# Patient Record
Sex: FEMALE | Race: WHITE | NOT HISPANIC OR LATINO | Employment: OTHER | URBAN - METROPOLITAN AREA
[De-identification: names, ages, dates, MRNs, and addresses within clinical notes are randomized per-mention and may not be internally consistent; named-entity substitution may affect disease eponyms.]

---

## 2017-11-22 ENCOUNTER — OFFICE VISIT (OUTPATIENT)
Dept: INTERNAL MEDICINE | Facility: CLINIC | Age: 72
End: 2017-11-22

## 2017-11-22 VITALS
WEIGHT: 156 LBS | HEART RATE: 79 BPM | HEIGHT: 67 IN | SYSTOLIC BLOOD PRESSURE: 124 MMHG | BODY MASS INDEX: 24.48 KG/M2 | OXYGEN SATURATION: 98 % | DIASTOLIC BLOOD PRESSURE: 82 MMHG | TEMPERATURE: 98.1 F

## 2017-11-22 DIAGNOSIS — M17.0 PRIMARY OSTEOARTHRITIS OF BOTH KNEES: ICD-10-CM

## 2017-11-22 DIAGNOSIS — I10 ESSENTIAL HYPERTENSION: Primary | ICD-10-CM

## 2017-11-22 DIAGNOSIS — E78.49 OTHER HYPERLIPIDEMIA: ICD-10-CM

## 2017-11-22 DIAGNOSIS — I10 ESSENTIAL HYPERTENSION: ICD-10-CM

## 2017-11-22 PROBLEM — M17.10 PRIMARY OSTEOARTHRITIS OF KNEE: Status: ACTIVE | Noted: 2017-11-22

## 2017-11-22 PROCEDURE — 99204 OFFICE O/P NEW MOD 45 MIN: CPT | Performed by: FAMILY MEDICINE

## 2017-11-22 RX ORDER — BENAZEPRIL HYDROCHLORIDE 40 MG/1
40 TABLET, FILM COATED ORAL DAILY
Qty: 30 TABLET | Refills: 3 | Status: SHIPPED | OUTPATIENT
Start: 2017-11-22 | End: 2017-11-29 | Stop reason: ALTCHOICE

## 2017-11-22 RX ORDER — CHLORAL HYDRATE 500 MG
1000 CAPSULE ORAL
COMMUNITY
End: 2019-06-10

## 2017-11-22 RX ORDER — ASPIRIN 81 MG/1
81 TABLET ORAL DAILY
COMMUNITY

## 2017-11-22 RX ORDER — ROSUVASTATIN CALCIUM 5 MG/1
5 TABLET, COATED ORAL DAILY
Qty: 30 TABLET | Refills: 6 | Status: SHIPPED | OUTPATIENT
Start: 2017-11-22 | End: 2018-06-09 | Stop reason: SDUPTHER

## 2017-11-22 RX ORDER — SODIUM PHOSPHATE,MONO-DIBASIC 19G-7G/118
1 ENEMA (ML) RECTAL DAILY
COMMUNITY
End: 2020-01-03

## 2017-11-22 NOTE — PROGRESS NOTES
Subjective   Marcia Ulloa is a 72 y.o. female.     Chief Complaint   Patient presents with   • New Patient Visit     previous PCP: LUIS DANIEL Jefferson   • Hypertension   • Hyperlipidemia         History of Present Illness     Patient resents today for new patient.  Patient has a past medical history of hypertension.  She notes that hypertension is stable.  Her blood pressure today's office visit 124/82.  She currently takes benazepril 1 mg tablets daily.  She notes this helps keeps her blood pressure while balance.  She denies any side effects of headaches or blurry vision.    Has a history of osteoarthritis.  She notes her knees causing her significant pain.  She notes that she recently saw orthopedics, and was given a cortisone shot as well as a gel injection to help her with the pain.  She is also taking glucosamine daily for this arthritis.  She notes that she has noticed symptoms are still present, however it has really improved since having the injections in the knee.    Patient also has a past medical history of hyperlipidemia.  She notes she has been stable on Crestor 5 mg daily.  She has not had a recent cholesterol check.        The following portions of the patient's history were reviewed and updated as appropriate: allergies, current medications, past family history, past medical history, past social history, past surgical history and problem list.    Review of Systems   Constitutional: Negative for chills and fever.   HENT: Negative for congestion, rhinorrhea, sinus pain and sore throat.    Eyes: Negative for photophobia and visual disturbance.   Respiratory: Negative for cough, chest tightness and shortness of breath.    Cardiovascular: Negative for chest pain and palpitations.   Gastrointestinal: Negative for diarrhea, nausea and vomiting.   Genitourinary: Negative for dysuria, frequency and urgency.   Skin: Negative for rash and wound.   Neurological: Negative for dizziness and syncope.    Psychiatric/Behavioral: Negative for behavioral problems and confusion.       Objective   Physical Exam   Constitutional: She is oriented to person, place, and time. She appears well-developed and well-nourished.   HENT:   Head: Normocephalic and atraumatic.   Right Ear: External ear normal.   Left Ear: External ear normal.   Mouth/Throat: Oropharynx is clear and moist.   Eyes: EOM are normal.   Neck: Normal range of motion. Neck supple.   Cardiovascular: Normal rate, regular rhythm and normal heart sounds.    Pulmonary/Chest: Effort normal and breath sounds normal. No respiratory distress.   Musculoskeletal: Normal range of motion.   Lymphadenopathy:     She has no cervical adenopathy.   Neurological: She is alert and oriented to person, place, and time.   Skin: Skin is warm.   Psychiatric: She has a normal mood and affect. Her behavior is normal.   Nursing note and vitals reviewed.      Assessment/Plan   Marcia was seen today for new patient visit, hypertension and hyperlipidemia.    Diagnoses and all orders for this visit:    Essential hypertension  -     benazepril (LOTENSIN) 40 MG tablet; Take 1 tablet by mouth Daily.  -     Comprehensive Metabolic Panel; Future    Other hyperlipidemia  -     rosuvastatin (CRESTOR) 5 MG tablet; Take 1 tablet by mouth Daily.  -     Lipid Panel With LDL / HDL Ratio; Future    Primary osteoarthritis of both knees        -    Patient's pain is under control at this time.  Patient may take Tylenol if pain gets aggravated.  In the meantime patient should follow-up with orthopedics in 6 months.          Return in about 1 year (around 11/22/2018) for Medicare Wellness.    Much of this encounter note is an electronic transcription/translation of spoken language to printed text. The electronic translation of spoken language may permit erroneous, or at times, nonsensical words or phrases to be inadvertently transcribed; although I have reviewed the note for such errors, some may still  exist.

## 2017-11-28 LAB
ALBUMIN SERPL-MCNC: 4.4 G/DL (ref 3.5–5.2)
ALBUMIN/GLOB SERPL: 1.5 G/DL
ALP SERPL-CCNC: 85 U/L (ref 39–117)
ALT SERPL-CCNC: 15 U/L (ref 1–33)
AST SERPL-CCNC: 17 U/L (ref 1–32)
BILIRUB SERPL-MCNC: 0.6 MG/DL (ref 0.1–1.2)
BUN SERPL-MCNC: 21 MG/DL (ref 8–23)
BUN/CREAT SERPL: 16.4 (ref 7–25)
CALCIUM SERPL-MCNC: 9.9 MG/DL (ref 8.6–10.5)
CHLORIDE SERPL-SCNC: 101 MMOL/L (ref 98–107)
CHOLEST SERPL-MCNC: 146 MG/DL (ref 0–200)
CO2 SERPL-SCNC: 25.4 MMOL/L (ref 22–29)
CREAT SERPL-MCNC: 1.28 MG/DL (ref 0.57–1)
GFR SERPLBLD CREATININE-BSD FMLA CKD-EPI: 41 ML/MIN/1.73
GFR SERPLBLD CREATININE-BSD FMLA CKD-EPI: 50 ML/MIN/1.73
GLOBULIN SER CALC-MCNC: 2.9 GM/DL
GLUCOSE SERPL-MCNC: 105 MG/DL (ref 65–99)
HDLC SERPL-MCNC: 54 MG/DL (ref 40–60)
LDLC SERPL CALC-MCNC: 66 MG/DL (ref 0–100)
LDLC/HDLC SERPL: 1.23 {RATIO}
POTASSIUM SERPL-SCNC: 4.7 MMOL/L (ref 3.5–5.2)
PROT SERPL-MCNC: 7.3 G/DL (ref 6–8.5)
SODIUM SERPL-SCNC: 141 MMOL/L (ref 136–145)
TRIGL SERPL-MCNC: 129 MG/DL (ref 0–150)
VLDLC SERPL CALC-MCNC: 25.8 MG/DL (ref 5–40)

## 2017-11-28 NOTE — PROGRESS NOTES
Please inform the patient of the following abnormal results.  Cholesterol is normal.  Stop Benzapril.  Start HCTZ 12.5mg.

## 2017-11-29 ENCOUNTER — TELEPHONE (OUTPATIENT)
Dept: INTERNAL MEDICINE | Facility: CLINIC | Age: 72
End: 2017-11-29

## 2017-11-29 RX ORDER — HYDROCHLOROTHIAZIDE 12.5 MG/1
12.5 CAPSULE, GELATIN COATED ORAL DAILY
Qty: 90 CAPSULE | Refills: 1 | Status: SHIPPED | OUTPATIENT
Start: 2017-11-29 | End: 2018-01-02 | Stop reason: SDUPTHER

## 2017-11-29 RX ORDER — HYDROCHLOROTHIAZIDE 12.5 MG/1
12.5 CAPSULE, GELATIN COATED ORAL DAILY
Qty: 90 CAPSULE | Refills: 1 | Status: SHIPPED | OUTPATIENT
Start: 2017-11-29 | End: 2017-11-29 | Stop reason: SDUPTHER

## 2017-11-29 NOTE — TELEPHONE ENCOUNTER
LVM- patient notified. Patient advised to contact office if they have any questions. Prescription sent to pharmacy per Dr. Mendez.

## 2017-11-29 NOTE — TELEPHONE ENCOUNTER
----- Message from Bk Mendez MD sent at 11/28/2017  9:19 AM EST -----  Please inform the patient of the following abnormal results.  Cholesterol is normal.  Stop Benzapril.  Start HCTZ 12.5mg.

## 2017-11-30 ENCOUNTER — OFFICE VISIT (OUTPATIENT)
Dept: INTERNAL MEDICINE | Facility: CLINIC | Age: 72
End: 2017-11-30

## 2017-11-30 ENCOUNTER — APPOINTMENT (OUTPATIENT)
Dept: LAB | Facility: HOSPITAL | Age: 72
End: 2017-11-30

## 2017-11-30 VITALS
BODY MASS INDEX: 23.98 KG/M2 | OXYGEN SATURATION: 97 % | WEIGHT: 152.8 LBS | DIASTOLIC BLOOD PRESSURE: 78 MMHG | HEIGHT: 67 IN | SYSTOLIC BLOOD PRESSURE: 122 MMHG | HEART RATE: 67 BPM | TEMPERATURE: 98.3 F

## 2017-11-30 DIAGNOSIS — R79.89 ELEVATED SERUM CREATININE: ICD-10-CM

## 2017-11-30 DIAGNOSIS — I10 ESSENTIAL HYPERTENSION: ICD-10-CM

## 2017-11-30 DIAGNOSIS — Z00.00 MEDICARE ANNUAL WELLNESS VISIT, INITIAL: Primary | ICD-10-CM

## 2017-11-30 DIAGNOSIS — M17.0 PRIMARY OSTEOARTHRITIS OF BOTH KNEES: ICD-10-CM

## 2017-11-30 DIAGNOSIS — Z11.59 ENCOUNTER FOR HEPATITIS C SCREENING TEST FOR LOW RISK PATIENT: ICD-10-CM

## 2017-11-30 LAB — HCV AB SER DONR QL: NORMAL

## 2017-11-30 PROCEDURE — G0438 PPPS, INITIAL VISIT: HCPCS | Performed by: FAMILY MEDICINE

## 2017-11-30 PROCEDURE — 86803 HEPATITIS C AB TEST: CPT | Performed by: FAMILY MEDICINE

## 2017-11-30 PROCEDURE — 36415 COLL VENOUS BLD VENIPUNCTURE: CPT | Performed by: FAMILY MEDICINE

## 2017-11-30 PROCEDURE — 99214 OFFICE O/P EST MOD 30 MIN: CPT | Performed by: FAMILY MEDICINE

## 2017-11-30 NOTE — PROGRESS NOTES
QUICK REFERENCE INFORMATION:  The ABCs of the Annual Wellness Visit    Initial Medicare Wellness Visit    HEALTH RISK ASSESSMENT    1945    Recent Hospitalizations:  No hospitalization(s) within the last year..        Current Medical Providers:  Patient Care Team:  Bk Mendez MD as PCP - General (Emergency Medicine)  Senia Frederick MD as Consulting Physician (Obstetrics and Gynecology)  Thaddeus Stafford MD as Consulting Physician (Orthopedic Surgery)  Elayne Paulino MD as Consulting Physician (Gynecology)  Hunter Benito DC (Chiropractic Medicine)  Shahrzad Maza MD as Consulting Physician (Ophthalmology)  Kostas Loomis MD (Dermatology)        Smoking Status:  History   Smoking Status   • Never Smoker   Smokeless Tobacco   • Never Used       Alcohol Consumption:  History   Alcohol Use   • Yes     Comment: 1/day       Depression Screen:   PHQ-2/PHQ-9 Depression Screening 11/30/2017   Little interest or pleasure in doing things 0   Feeling down, depressed, or hopeless 0   Trouble falling or staying asleep, or sleeping too much 3   Feeling tired or having little energy 2   Poor appetite or overeating 0   Feeling bad about yourself - or that you are a failure or have let yourself or your family down 1   Trouble concentrating on things, such as reading the newspaper or watching television 0   Moving or speaking so slowly that other people could have noticed. Or the opposite - being so fidgety or restless that you have been moving around a lot more than usual 0   Thoughts that you would be better off dead, or of hurting yourself in some way 0   Total Score 6   If you checked off any problems, how difficult have these problems made it for you to do your work, take care of things at home, or get along with other people? Not difficult at all       Health Habits and Functional and Cognitive Screening:  Functional & Cognitive Status 11/30/2017   Do you have difficulty preparing food and eating? No   Do  you have difficulty bathing yourself, getting dressed or grooming yourself? No   Do you have difficulty using the toilet? No   Do you have difficulty moving around from place to place? Yes   Do you have trouble with steps or getting out of a bed or a chair? Yes   In the past year have you fallen or experienced a near fall? No   Current Diet Well Balanced Diet   Dental Exam Up to date   Eye Exam Up to date   Exercise (times per week) 7 times per week   Current Exercise Activities Include Housecleaning   Do you need help using the phone?  No   Are you deaf or do you have serious difficulty hearing?  Yes   Do you need help with transportation? No   Do you need help shopping? No   Do you need help preparing meals?  No   Do you need help with housework?  No   Do you need help with laundry? No   Do you need help taking your medications? No   Do you need help managing money? No   Have you felt unusual stress, anger or loneliness in the last month? No   Who do you live with? Spouse   If you need help, do you have trouble finding someone available to you? No   Have you been bothered in the last four weeks by sexual problems? No   Do you have difficulty concentrating, remembering or making decisions? No           Does the patient have evidence of cognitive impairment? No    Asiprin use counseling: Taking ASA appropriately as indicated      Recent Lab Results:    Visual Acuity:  No exam data present    Age-appropriate Screening Schedule:  Refer to the list below for future screening recommendations based on patient's age, sex and/or medical conditions. Orders for these recommended tests are listed in the plan section. The patient has been provided with a written plan.    Health Maintenance   Topic Date Due   • TDAP/TD VACCINES (1 - Tdap) [birth year]/09/1965   • ZOSTER VACCINE  09/04/2017   • PNEUMOCOCCAL VACCINES (65+ LOW/MEDIUM RISK) (2 of 2 - PPSV23) 05/01/2018   • LIPID PANEL  11/27/2018   • MAMMOGRAM  12/01/2018   • COLONOSCOPY   "01/01/2024   • INFLUENZA VACCINE  Completed        Subjective   History of Present Illness    Marcia Ulloa is a 72 y.o. female who presents for an Annual Wellness Visit.    The following portions of the patient's history were reviewed and updated as appropriate: allergies, current medications, past family history, past medical history, past social history, past surgical history and problem list.    Outpatient Medications Prior to Visit   Medication Sig Dispense Refill   • aspirin 81 MG EC tablet Take 81 mg by mouth Daily.     • Calcium Carbonate-Vitamin D (CALCIUM-CARB 600 + D PO) Take 1 tablet by mouth Daily.     • CRANBERRY PO Take 1 tablet by mouth Daily.     • glucosamine-chondroitin 500-400 MG capsule capsule Take 1 capsule by mouth Daily.     • hydrochlorothiazide (MICROZIDE) 12.5 MG capsule Take 1 capsule by mouth Daily. 90 capsule 1   • Multiple Vitamin (MULTI VITAMIN DAILY PO) Take 1 tablet by mouth Daily.     • Omega-3 Fatty Acids (FISH OIL) 1000 MG capsule capsule Take 1,000 mg by mouth Daily With Breakfast.     • rosuvastatin (CRESTOR) 5 MG tablet Take 1 tablet by mouth Daily. 30 tablet 6     No facility-administered medications prior to visit.        Patient Active Problem List   Diagnosis   • Essential hypertension   • Other hyperlipidemia   • Primary osteoarthritis of knee   • Elevated serum creatinine       Advance Care Planning:  has an advance directive - a copy has been provided and is in file    Identification of Risk Factors:  Risk factors include: none.    Review of Systems    Compared to one year ago, the patient feels her physical health is better.  Compared to one year ago, the patient feels her mental health is the same.    Objective     Physical Exam    Vitals:    11/30/17 1142   BP: 122/78   BP Location: Left arm   Patient Position: Sitting   Cuff Size: Adult   Pulse: 67   Temp: 98.3 °F (36.8 °C)   TempSrc: Oral   SpO2: 97%   Weight: 152 lb 12.8 oz (69.3 kg)   Height: 67\" (170.2 cm) "   PainSc: 0-No pain       Body mass index is 23.93 kg/(m^2).  Discussed the patient's BMI with her. The BMI is in the acceptable range.    Assessment/Plan   Patient Self-Management and Personalized Health Advice  The patient has been provided with information about: exercise and preventive services including:   · TdaP vaccine.    Visit Diagnoses:    ICD-10-CM ICD-9-CM   1. Medicare annual wellness visit, initial Z00.00 V70.0   2. Encounter for hepatitis C screening test for low risk patient Z11.59 V73.89   3. Essential hypertension I10 401.9   4. Elevated serum creatinine R79.89 790.99       Orders Placed This Encounter   Procedures   • Hepatitis C Antibody   • Comprehensive Metabolic Panel     Standing Status:   Future       Outpatient Encounter Prescriptions as of 11/30/2017   Medication Sig Dispense Refill   • aspirin 81 MG EC tablet Take 81 mg by mouth Daily.     • Calcium Carbonate-Vitamin D (CALCIUM-CARB 600 + D PO) Take 1 tablet by mouth Daily.     • CRANBERRY PO Take 1 tablet by mouth Daily.     • glucosamine-chondroitin 500-400 MG capsule capsule Take 1 capsule by mouth Daily.     • hydrochlorothiazide (MICROZIDE) 12.5 MG capsule Take 1 capsule by mouth Daily. 90 capsule 1   • Multiple Vitamin (MULTI VITAMIN DAILY PO) Take 1 tablet by mouth Daily.     • Omega-3 Fatty Acids (FISH OIL) 1000 MG capsule capsule Take 1,000 mg by mouth Daily With Breakfast.     • rosuvastatin (CRESTOR) 5 MG tablet Take 1 tablet by mouth Daily. 30 tablet 6     No facility-administered encounter medications on file as of 11/30/2017.        Reviewed use of high risk medication in the elderly: yes  Reviewed for potential of harmful drug interactions in the elderly: yes    Follow Up:  No Follow-up on file.     An After Visit Summary and PPPS with all of these plans were given to the patient.

## 2017-11-30 NOTE — PROGRESS NOTES
Subjective   Marcia Ulloa is a 72 y.o. female.     Chief Complaint   Patient presents with   • Inital Medicare Wellness Visit         History of Present Illness     Patient has a past medical history of hypertension.  On last office visit a CMP was conducted, and serum creatinine was elevated.  I discussed the patient about the findings.  Patient stated that in the past she's also had an elevated serum creatinine and nothing was mentioned to her.  She is currently taken an ACE inhibitor for her blood pressure.  I discussed switching his ACE inhibitor to a mild diuretic such as hydrochlorothiazide.  I discussed that we will need to monitor patient's serum creatinine as well.  Patient also states that she takes nightly ibuprofen to help her with her knee pain.  I discussed patient to take Tylenol instead of ibuprofen for her osteoarthritis of her knees.  Patient notes that she has not tried Tylenol.  She recently got a urine injection and a Synvisc gel ejection into the knees.  She notes that this is helped significantly.    Patient's blood pressure has been stable.  Today's visit her blood pressures 122/78.  She denies any side effects of the medication.  She denies any headaches or blurry vision.    The following portions of the patient's history were reviewed and updated as appropriate: allergies, current medications, past family history, past medical history, past social history, past surgical history and problem list.    Review of Systems   Constitutional: Negative for chills and fever.   HENT: Negative for congestion, rhinorrhea, sinus pain and sore throat.    Eyes: Negative for photophobia and visual disturbance.   Respiratory: Negative for cough, chest tightness and shortness of breath.    Cardiovascular: Negative for chest pain and palpitations.   Gastrointestinal: Negative for diarrhea, nausea and vomiting.   Genitourinary: Negative for dysuria, frequency and urgency.   Skin: Negative for rash and wound.    Neurological: Negative for dizziness and syncope.   Psychiatric/Behavioral: Negative for behavioral problems and confusion.       Objective   Physical Exam   Constitutional: She is oriented to person, place, and time. She appears well-developed and well-nourished.   HENT:   Head: Normocephalic and atraumatic.   Right Ear: External ear normal.   Left Ear: External ear normal.   Mouth/Throat: Oropharynx is clear and moist.   Eyes: EOM are normal.   Neck: Normal range of motion. Neck supple.   Cardiovascular: Normal rate, regular rhythm and normal heart sounds.    Pulmonary/Chest: Effort normal and breath sounds normal. No respiratory distress.   Abdominal: Soft. There is no tenderness. There is no guarding.   Musculoskeletal: Normal range of motion.   Lymphadenopathy:     She has no cervical adenopathy.   Neurological: She is alert and oriented to person, place, and time.   Skin: Skin is warm.   Psychiatric: She has a normal mood and affect. Her behavior is normal.   Nursing note and vitals reviewed.      Assessment/Plan   Marcia was seen today for inital medicare wellness visit.    Diagnoses and all orders for this visit:    Medicare annual wellness visit, initial    Osteoarthritis        -     I discussed with patient that she should stop taking ibuprofen.  She should use Tylenol for pain.  We'll reevaluate in couple weeks.    Essential hypertension  -     Comprehensive Metabolic Panel; Future  -     Discussed with patient to discontinue the ACE inhibitor.  We'll start patient on hydrochlorothiazide 12.5 mg.  Discussed with patient that we will recheck in 3 weeks on how she is doing with the new medication.    Elevated serum creatinine  -     Comprehensive Metabolic Panel; Future  -     Serum creatinine was elevated on the last CMP done.  I discussed the patient that we will check this in couple weeks and reevaluate.  I discussed the patient with stopping the ACE inhibitor as well as NSAIDs with patient as taken  at nighttime, her creatinine should come back down to normal.  If it continues to stay abnormal, patient will will need to see nephrology.          No Follow-up on file.    Much of this encounter note is an electronic transcription/translation of spoken language to printed text. The electronic translation of spoken language may permit erroneous, or at times, nonsensical words or phrases to be inadvertently transcribed; although I have reviewed the note for such errors, some may still exist.

## 2017-12-04 ENCOUNTER — TELEPHONE (OUTPATIENT)
Dept: INTERNAL MEDICINE | Facility: CLINIC | Age: 72
End: 2017-12-04

## 2017-12-04 NOTE — TELEPHONE ENCOUNTER
----- Message from Bk Mendez MD sent at 12/1/2017  3:44 PM EST -----  The labs were reviewed. Please inform patient that labs were normal.

## 2017-12-19 DIAGNOSIS — R79.89 ELEVATED SERUM CREATININE: Primary | ICD-10-CM

## 2017-12-20 LAB
ALBUMIN SERPL-MCNC: 4.2 G/DL (ref 3.5–5.2)
ALBUMIN/GLOB SERPL: 1.4 G/DL
ALP SERPL-CCNC: 89 U/L (ref 39–117)
ALT SERPL-CCNC: 15 U/L (ref 1–33)
AST SERPL-CCNC: 19 U/L (ref 1–32)
BILIRUB SERPL-MCNC: 0.3 MG/DL (ref 0.1–1.2)
BUN SERPL-MCNC: 23 MG/DL (ref 8–23)
BUN/CREAT SERPL: 17.6 (ref 7–25)
CALCIUM SERPL-MCNC: 9.5 MG/DL (ref 8.6–10.5)
CHLORIDE SERPL-SCNC: 101 MMOL/L (ref 98–107)
CO2 SERPL-SCNC: 28.7 MMOL/L (ref 22–29)
CREAT SERPL-MCNC: 1.31 MG/DL (ref 0.57–1)
GLOBULIN SER CALC-MCNC: 3 GM/DL
GLUCOSE SERPL-MCNC: 106 MG/DL (ref 65–99)
POTASSIUM SERPL-SCNC: 3.8 MMOL/L (ref 3.5–5.2)
PROT SERPL-MCNC: 7.2 G/DL (ref 6–8.5)
SODIUM SERPL-SCNC: 143 MMOL/L (ref 136–145)

## 2017-12-21 ENCOUNTER — TELEPHONE (OUTPATIENT)
Dept: INTERNAL MEDICINE | Facility: CLINIC | Age: 72
End: 2017-12-21

## 2017-12-21 DIAGNOSIS — R74.8 ELEVATED CREATINE KINASE: Primary | ICD-10-CM

## 2017-12-21 NOTE — TELEPHONE ENCOUNTER
----- Message from Bk Mendez MD sent at 12/21/2017  9:16 AM EST -----  Please inform the patient of the following abnormal results.  Serum creatinine continues to stay elevated.  Patient will need to see nephrology for further evaluation.

## 2017-12-21 NOTE — PROGRESS NOTES
Please inform the patient of the following abnormal results.  Serum creatinine continues to stay elevated.  Patient will need to see nephrology for further evaluation.

## 2017-12-21 NOTE — TELEPHONE ENCOUNTER
Patient's  notified (okay per HIPAA) and voiced understanding. Patient's  advised to have patient contact office if they have any questions. Referral put into [facility] per Dr. Mendez.

## 2017-12-22 ENCOUNTER — OFFICE VISIT (OUTPATIENT)
Dept: INTERNAL MEDICINE | Facility: CLINIC | Age: 72
End: 2017-12-22

## 2017-12-22 VITALS
OXYGEN SATURATION: 100 % | RESPIRATION RATE: 16 BRPM | HEIGHT: 67 IN | TEMPERATURE: 98.1 F | WEIGHT: 152.7 LBS | SYSTOLIC BLOOD PRESSURE: 134 MMHG | HEART RATE: 76 BPM | DIASTOLIC BLOOD PRESSURE: 76 MMHG | BODY MASS INDEX: 23.97 KG/M2

## 2017-12-22 DIAGNOSIS — I10 ESSENTIAL HYPERTENSION: ICD-10-CM

## 2017-12-22 DIAGNOSIS — R79.89 ELEVATED SERUM CREATININE: Primary | ICD-10-CM

## 2017-12-22 PROCEDURE — 99214 OFFICE O/P EST MOD 30 MIN: CPT | Performed by: FAMILY MEDICINE

## 2017-12-22 NOTE — PROGRESS NOTES
Subjective   Marcia Ulloa is a 72 y.o. female.     Chief Complaint   Patient presents with   • Labs Only     recheck kidney fx         History of Present Illness     It was noted that patient's renal function had declined the last office visit.  Today's office visit patient's most recent serum creatinine done couple days ago shows is 1.31.  Patient states that in the past she's had episodes of elevated serum creatinine.  She is only monitoring and did not see any specialist regarding this.    Last office visit she was told to stop lisinopril started on hydrochlorothiazide.  Perhaps Ace inhibitors could be contributing to her elevated serum creatinine.  Her blood pressure today's office visit has been normal at 134/76.  She is currently taken hydrochlorothiazide 12.5 mg daily.  She currently denies any side effects of the hydrochlorothiazide.    The following portions of the patient's history were reviewed and updated as appropriate: allergies, current medications, past family history, past medical history, past social history, past surgical history and problem list.    Review of Systems   Constitutional: Negative for chills and fever.   HENT: Negative for congestion, rhinorrhea, sinus pain and sore throat.    Eyes: Negative for photophobia and visual disturbance.   Respiratory: Negative for cough, chest tightness and shortness of breath.    Cardiovascular: Negative for chest pain and palpitations.   Gastrointestinal: Negative for diarrhea, nausea and vomiting.   Genitourinary: Negative for dysuria, frequency and urgency.   Skin: Negative for rash and wound.   Neurological: Negative for dizziness and syncope.   Psychiatric/Behavioral: Negative for behavioral problems and confusion.       Objective   Physical Exam   Constitutional: She is oriented to person, place, and time. She appears well-developed and well-nourished.   HENT:   Head: Normocephalic and atraumatic.   Right Ear: External ear normal.   Left Ear:  External ear normal.   Mouth/Throat: Oropharynx is clear and moist.   Eyes: EOM are normal.   Neck: Normal range of motion. Neck supple.   Cardiovascular: Normal rate, regular rhythm and normal heart sounds.    Pulmonary/Chest: Effort normal and breath sounds normal. No respiratory distress.   Musculoskeletal: Normal range of motion.   Lymphadenopathy:     She has no cervical adenopathy.   Neurological: She is alert and oriented to person, place, and time.   Skin: Skin is warm.   Psychiatric: She has a normal mood and affect. Her behavior is normal.   Nursing note and vitals reviewed.      Assessment/Plan   Marcia was seen today for labs only.    Diagnoses and all orders for this visit:    Elevated serum creatinine  - Labs were reviewed at today's office visit.  We'll continue to monitor.  Patient perhaps we'll need to see nephrology.  Patient may need to see nephrology down in [place], and she believes they after Christmas and does not come back to end of April.    Essential hypertension  - Continue hydrochlorothiazide 12.5 mg.  Patient seems to be doing well on this dose.          No Follow-up on file.    Much of this encounter note is an electronic transcription/translation of spoken language to printed text. The electronic translation of spoken language may permit erroneous, or at times, nonsensical words or phrases to be inadvertently transcribed; although I have reviewed the note for such errors, some may still exist.

## 2018-01-02 ENCOUNTER — TELEPHONE (OUTPATIENT)
Dept: INTERNAL MEDICINE | Facility: CLINIC | Age: 73
End: 2018-01-02

## 2018-01-02 RX ORDER — HYDROCHLOROTHIAZIDE 12.5 MG/1
25 CAPSULE, GELATIN COATED ORAL DAILY
Qty: 90 CAPSULE | Refills: 1
Start: 2018-01-02 | End: 2018-01-30 | Stop reason: DRUGHIGH

## 2018-01-02 NOTE — TELEPHONE ENCOUNTER
Patient called stating that she doesn't think that her new blood pressure medication is working. She stated she was changed from benazepril 40 mg once daily to HCTZ 12.5 mg once daily. Patient stated that her BP has been running from 153-148/84-80. Per Dr. Mendez, increase HCTZ from 12.5 mg to 25 mg daily. Patient should monitor BP twice daily and call back at the end of the week with results. Patient notified. Patient stated she had enough HCTZ 12.5 mg tablets to take 2 daily to equal 25 mg daily.

## 2018-01-30 ENCOUNTER — TELEPHONE (OUTPATIENT)
Dept: INTERNAL MEDICINE | Facility: CLINIC | Age: 73
End: 2018-01-30

## 2018-01-30 RX ORDER — HYDROCHLOROTHIAZIDE 25 MG/1
25 TABLET ORAL DAILY
Qty: 90 TABLET | Refills: 1 | Status: SHIPPED | OUTPATIENT
Start: 2018-01-30 | End: 2018-07-23 | Stop reason: SDUPTHER

## 2018-01-30 NOTE — TELEPHONE ENCOUNTER
Patient called to report her blood pressure readings on her new dose of HCTZ 25 mg daily. They are as follows:    129/82  137/84  136/79  132/78  126/77    Per Dr. Mendez send in prescription for HCTZ 25 mg daily.

## 2018-05-07 ENCOUNTER — OFFICE VISIT (OUTPATIENT)
Dept: INTERNAL MEDICINE | Facility: CLINIC | Age: 73
End: 2018-05-07

## 2018-05-07 VITALS
OXYGEN SATURATION: 96 % | HEART RATE: 84 BPM | WEIGHT: 151.7 LBS | SYSTOLIC BLOOD PRESSURE: 118 MMHG | DIASTOLIC BLOOD PRESSURE: 84 MMHG | HEIGHT: 67 IN | BODY MASS INDEX: 23.81 KG/M2

## 2018-05-07 DIAGNOSIS — I10 ESSENTIAL HYPERTENSION: Primary | ICD-10-CM

## 2018-05-07 DIAGNOSIS — E78.49 OTHER HYPERLIPIDEMIA: ICD-10-CM

## 2018-05-07 DIAGNOSIS — R79.89 ELEVATED SERUM CREATININE: ICD-10-CM

## 2018-05-07 PROCEDURE — 99214 OFFICE O/P EST MOD 30 MIN: CPT | Performed by: FAMILY MEDICINE

## 2018-05-07 NOTE — PROGRESS NOTES
Subjective   Marcia Ulloa is a 73 y.o. female.     Chief Complaint   Patient presents with   • Hypertension   • Elevated Creatinine   • Hyperlipidemia         History of Present Illness     Patient presents today with a past medical history of essential hypertension.  Patient's currently taken hydrochlorothiazide 25 mg daily for her blood pressure.  Her blood pressure today's office visit is 118/84.  She denies any headaches or blurry vision.  Patient states that she is tolerating the medication well.    Past medical history of hyperlipidemia.  Patient's currently taken Crestor 5 mg daily.  Denies any side effects of medication.    Patient has a history last and most recent CMP is to have an elevated serum creatinine.  Patient states that she plans on seeing a nephrologist.  Patient had been out of town for last 4-1/2 months as she goes to [place] during the winter months.    The following portions of the patient's history were reviewed and updated as appropriate: allergies, current medications, past family history, past medical history, past social history, past surgical history and problem list.    Review of Systems   Constitutional: Negative for chills and fever.   HENT: Negative for congestion, rhinorrhea, sinus pain and sore throat.    Eyes: Negative for photophobia and visual disturbance.   Respiratory: Negative for cough, chest tightness and shortness of breath.    Cardiovascular: Negative for chest pain and palpitations.   Gastrointestinal: Negative for diarrhea, nausea and vomiting.   Genitourinary: Negative for dysuria, frequency and urgency.   Skin: Negative for rash and wound.   Neurological: Negative for dizziness and syncope.   Psychiatric/Behavioral: Negative for behavioral problems and confusion.       Objective   Physical Exam   Constitutional: She is oriented to person, place, and time. She appears well-developed and well-nourished.   HENT:   Head: Normocephalic and atraumatic.   Right Ear:  External ear normal.   Left Ear: External ear normal.   Mouth/Throat: Oropharynx is clear and moist.   Eyes: EOM are normal.   Neck: Normal range of motion. Neck supple.   Cardiovascular: Normal rate, regular rhythm and normal heart sounds.    Pulmonary/Chest: Effort normal and breath sounds normal. No respiratory distress.   Musculoskeletal: Normal range of motion.   Lymphadenopathy:     She has no cervical adenopathy.   Neurological: She is alert and oriented to person, place, and time.   Skin: Skin is warm.   Psychiatric: She has a normal mood and affect. Her behavior is normal.   Nursing note and vitals reviewed.      Assessment/Plan   Marcia was seen today for hypertension, elevated creatinine and hyperlipidemia.    Diagnoses and all orders for this visit:    Essential hypertension  -     Comprehensive Metabolic Panel  -     She should continue the use of the chlorothiazide 25 mg daily.    Elevated serum creatinine  -     Comprehensive Metabolic Panel will be rechecked at today's office visit.    Other hyperlipidemia  -     Comprehensive Metabolic Panel will be checked to rule out any elevation of liver enzymes.  -     The patient should continue the use of Crestor 5 mg daily.          No Follow-up on file.    Dictated utilizing Dragon Voice Recognition Software

## 2018-05-08 ENCOUNTER — TELEPHONE (OUTPATIENT)
Dept: INTERNAL MEDICINE | Facility: CLINIC | Age: 73
End: 2018-05-08

## 2018-05-08 LAB
ALBUMIN SERPL-MCNC: 4.7 G/DL (ref 3.5–5.2)
ALBUMIN/GLOB SERPL: 1.8 G/DL
ALP SERPL-CCNC: 80 U/L (ref 39–117)
ALT SERPL-CCNC: 18 U/L (ref 1–33)
AST SERPL-CCNC: 21 U/L (ref 1–32)
BILIRUB SERPL-MCNC: 0.6 MG/DL (ref 0.1–1.2)
BUN SERPL-MCNC: 25 MG/DL (ref 8–23)
BUN/CREAT SERPL: 22.1 (ref 7–25)
CALCIUM SERPL-MCNC: 10.3 MG/DL (ref 8.6–10.5)
CHLORIDE SERPL-SCNC: 98 MMOL/L (ref 98–107)
CO2 SERPL-SCNC: 27.4 MMOL/L (ref 22–29)
CREAT SERPL-MCNC: 1.13 MG/DL (ref 0.57–1)
GFR SERPLBLD CREATININE-BSD FMLA CKD-EPI: 47 ML/MIN/1.73
GFR SERPLBLD CREATININE-BSD FMLA CKD-EPI: 57 ML/MIN/1.73
GLOBULIN SER CALC-MCNC: 2.6 GM/DL
GLUCOSE SERPL-MCNC: 128 MG/DL (ref 65–99)
POTASSIUM SERPL-SCNC: 3.6 MMOL/L (ref 3.5–5.2)
PROT SERPL-MCNC: 7.3 G/DL (ref 6–8.5)
SODIUM SERPL-SCNC: 141 MMOL/L (ref 136–145)

## 2018-05-08 NOTE — TELEPHONE ENCOUNTER
----- Message from Bk Mendez MD sent at 5/8/2018  9:50 AM EDT -----  Please inform the patient of the following abnormal results.  Serum creatnine still elevated but improved from four months ago. Keep nephology appointment.

## 2018-05-08 NOTE — PROGRESS NOTES
Please inform the patient of the following abnormal results.  Serum creatnine still elevated but improved from four months ago. Keep nephology appointment.

## 2018-05-24 ENCOUNTER — TELEPHONE (OUTPATIENT)
Dept: INTERNAL MEDICINE | Facility: CLINIC | Age: 73
End: 2018-05-24

## 2018-05-24 DIAGNOSIS — Z12.39 SCREENING FOR BREAST CANCER: Primary | ICD-10-CM

## 2018-05-24 NOTE — TELEPHONE ENCOUNTER
Patient called requesting an order for a mammogram. Per Dr. Andrea hi to order screening mammogram Dx: screening for breast cancer. Order put into EPIC. Patient notified.

## 2018-05-25 ENCOUNTER — TRANSCRIBE ORDERS (OUTPATIENT)
Dept: ADMINISTRATIVE | Facility: HOSPITAL | Age: 73
End: 2018-05-25

## 2018-05-25 DIAGNOSIS — Z12.31 VISIT FOR SCREENING MAMMOGRAM: Primary | ICD-10-CM

## 2018-05-31 ENCOUNTER — HOSPITAL ENCOUNTER (OUTPATIENT)
Dept: MAMMOGRAPHY | Facility: HOSPITAL | Age: 73
Discharge: HOME | End: 2018-05-31
Admitting: FAMILY MEDICINE

## 2018-05-31 DIAGNOSIS — Z12.31 VISIT FOR SCREENING MAMMOGRAM: ICD-10-CM

## 2018-05-31 PROCEDURE — 77063 BREAST TOMOSYNTHESIS BI: CPT

## 2018-05-31 PROCEDURE — 77067 SCR MAMMO BI INCL CAD: CPT

## 2018-06-01 ENCOUNTER — TRANSCRIBE ORDERS (OUTPATIENT)
Dept: ADMINISTRATIVE | Facility: HOSPITAL | Age: 73
End: 2018-06-01

## 2018-06-01 DIAGNOSIS — N18.30 CKD (CHRONIC KIDNEY DISEASE), STAGE III (HCC): Primary | ICD-10-CM

## 2018-06-05 ENCOUNTER — APPOINTMENT (OUTPATIENT)
Dept: ULTRASOUND IMAGING | Facility: HOSPITAL | Age: 73
End: 2018-06-05
Attending: INTERNAL MEDICINE

## 2018-06-08 ENCOUNTER — HOSPITAL ENCOUNTER (OUTPATIENT)
Dept: ULTRASOUND IMAGING | Facility: HOSPITAL | Age: 73
Discharge: HOME | End: 2018-06-08
Attending: INTERNAL MEDICINE | Admitting: INTERNAL MEDICINE

## 2018-06-08 ENCOUNTER — TELEPHONE (OUTPATIENT)
Dept: INTERNAL MEDICINE | Facility: CLINIC | Age: 73
End: 2018-06-08

## 2018-06-08 DIAGNOSIS — N18.30 CKD (CHRONIC KIDNEY DISEASE), STAGE III (HCC): ICD-10-CM

## 2018-06-08 PROCEDURE — 76775 US EXAM ABDO BACK WALL LIM: CPT

## 2018-06-08 NOTE — TELEPHONE ENCOUNTER
----- Message from Bk Mendez MD sent at 6/7/2018 10:17 AM EDT -----  Normal mammogram.  Repeat as indicated

## 2018-06-09 DIAGNOSIS — E78.49 OTHER HYPERLIPIDEMIA: ICD-10-CM

## 2018-06-11 RX ORDER — ROSUVASTATIN CALCIUM 5 MG/1
5 TABLET, COATED ORAL DAILY
Qty: 90 TABLET | Refills: 1 | Status: SHIPPED | OUTPATIENT
Start: 2018-06-11 | End: 2018-12-02 | Stop reason: SDUPTHER

## 2018-07-23 RX ORDER — HYDROCHLOROTHIAZIDE 25 MG/1
25 TABLET ORAL DAILY
Qty: 90 TABLET | Refills: 1 | Status: SHIPPED | OUTPATIENT
Start: 2018-07-23 | End: 2018-12-10 | Stop reason: SDUPTHER

## 2018-12-02 DIAGNOSIS — E78.49 OTHER HYPERLIPIDEMIA: ICD-10-CM

## 2018-12-03 RX ORDER — ROSUVASTATIN CALCIUM 5 MG/1
5 TABLET, COATED ORAL DAILY
Qty: 90 TABLET | Refills: 0 | Status: SHIPPED | OUTPATIENT
Start: 2018-12-03 | End: 2018-12-10 | Stop reason: SDUPTHER

## 2018-12-10 ENCOUNTER — OFFICE VISIT (OUTPATIENT)
Dept: INTERNAL MEDICINE | Facility: CLINIC | Age: 73
End: 2018-12-10

## 2018-12-10 VITALS
SYSTOLIC BLOOD PRESSURE: 138 MMHG | HEART RATE: 68 BPM | HEIGHT: 67 IN | BODY MASS INDEX: 23.54 KG/M2 | DIASTOLIC BLOOD PRESSURE: 82 MMHG | WEIGHT: 150 LBS | OXYGEN SATURATION: 95 % | RESPIRATION RATE: 16 BRPM

## 2018-12-10 DIAGNOSIS — Z13.1 SCREENING FOR DIABETES MELLITUS: ICD-10-CM

## 2018-12-10 DIAGNOSIS — Z13.29 SCREENING FOR THYROID DISORDER: ICD-10-CM

## 2018-12-10 DIAGNOSIS — R73.9 ELEVATED SERUM GLUCOSE: ICD-10-CM

## 2018-12-10 DIAGNOSIS — Z13.220 SCREENING FOR LIPID DISORDERS: ICD-10-CM

## 2018-12-10 DIAGNOSIS — Z00.00 MEDICARE ANNUAL WELLNESS VISIT, SUBSEQUENT: Primary | ICD-10-CM

## 2018-12-10 DIAGNOSIS — I10 ESSENTIAL HYPERTENSION: ICD-10-CM

## 2018-12-10 DIAGNOSIS — E78.49 OTHER HYPERLIPIDEMIA: ICD-10-CM

## 2018-12-10 DIAGNOSIS — Z00.00 WELL WOMAN EXAM (NO GYNECOLOGICAL EXAM): ICD-10-CM

## 2018-12-10 PROCEDURE — 99213 OFFICE O/P EST LOW 20 MIN: CPT | Performed by: FAMILY MEDICINE

## 2018-12-10 PROCEDURE — G0439 PPPS, SUBSEQ VISIT: HCPCS | Performed by: FAMILY MEDICINE

## 2018-12-10 PROCEDURE — 96160 PT-FOCUSED HLTH RISK ASSMT: CPT | Performed by: FAMILY MEDICINE

## 2018-12-10 PROCEDURE — 99397 PER PM REEVAL EST PAT 65+ YR: CPT | Performed by: FAMILY MEDICINE

## 2018-12-10 RX ORDER — CELECOXIB 200 MG/1
CAPSULE ORAL EVERY 12 HOURS SCHEDULED
COMMUNITY
End: 2018-12-10

## 2018-12-10 RX ORDER — HYDROCHLOROTHIAZIDE 25 MG/1
25 TABLET ORAL DAILY
Qty: 90 TABLET | Refills: 1 | Status: SHIPPED | OUTPATIENT
Start: 2018-12-10 | End: 2019-07-25 | Stop reason: SDUPTHER

## 2018-12-10 RX ORDER — INFLUENZA A VIRUS A/MICHIGAN/45/2015 X-275 (H1N1) ANTIGEN (FORMALDEHYDE INACTIVATED), INFLUENZA A VIRUS A/SINGAPORE/INFIMH-16-0019/2016 IVR-186 (H3N2) ANTIGEN (FORMALDEHYDE INACTIVATED), AND INFLUENZA B VIRUS B/MARYLAND/15/2016 BX-69A (A B/COLORADO/6/2017-LIKE VIRUS) ANTIGEN (FORMALDEHYDE INACTIVATED) 60; 60; 60 UG/.5ML; UG/.5ML; UG/.5ML
INJECTION, SUSPENSION INTRAMUSCULAR
Refills: 0 | COMMUNITY
Start: 2018-10-05 | End: 2019-06-10

## 2018-12-10 RX ORDER — LETROZOLE 2.5 MG/1
2.5 TABLET, FILM COATED ORAL DAILY
COMMUNITY
Start: 2015-06-18 | End: 2018-12-10

## 2018-12-10 RX ORDER — ROSUVASTATIN CALCIUM 5 MG/1
5 TABLET, COATED ORAL DAILY
Qty: 90 TABLET | Refills: 1 | Status: SHIPPED | OUTPATIENT
Start: 2018-12-10 | End: 2019-08-17 | Stop reason: SDUPTHER

## 2018-12-10 NOTE — PROGRESS NOTES
QUICK REFERENCE INFORMATION:  The ABCs of the Annual Wellness Visit    Subsequent Medicare Wellness Visit    HEALTH RISK ASSESSMENT    1945    Recent Hospitalizations:  No hospitalization(s) within the last year..        Current Medical Providers:  Patient Care Team:  Bk Mendez MD as PCP - General (Family Medicine)  Senia Frederick MD as Consulting Physician (Obstetrics and Gynecology)  Thaddeus Stafford MD as Consulting Physician (Orthopedic Surgery)  Elayne Paulino MD as Consulting Physician (Gynecology)  Hunter Benito DC (Chiropractic Medicine)  Shahrzad Maza MD as Consulting Physician (Ophthalmology)  Kostas Loomis MD (Dermatology)        Smoking Status:  Social History     Tobacco Use   Smoking Status Never Smoker   Smokeless Tobacco Never Used       Alcohol Consumption:  Social History     Substance and Sexual Activity   Alcohol Use Yes    Comment: 1/day       Depression Screen:   PHQ-2/PHQ-9 Depression Screening 12/10/2018   Little interest or pleasure in doing things 0   Feeling down, depressed, or hopeless 0   Trouble falling or staying asleep, or sleeping too much -   Feeling tired or having little energy -   Poor appetite or overeating -   Feeling bad about yourself - or that you are a failure or have let yourself or your family down -   Trouble concentrating on things, such as reading the newspaper or watching television -   Moving or speaking so slowly that other people could have noticed. Or the opposite - being so fidgety or restless that you have been moving around a lot more than usual -   Thoughts that you would be better off dead, or of hurting yourself in some way -   Total Score 0   If you checked off any problems, how difficult have these problems made it for you to do your work, take care of things at home, or get along with other people? -       Health Habits and Functional and Cognitive Screening:  Functional & Cognitive Status 12/10/2018   Do you have  difficulty preparing food and eating? No   Do you have difficulty bathing yourself, getting dressed or grooming yourself? No   Do you have difficulty using the toilet? No   Do you have difficulty moving around from place to place? No   Do you have trouble with steps or getting out of a bed or a chair? Yes   In the past year have you fallen or experienced a near fall? No   Current Diet Well Balanced Diet   Dental Exam Up to date   Eye Exam Not up to date   Exercise (times per week) 5 times per week   Current Exercise Activities Include Swimming   Do you need help using the phone?  No   Are you deaf or do you have serious difficulty hearing?  No   Do you need help with transportation? No   Do you need help shopping? No   Do you need help preparing meals?  No   Do you need help with housework?  No   Do you need help with laundry? No   Do you need help taking your medications? No   Do you need help managing money? No   Do you ever drive or ride in a car without wearing a seat belt? No   Have you felt unusual stress, anger or loneliness in the last month? No   Who do you live with? Spouse   If you need help, do you have trouble finding someone available to you? No   Have you been bothered in the last four weeks by sexual problems? No   Do you have difficulty concentrating, remembering or making decisions? -           Does the patient have evidence of cognitive impairment? No    Aspirin use counseling: Taking ASA appropriately as indicated      Recent Lab Results:  CMP:  Lab Results   Component Value Date     (H) 05/07/2018    BUN 25 (H) 05/07/2018    CREATININE 1.13 (H) 05/07/2018    EGFRIFNONA 47 (L) 05/07/2018    EGFRIFAFRI 57 (L) 05/07/2018    BCR 22.1 05/07/2018     05/07/2018    K 3.6 05/07/2018    CO2 27.4 05/07/2018    CALCIUM 10.3 05/07/2018    PROTENTOTREF 7.3 05/07/2018    ALBUMIN 4.70 05/07/2018    LABGLOBREF 2.6 05/07/2018    LABIL2 1.8 05/07/2018    BILITOT 0.6 05/07/2018    ALKPHOS 80  05/07/2018    AST 21 05/07/2018    ALT 18 05/07/2018     Lipid Panel:  Lab Results   Component Value Date    TRIG 129 11/27/2017    HDL 54 11/27/2017    VLDL 25.8 11/27/2017    LDLHDL 1.23 11/27/2017     HbA1c:       Visual Acuity:  No exam data present    Age-appropriate Screening Schedule:  Refer to the list below for future screening recommendations based on patient's age, sex and/or medical conditions. Orders for these recommended tests are listed in the plan section. The patient has been provided with a written plan.    Health Maintenance   Topic Date Due   • TDAP/TD VACCINES (1 - Tdap) 04/09/1965   • ZOSTER VACCINE (1 of 2) 04/09/1996   • PNEUMOCOCCAL VACCINES (65+ LOW/MEDIUM RISK) (2 of 2 - PPSV23) 05/01/2018   • INFLUENZA VACCINE  08/01/2018   • LIPID PANEL  11/27/2018   • MAMMOGRAM  05/31/2020   • COLONOSCOPY  08/12/2024        Subjective   History of Present Illness    Marcia Ulloa is a 73 y.o. female who presents for an Subsequent Wellness Visit.    The following portions of the patient's history were reviewed and updated as appropriate: allergies, current medications, past family history, past medical history, past social history, past surgical history and problem list.    Patient notes have essential hypertension.  Patient's currently Hctz 25 mg daily.  Patient blood pressures 138/82.  Patient denies any side effects of medication.    Patient notes have hyperlipidemia.  Patient's currently taking Crestor 5 mg daily.  Patient denies any side effects of medication.  Patient states the medication seems to work well for her.    Outpatient Medications Prior to Visit   Medication Sig Dispense Refill   • aspirin 81 MG EC tablet Take 81 mg by mouth Daily.     • Calcium Carbonate-Vitamin D (CALCIUM-CARB 600 + D PO) Take 1 tablet by mouth Daily.     • CRANBERRY PO Take 1 tablet by mouth Daily.     • glucosamine-chondroitin 500-400 MG capsule capsule Take 1 capsule by mouth Daily.     • Multiple Vitamin (MULTI  VITAMIN DAILY PO) Take 1 tablet by mouth Daily.     • hydrochlorothiazide (HYDRODIURIL) 25 MG tablet TAKE 1 TABLET BY MOUTH DAILY. 90 tablet 1   • letrozole (FEMARA) 2.5 MG tablet Take 2.5 mg by mouth Daily.     • rosuvastatin (CRESTOR) 5 MG tablet TAKE 1 TABLET BY MOUTH DAILY. 90 tablet 0   • FLUZONE HIGH-DOSE 0.5 ML suspension prefilled syringe injection TO BE ADMINISTERED BY PHARMACIST FOR IMMUNIZATION  0   • Omega-3 Fatty Acids (FISH OIL) 1000 MG capsule capsule Take 1,000 mg by mouth Daily With Breakfast.     • celecoxib (CELEBREX) 200 MG capsule Every 12 (Twelve) Hours.       No facility-administered medications prior to visit.        Patient Active Problem List   Diagnosis   • Essential hypertension   • Other hyperlipidemia   • Primary osteoarthritis of knee   • Elevated serum creatinine       Advance Care Planning:  has an advance directive - a copy HAS NOT been provided. Have asked the patient to send this to us to add to record.    Identification of Risk Factors:  Risk factors include: cardiovascular risk.    Review of Systems   Constitutional: Negative for chills and fever.   HENT: Negative for congestion, rhinorrhea, sinus pain and sore throat.    Eyes: Negative for photophobia and visual disturbance.   Respiratory: Negative for cough, chest tightness and shortness of breath.    Cardiovascular: Negative for chest pain and palpitations.   Gastrointestinal: Negative for diarrhea, nausea and vomiting.   Genitourinary: Negative for dysuria, frequency and urgency.   Skin: Negative for rash and wound.   Neurological: Negative for dizziness and syncope.   Psychiatric/Behavioral: Negative for behavioral problems and confusion.       Compared to one year ago, the patient feels her physical health is the same.  Compared to one year ago, the patient feels her mental health is the same.    Objective     Physical Exam   Constitutional: She is oriented to person, place, and time. She appears well-developed and  "well-nourished.   HENT:   Head: Normocephalic and atraumatic.   Right Ear: External ear normal.   Left Ear: External ear normal.   Mouth/Throat: Oropharynx is clear and moist.   Eyes: EOM are normal.   Neck: Normal range of motion. Neck supple.   Cardiovascular: Normal rate, regular rhythm and normal heart sounds.   Pulmonary/Chest: Effort normal and breath sounds normal. No respiratory distress.   Abdominal: Soft. There is no tenderness. There is no guarding.   Musculoskeletal: Normal range of motion.   Lymphadenopathy:     She has no cervical adenopathy.   Neurological: She is alert and oriented to person, place, and time.   Skin: Skin is warm.   Psychiatric: She has a normal mood and affect. Her behavior is normal.   Nursing note and vitals reviewed.      Vitals:    12/10/18 0952   BP: 138/82   BP Location: Left arm   Patient Position: Sitting   Cuff Size: Adult   Pulse: 68   Resp: 16   SpO2: 95%   Weight: 68 kg (150 lb)   Height: 170.2 cm (67\")   PainSc:   3       Patient's Body mass index is 23.49 kg/m². BMI is within normal parameters. No follow-up required.      Assessment/Plan   Patient Self-Management and Personalized Health Advice  The patient has been provided with information about: diet, exercise, weight management and prevention of cardiac or vascular disease and preventive services including:   · Diabetes screening, see lab orders, Exercise counseling provided, Nutrition counseling provided, TdaP vaccine.  · Essential hypertension-patient's currently stable we'll continue patient on hydrocodone thiazide 25 mg daily.  · Hyperlipidemia-patient's currently stable, will continue Crestor 5 mg daily.  We'll check lipid panel today's visit.  · Immunizations-patient is up-to-date with immunizations including Pneumovax 23 as well as flu vaccine.  Patient is unsure about taking a TdaP at this time, as she believes she has taken when she was going from international travel.  Patient also believes a hepatitis A " vaccine also given during this time as well.  · Patient is up-to-date on her mammograms and colonoscopy.    Visit Diagnoses:    ICD-10-CM ICD-9-CM   1. Medicare annual wellness visit, subsequent Z00.00 V70.0   2. Well woman exam (no gynecological exam) Z00.00 V70.0   3. Other hyperlipidemia E78.49 272.4   4. Essential hypertension I10 401.9   5. Screening for diabetes mellitus Z13.1 V77.1   6. Screening for thyroid disorder Z13.29 V77.0   7. Elevated serum glucose R73.9 790.29   8. Screening for lipid disorders Z13.220 V77.91       Orders Placed This Encounter   Procedures   • Lipid Panel With LDL / HDL Ratio   • Comprehensive Metabolic Panel   • Thyroid Panel With TSH   • Hemoglobin A1c   • CBC & Differential     Order Specific Question:   Manual Differential     Answer:   No       Outpatient Encounter Medications as of 12/10/2018   Medication Sig Dispense Refill   • aspirin 81 MG EC tablet Take 81 mg by mouth Daily.     • Calcium Carbonate-Vitamin D (CALCIUM-CARB 600 + D PO) Take 1 tablet by mouth Daily.     • CRANBERRY PO Take 1 tablet by mouth Daily.     • glucosamine-chondroitin 500-400 MG capsule capsule Take 1 capsule by mouth Daily.     • hydrochlorothiazide (HYDRODIURIL) 25 MG tablet Take 1 tablet by mouth Daily. 90 tablet 1   • Multiple Vitamin (MULTI VITAMIN DAILY PO) Take 1 tablet by mouth Daily.     • rosuvastatin (CRESTOR) 5 MG tablet Take 1 tablet by mouth Daily. 90 tablet 1   • [DISCONTINUED] hydrochlorothiazide (HYDRODIURIL) 25 MG tablet TAKE 1 TABLET BY MOUTH DAILY. 90 tablet 1   • [DISCONTINUED] letrozole (FEMARA) 2.5 MG tablet Take 2.5 mg by mouth Daily.     • [DISCONTINUED] rosuvastatin (CRESTOR) 5 MG tablet TAKE 1 TABLET BY MOUTH DAILY. 90 tablet 0   • FLUZONE HIGH-DOSE 0.5 ML suspension prefilled syringe injection TO BE ADMINISTERED BY PHARMACIST FOR IMMUNIZATION  0   • Omega-3 Fatty Acids (FISH OIL) 1000 MG capsule capsule Take 1,000 mg by mouth Daily With Breakfast.     • [DISCONTINUED]  celecoxib (CELEBREX) 200 MG capsule Every 12 (Twelve) Hours.       No facility-administered encounter medications on file as of 12/10/2018.        Reviewed use of high risk medication in the elderly: yes  Reviewed for potential of harmful drug interactions in the elderly: yes    Follow Up:  No Follow-up on file.     An After Visit Summary and PPPS with all of these plans were given to the patient.

## 2018-12-11 LAB
ALBUMIN SERPL-MCNC: 4.9 G/DL (ref 3.5–5.2)
ALBUMIN/GLOB SERPL: 1.6 G/DL
ALP SERPL-CCNC: 87 U/L (ref 39–117)
ALT SERPL-CCNC: 21 U/L (ref 1–33)
AST SERPL-CCNC: 21 U/L (ref 1–32)
BASOPHILS # BLD AUTO: 0.07 10*3/MM3 (ref 0–0.2)
BASOPHILS NFR BLD AUTO: 0.7 % (ref 0–1.5)
BILIRUB SERPL-MCNC: 0.7 MG/DL (ref 0.1–1.2)
BUN SERPL-MCNC: 20 MG/DL (ref 8–23)
BUN/CREAT SERPL: 15.7 (ref 7–25)
CALCIUM SERPL-MCNC: 10.5 MG/DL (ref 8.6–10.5)
CHLORIDE SERPL-SCNC: 98 MMOL/L (ref 98–107)
CHOLEST SERPL-MCNC: 173 MG/DL (ref 0–200)
CO2 SERPL-SCNC: 29.3 MMOL/L (ref 22–29)
CREAT SERPL-MCNC: 1.27 MG/DL (ref 0.57–1)
EOSINOPHIL # BLD AUTO: 0.3 10*3/MM3 (ref 0–0.7)
EOSINOPHIL NFR BLD AUTO: 3.2 % (ref 0.3–6.2)
ERYTHROCYTE [DISTWIDTH] IN BLOOD BY AUTOMATED COUNT: 14.7 % (ref 11.7–13)
FT4I SERPL CALC-MCNC: 1.7 (ref 1.2–4.9)
GLOBULIN SER CALC-MCNC: 3 GM/DL
GLUCOSE SERPL-MCNC: 100 MG/DL (ref 65–99)
HBA1C MFR BLD: 5.6 % (ref 4.8–5.6)
HCT VFR BLD AUTO: 46.6 % (ref 35.6–45.5)
HDLC SERPL-MCNC: 63 MG/DL (ref 40–60)
HGB BLD-MCNC: 14.6 G/DL (ref 11.9–15.5)
IMM GRANULOCYTES # BLD: 0.02 10*3/MM3 (ref 0–0.03)
IMM GRANULOCYTES NFR BLD: 0.2 % (ref 0–0.5)
LDLC SERPL CALC-MCNC: 82 MG/DL (ref 0–100)
LDLC/HDLC SERPL: 1.3 {RATIO}
LYMPHOCYTES # BLD AUTO: 1.56 10*3/MM3 (ref 0.9–4.8)
LYMPHOCYTES NFR BLD AUTO: 16.6 % (ref 19.6–45.3)
MCH RBC QN AUTO: 31.8 PG (ref 26.9–32)
MCHC RBC AUTO-ENTMCNC: 31.3 G/DL (ref 32.4–36.3)
MCV RBC AUTO: 101.5 FL (ref 80.5–98.2)
MONOCYTES # BLD AUTO: 1.04 10*3/MM3 (ref 0.2–1.2)
MONOCYTES NFR BLD AUTO: 11.1 % (ref 5–12)
NEUTROPHILS # BLD AUTO: 6.4 10*3/MM3 (ref 1.9–8.1)
NEUTROPHILS NFR BLD AUTO: 68.2 % (ref 42.7–76)
PLATELET # BLD AUTO: 295 10*3/MM3 (ref 140–500)
POTASSIUM SERPL-SCNC: 3.9 MMOL/L (ref 3.5–5.2)
PROT SERPL-MCNC: 7.9 G/DL (ref 6–8.5)
RBC # BLD AUTO: 4.59 10*6/MM3 (ref 3.9–5.2)
SODIUM SERPL-SCNC: 144 MMOL/L (ref 136–145)
T3RU NFR SERPL: 26 % (ref 24–39)
T4 SERPL-MCNC: 6.5 UG/DL (ref 4.5–12)
TRIGL SERPL-MCNC: 141 MG/DL (ref 0–150)
TSH SERPL DL<=0.005 MIU/L-ACNC: 2.91 UIU/ML (ref 0.45–4.5)
VLDLC SERPL CALC-MCNC: 28.2 MG/DL (ref 5–40)
WBC # BLD AUTO: 9.39 10*3/MM3 (ref 4.5–10.7)

## 2018-12-12 NOTE — PROGRESS NOTES
Please inform the patient of the following abnormal results.  Labs are stable. Will continue to monitor.   Does have macrocytosis without anemia, which we will monitor.

## 2018-12-15 ENCOUNTER — TELEPHONE (OUTPATIENT)
Dept: INTERNAL MEDICINE | Facility: CLINIC | Age: 73
End: 2018-12-15

## 2018-12-15 NOTE — TELEPHONE ENCOUNTER
----- Message from Bk Mendez MD sent at 12/12/2018 11:55 AM EST -----  Please inform the patient of the following abnormal results.  Labs are stable. Will continue to monitor.   Does have macrocytosis without anemia, which we will monitor.

## 2019-01-02 ENCOUNTER — CLINICAL SUPPORT (OUTPATIENT)
Dept: INTERNAL MEDICINE | Facility: CLINIC | Age: 74
End: 2019-01-02

## 2019-01-02 DIAGNOSIS — Z23 NEED FOR TDAP VACCINATION: Primary | ICD-10-CM

## 2019-01-02 PROCEDURE — 90471 IMMUNIZATION ADMIN: CPT | Performed by: FAMILY MEDICINE

## 2019-01-02 PROCEDURE — 90715 TDAP VACCINE 7 YRS/> IM: CPT | Performed by: FAMILY MEDICINE

## 2019-06-10 ENCOUNTER — OFFICE VISIT (OUTPATIENT)
Dept: INTERNAL MEDICINE | Facility: CLINIC | Age: 74
End: 2019-06-10

## 2019-06-10 VITALS
HEIGHT: 67 IN | OXYGEN SATURATION: 99 % | SYSTOLIC BLOOD PRESSURE: 122 MMHG | HEART RATE: 73 BPM | WEIGHT: 152 LBS | BODY MASS INDEX: 23.86 KG/M2 | DIASTOLIC BLOOD PRESSURE: 82 MMHG

## 2019-06-10 DIAGNOSIS — E78.49 OTHER HYPERLIPIDEMIA: ICD-10-CM

## 2019-06-10 DIAGNOSIS — Z12.39 SCREENING FOR BREAST CANCER: ICD-10-CM

## 2019-06-10 DIAGNOSIS — D75.89 MACROCYTOSIS: ICD-10-CM

## 2019-06-10 DIAGNOSIS — R79.89 ELEVATED SERUM CREATININE: ICD-10-CM

## 2019-06-10 DIAGNOSIS — I10 ESSENTIAL HYPERTENSION: Primary | ICD-10-CM

## 2019-06-10 PROBLEM — M16.9 OSTEOARTHRITIS OF HIP: Status: ACTIVE | Noted: 2019-06-10

## 2019-06-10 LAB
BASOPHILS # BLD AUTO: 0.1 10*3/MM3 (ref 0–0.2)
BASOPHILS NFR BLD AUTO: 1.3 % (ref 0–1.5)
EOSINOPHIL # BLD AUTO: 0.28 10*3/MM3 (ref 0–0.4)
EOSINOPHIL NFR BLD AUTO: 3.6 % (ref 0.3–6.2)
ERYTHROCYTE [DISTWIDTH] IN BLOOD BY AUTOMATED COUNT: 14.3 % (ref 12.3–15.4)
HCT VFR BLD AUTO: 42.8 % (ref 34–46.6)
HGB BLD-MCNC: 13.4 G/DL (ref 12–15.9)
IMM GRANULOCYTES # BLD AUTO: 0.03 10*3/MM3 (ref 0–0.05)
IMM GRANULOCYTES NFR BLD AUTO: 0.4 % (ref 0–0.5)
LYMPHOCYTES # BLD AUTO: 1.14 10*3/MM3 (ref 0.7–3.1)
LYMPHOCYTES NFR BLD AUTO: 14.5 % (ref 19.6–45.3)
MCH RBC QN AUTO: 31.7 PG (ref 26.6–33)
MCHC RBC AUTO-ENTMCNC: 31.3 G/DL (ref 31.5–35.7)
MCV RBC AUTO: 101.2 FL (ref 79–97)
MONOCYTES # BLD AUTO: 0.78 10*3/MM3 (ref 0.1–0.9)
MONOCYTES NFR BLD AUTO: 9.9 % (ref 5–12)
NEUTROPHILS # BLD AUTO: 5.51 10*3/MM3 (ref 1.7–7)
NEUTROPHILS NFR BLD AUTO: 70.3 % (ref 42.7–76)
NRBC BLD AUTO-RTO: 0 /100 WBC (ref 0–0.2)
PLATELET # BLD AUTO: 259 10*3/MM3 (ref 140–450)
RBC # BLD AUTO: 4.23 10*6/MM3 (ref 3.77–5.28)
WBC # BLD AUTO: 7.84 10*3/MM3 (ref 3.4–10.8)

## 2019-06-10 PROCEDURE — 99214 OFFICE O/P EST MOD 30 MIN: CPT | Performed by: FAMILY MEDICINE

## 2019-06-10 RX ORDER — LANOLIN ALCOHOL/MO/W.PET/CERES
3 CREAM (GRAM) TOPICAL NIGHTLY PRN
COMMUNITY

## 2019-06-10 NOTE — PROGRESS NOTES
Subjective   Marcia Ulloa is a 74 y.o. female.     Chief Complaint   Patient presents with   • Hyperlipidemia   • Hypertension     patient stated that her blood pressure has been running in the 140's/90's at home         History of Present Illness     Patient has a past medical history for essential hypertension.  Patient blood pressure 122/82.  Patient is currently taking hydrochlorothiazide 25 mg daily.  Patient denies any side effects of the medication.    Patient has a history of elevated serum creatinine levels.  Patient will go see nephrology regarding this.  She states that she is avoiding NSAIDs.    Patient also has hyperlipidemia.  Patient is currently on Crestor 5 mg daily.  Patient denies any side effects of the medication.    The following portions of the patient's history were reviewed and updated as appropriate: allergies, current medications, past family history, past medical history, past social history, past surgical history and problem list.    Review of Systems   Constitutional: Negative for chills and fever.   HENT: Negative for congestion, rhinorrhea, sinus pain and sore throat.    Eyes: Negative for photophobia and visual disturbance.   Respiratory: Negative for cough, chest tightness and shortness of breath.    Cardiovascular: Negative for chest pain and palpitations.   Gastrointestinal: Negative for diarrhea, nausea and vomiting.   Genitourinary: Negative for dysuria, frequency and urgency.   Skin: Negative for rash and wound.   Neurological: Negative for dizziness and syncope.   Psychiatric/Behavioral: Negative for behavioral problems and confusion.       Objective   Physical Exam   Constitutional: She is oriented to person, place, and time. She appears well-developed and well-nourished.   HENT:   Head: Normocephalic and atraumatic.   Right Ear: External ear normal.   Left Ear: External ear normal.   Eyes: EOM are normal.   Neck: Normal range of motion. Neck supple.   Cardiovascular:  Normal rate, regular rhythm and normal heart sounds.   Pulmonary/Chest: Effort normal and breath sounds normal. No respiratory distress.   Musculoskeletal: Normal range of motion.   Lymphadenopathy:     She has no cervical adenopathy.   Neurological: She is alert and oriented to person, place, and time.   Skin: Skin is warm.   Psychiatric: She has a normal mood and affect. Her behavior is normal.   Nursing note and vitals reviewed.      Assessment/Plan   Marcia was seen today for hyperlipidemia and hypertension.    Diagnoses and all orders for this visit:    Essential hypertension  -     Comprehensive Metabolic Panel  -     Continue hctz 25mg daily.     Other hyperlipidemia  -     Comprehensive Metabolic Panel  -     Lipid Panel With LDL / HDL Ratio  -     Continue crestor 5mg daily.     Elevated serum creatinine        -    Continue to follow up with nephrology.     Screening for breast cancer  -     Mammo Screening Bilateral With CAD    Macrocytosis  -     CBC & Differential  -     If continues will need to check folate and b12 levels.           No Follow-up on file.    Dictated utilizing Dragon Voice Recognition Software

## 2019-06-11 ENCOUNTER — TRANSCRIBE ORDERS (OUTPATIENT)
Dept: ADMINISTRATIVE | Facility: HOSPITAL | Age: 74
End: 2019-06-11

## 2019-06-11 DIAGNOSIS — Z12.31 VISIT FOR SCREENING MAMMOGRAM: Primary | ICD-10-CM

## 2019-06-11 LAB
ALBUMIN SERPL-MCNC: 4.3 G/DL (ref 3.5–5.2)
ALBUMIN/GLOB SERPL: 1.4 G/DL
ALP SERPL-CCNC: 86 U/L (ref 39–117)
ALT SERPL-CCNC: 15 U/L (ref 1–33)
AST SERPL-CCNC: 17 U/L (ref 1–32)
BILIRUB SERPL-MCNC: 0.6 MG/DL (ref 0.2–1.2)
BUN SERPL-MCNC: 20 MG/DL (ref 8–23)
BUN/CREAT SERPL: 16.5 (ref 7–25)
CALCIUM SERPL-MCNC: 10.2 MG/DL (ref 8.6–10.5)
CHLORIDE SERPL-SCNC: 99 MMOL/L (ref 98–107)
CHOLEST SERPL-MCNC: 146 MG/DL (ref 0–200)
CO2 SERPL-SCNC: 30.1 MMOL/L (ref 22–29)
CREAT SERPL-MCNC: 1.21 MG/DL (ref 0.57–1)
GLOBULIN SER CALC-MCNC: 3 GM/DL
GLUCOSE SERPL-MCNC: 108 MG/DL (ref 65–99)
HDLC SERPL-MCNC: 54 MG/DL (ref 40–60)
LDLC SERPL CALC-MCNC: 60 MG/DL (ref 0–100)
LDLC/HDLC SERPL: 1.12 {RATIO}
POTASSIUM SERPL-SCNC: 3.5 MMOL/L (ref 3.5–5.2)
PROT SERPL-MCNC: 7.3 G/DL (ref 6–8.5)
SODIUM SERPL-SCNC: 142 MMOL/L (ref 136–145)
TRIGL SERPL-MCNC: 158 MG/DL (ref 0–150)
VLDLC SERPL CALC-MCNC: 31.6 MG/DL

## 2019-06-20 ENCOUNTER — TELEPHONE (OUTPATIENT)
Dept: INTERNAL MEDICINE | Facility: CLINIC | Age: 74
End: 2019-06-20

## 2019-06-20 NOTE — TELEPHONE ENCOUNTER
----- Message from Bk Mendez MD sent at 6/12/2019  5:42 PM EDT -----  The labs were reviewed. Please inform patient that labs were normal.

## 2019-06-25 ENCOUNTER — HOSPITAL ENCOUNTER (OUTPATIENT)
Dept: MAMMOGRAPHY | Facility: HOSPITAL | Age: 74
Discharge: HOME | End: 2019-06-25
Admitting: FAMILY MEDICINE

## 2019-06-25 DIAGNOSIS — Z12.31 VISIT FOR SCREENING MAMMOGRAM: ICD-10-CM

## 2019-06-25 PROCEDURE — 77067 SCR MAMMO BI INCL CAD: CPT

## 2019-06-25 PROCEDURE — 77063 BREAST TOMOSYNTHESIS BI: CPT

## 2019-07-15 ENCOUNTER — TELEPHONE (OUTPATIENT)
Dept: INTERNAL MEDICINE | Facility: CLINIC | Age: 74
End: 2019-07-15

## 2019-07-15 NOTE — TELEPHONE ENCOUNTER
----- Message from Bk Mendez MD sent at 6/27/2019  6:20 PM EDT -----  No radiographic evidence of malignancy.      BI-RADS Category 2:  Benign.

## 2019-07-15 NOTE — TELEPHONE ENCOUNTER
Patient's  notified (okay per HIPAA) and voiced understanding. Patient's  advised to have patient contact office if they have any questions. Patient's  stated that patient received a letter in the mail notifying her of this.

## 2019-07-25 RX ORDER — HYDROCHLOROTHIAZIDE 25 MG/1
25 TABLET ORAL DAILY
Qty: 90 TABLET | Refills: 1 | Status: SHIPPED | OUTPATIENT
Start: 2019-07-25 | End: 2020-01-24

## 2019-08-17 DIAGNOSIS — E78.49 OTHER HYPERLIPIDEMIA: ICD-10-CM

## 2019-08-19 RX ORDER — ROSUVASTATIN CALCIUM 5 MG/1
TABLET, COATED ORAL
Qty: 90 TABLET | Refills: 0 | Status: SHIPPED | OUTPATIENT
Start: 2019-08-19 | End: 2019-12-08 | Stop reason: SDUPTHER

## 2019-10-24 ENCOUNTER — TRANSCRIBE ORDERS (OUTPATIENT)
Dept: ADMINISTRATIVE | Facility: HOSPITAL | Age: 74
End: 2019-10-24

## 2019-10-24 DIAGNOSIS — N95.0 POSTMENOPAUSAL BLEEDING: Primary | ICD-10-CM

## 2019-10-28 ENCOUNTER — HOSPITAL ENCOUNTER (OUTPATIENT)
Dept: ULTRASOUND IMAGING | Facility: HOSPITAL | Age: 74
Discharge: HOME | End: 2019-10-28
Admitting: OBSTETRICS & GYNECOLOGY

## 2019-10-28 DIAGNOSIS — N95.0 POSTMENOPAUSAL BLEEDING: ICD-10-CM

## 2019-10-28 PROCEDURE — 76830 TRANSVAGINAL US NON-OB: CPT

## 2019-10-28 PROCEDURE — 76856 US EXAM PELVIC COMPLETE: CPT

## 2019-12-08 DIAGNOSIS — E78.49 OTHER HYPERLIPIDEMIA: ICD-10-CM

## 2019-12-09 RX ORDER — ROSUVASTATIN CALCIUM 5 MG/1
TABLET, COATED ORAL
Qty: 90 TABLET | Refills: 0 | Status: SHIPPED | OUTPATIENT
Start: 2019-12-09 | End: 2020-03-11

## 2019-12-11 ENCOUNTER — OFFICE VISIT (OUTPATIENT)
Dept: INTERNAL MEDICINE | Facility: CLINIC | Age: 74
End: 2019-12-11

## 2019-12-11 VITALS
SYSTOLIC BLOOD PRESSURE: 122 MMHG | RESPIRATION RATE: 16 BRPM | HEART RATE: 76 BPM | WEIGHT: 171 LBS | DIASTOLIC BLOOD PRESSURE: 84 MMHG | BODY MASS INDEX: 26.84 KG/M2 | TEMPERATURE: 98.1 F | OXYGEN SATURATION: 98 % | HEIGHT: 67 IN

## 2019-12-11 DIAGNOSIS — Z13.220 SCREENING FOR LIPID DISORDERS: ICD-10-CM

## 2019-12-11 DIAGNOSIS — Z13.29 SCREENING FOR THYROID DISORDER: ICD-10-CM

## 2019-12-11 DIAGNOSIS — Z13.1 SCREENING FOR DIABETES MELLITUS: ICD-10-CM

## 2019-12-11 DIAGNOSIS — Z00.00 MEDICARE ANNUAL WELLNESS VISIT, SUBSEQUENT: ICD-10-CM

## 2019-12-11 DIAGNOSIS — R79.9 ABNORMAL FINDING OF BLOOD CHEMISTRY, UNSPECIFIED: ICD-10-CM

## 2019-12-11 DIAGNOSIS — Z00.00 WELL WOMAN EXAM (NO GYNECOLOGICAL EXAM): Primary | ICD-10-CM

## 2019-12-11 PROCEDURE — 99397 PER PM REEVAL EST PAT 65+ YR: CPT | Performed by: FAMILY MEDICINE

## 2019-12-11 PROCEDURE — G0439 PPPS, SUBSEQ VISIT: HCPCS | Performed by: FAMILY MEDICINE

## 2019-12-11 PROCEDURE — 96160 PT-FOCUSED HLTH RISK ASSMT: CPT | Performed by: FAMILY MEDICINE

## 2019-12-11 NOTE — PROGRESS NOTES
The ABCs of the Annual Wellness Visit  Subsequent Medicare Wellness Visit    Chief Complaint   Patient presents with   • Medicare Wellness-subsequent       Subjective   History of Present Illness:  Marcia Ulloa is a 74 y.o. female who presents for a Subsequent Medicare Wellness Visit.    HEALTH RISK ASSESSMENT    Recent Hospitalizations:  No hospitalization(s) within the last year.    Current Medical Providers:  Patient Care Team:  Bk Mendez MD as PCP - General (Family Medicine)  Senia Frederick MD as Consulting Physician (Obstetrics and Gynecology)  Thaddeus Stafford MD as Consulting Physician (Orthopedic Surgery)  Elayne Paulino MD as Consulting Physician (Gynecology)  Hunter Benito DC (Chiropractic Medicine)  Shahrzad Maza MD as Consulting Physician (Ophthalmology)  Kostas Loomis MD (Dermatology)    Smoking Status:  Social History     Tobacco Use   Smoking Status Never Smoker   Smokeless Tobacco Never Used       Alcohol Consumption:  Social History     Substance and Sexual Activity   Alcohol Use Yes    Comment: 1/day       Depression Screen:   PHQ-2/PHQ-9 Depression Screening 12/11/2019   Little interest or pleasure in doing things 0   Feeling down, depressed, or hopeless 0   Trouble falling or staying asleep, or sleeping too much 3   Feeling tired or having little energy 0   Poor appetite or overeating 0   Feeling bad about yourself - or that you are a failure or have let yourself or your family down 0   Trouble concentrating on things, such as reading the newspaper or watching television 0   Moving or speaking so slowly that other people could have noticed. Or the opposite - being so fidgety or restless that you have been moving around a lot more than usual 0   Thoughts that you would be better off dead, or of hurting yourself in some way 0   Total Score 3   If you checked off any problems, how difficult have these problems made it for you to do your work, take care of things at  home, or get along with other people? Not difficult at all       Fall Risk Screen:  LEONARDO Fall Risk Assessment has not been completed.    Health Habits and Functional and Cognitive Screening:  Functional & Cognitive Status 12/11/2019   Do you have difficulty preparing food and eating? No   Do you have difficulty bathing yourself, getting dressed or grooming yourself? No   Do you have difficulty using the toilet? Yes   Do you have difficulty moving around from place to place? No   Do you have trouble with steps or getting out of a bed or a chair? No   Current Diet Well Balanced Diet   Dental Exam Up to date   Eye Exam Up to date   Exercise (times per week) 7 times per week   Current Exercise Activities Include Walking   Do you need help using the phone?  No   Are you deaf or do you have serious difficulty hearing?  No   Do you need help with transportation? No   Do you need help shopping? No   Do you need help preparing meals?  No   Do you need help with housework?  No   Do you need help with laundry? No   Do you need help taking your medications? No   Do you need help managing money? No   Do you ever drive or ride in a car without wearing a seat belt? No   Have you felt unusual stress, anger or loneliness in the last month? No   Who do you live with? Spouse   If you need help, do you have trouble finding someone available to you? No   Have you been bothered in the last four weeks by sexual problems? No   Do you have difficulty concentrating, remembering or making decisions? No         Does the patient have evidence of cognitive impairment? No    Asprin use counseling:Taking ASA appropriately as indicated    Age-appropriate Screening Schedule:  Refer to the list below for future screening recommendations based on patient's age, sex and/or medical conditions. Orders for these recommended tests are listed in the plan section. The patient has been provided with a written plan.    Health Maintenance   Topic Date Due   •  ZOSTER VACCINE (1 of 2) 04/09/1996   • LIPID PANEL  06/10/2020   • MAMMOGRAM  06/25/2021   • COLONOSCOPY  08/12/2024   • TDAP/TD VACCINES (2 - Td) 01/02/2029   • INFLUENZA VACCINE  Completed   • PNEUMOCOCCAL VACCINES (65+ LOW/MEDIUM RISK)  Completed          The following portions of the patient's history were reviewed and updated as appropriate: allergies, current medications, past family history, past medical history, past social history, past surgical history and problem list.    Outpatient Medications Prior to Visit   Medication Sig Dispense Refill   • aspirin 81 MG EC tablet Take 81 mg by mouth Daily.     • Calcium Carbonate-Vitamin D (CALCIUM-CARB 600 + D PO) Take 1 tablet by mouth Daily.     • CRANBERRY PO Take 1 tablet by mouth Daily.     • glucosamine-chondroitin 500-400 MG capsule capsule Take 1 capsule by mouth Daily.     • hydrochlorothiazide (HYDRODIURIL) 25 MG tablet TAKE 1 TABLET BY MOUTH DAILY. 90 tablet 1   • melatonin 3 MG tablet Take 3 mg by mouth At Night As Needed for Sleep.     • Multiple Vitamin (MULTI VITAMIN DAILY PO) Take 1 tablet by mouth Daily.     • rosuvastatin (CRESTOR) 5 MG tablet TAKE 1 TABLET BY MOUTH EVERY DAY 90 tablet 0   • TURMERIC PO Take 1 tablet by mouth Daily.       No facility-administered medications prior to visit.        Patient Active Problem List   Diagnosis   • Essential hypertension   • Other hyperlipidemia   • Primary osteoarthritis of knee   • Elevated serum creatinine   • Acquired absence of breast and absent nipple   • Asymptomatic postmenopausal status   • Breast cancer, right breast (CMS/HCC)   • Osteoarthritis of hip   • Personal history of breast cancer       Advanced Care Planning:  Patient has an advance directive - a copy has been provided and is visible in patient header    Review of Systems    Compared to one year ago, the patient feels her physical health is worse.  Compared to one year ago, the patient feels her mental health is better.    Reviewed  "chart for potential of high risk medication in the elderly: yes  Reviewed chart for potential of harmful drug interactions in the elderly:yes    Objective         Vitals:    12/11/19 0917   BP: 122/84   BP Location: Left arm   Patient Position: Sitting   Pulse: 76   Resp: 16   Temp: 98.1 °F (36.7 °C)   TempSrc: Oral   SpO2: 98%   Weight: 77.6 kg (171 lb)   Height: 170.2 cm (67.01\")   PainSc:   4   PainLoc: Knee       Body mass index is 26.78 kg/m².  Discussed the patient's BMI with her. The BMI is in the acceptable range.    Physical Exam          Assessment/Plan   Medicare Risks and Personalized Health Plan  CMS Preventative Services Quick Reference  Cardiovascular risk    The above risks/problems have been discussed with the patient.  Pertinent information has been shared with the patient in the After Visit Summary.  Follow up plans and orders are seen below in the Assessment/Plan Section.    Diagnoses and all orders for this visit:    1. Well woman exam (no gynecological exam) (Primary)  -     CBC & Differential  -     Comprehensive Metabolic Panel    2. Medicare annual wellness visit, subsequent    3. Screening for thyroid disorder  -     Thyroid Panel With TSH    4. Screening for lipid disorders  -     Lipid Panel With LDL / HDL Ratio    5. Screening for diabetes mellitus  -     Hemoglobin A1c    6. Abnormal finding of blood chemistry, unspecified   -     Hemoglobin A1c      Follow Up:  No follow-ups on file.     An After Visit Summary and PPPS were given to the patient.             "

## 2019-12-11 NOTE — PROGRESS NOTES
Subjective   Marcia Ulloa is a 74 y.o. female and is here for a comprehensive physical exam. The patient reports no problems.    Pt is UTD with mammo.           Social History:   Social History     Socioeconomic History   • Marital status:      Spouse name: Not on file   • Number of children: Not on file   • Years of education: Not on file   • Highest education level: Not on file   Tobacco Use   • Smoking status: Never Smoker   • Smokeless tobacco: Never Used   Substance and Sexual Activity   • Alcohol use: Yes     Comment: 1/day   • Drug use: No   • Sexual activity: Never       Family History:   Family History   Problem Relation Age of Onset   • COPD Mother    • Heart failure Mother    • Hypertension Mother    • Heart disease Mother    • Depression Mother    • Vision loss Mother    • Heart failure Father    • Hypertension Father    • Heart disease Father    • Vision loss Maternal Grandfather        Past Medical History:   Past Medical History:   Diagnosis Date   • Breast cancer (CMS/[facility]) 2010   • Drug therapy     femara 5-7 years.   • Hyperlipidemia    • Hypertension    • Shingles        The following portions of the patient's history were reviewed and updated as appropriate: allergies, current medications, past family history, past medical history, past social history, past surgical history and problem list.    Review of Systems    Review of Systems   Constitutional: Negative for chills and fever.   HENT: Negative for congestion, rhinorrhea, sinus pain and sore throat.    Eyes: Negative for photophobia and visual disturbance.   Respiratory: Negative for cough, chest tightness and shortness of breath.    Cardiovascular: Negative for chest pain and palpitations.   Gastrointestinal: Negative for diarrhea, nausea and vomiting.   Genitourinary: Negative for dysuria, frequency and urgency.   Skin: Negative for rash and wound.   Neurological: Negative for dizziness and syncope.   Psychiatric/Behavioral: Negative  for behavioral problems and confusion.       Objective   Physical Exam   Constitutional: She is oriented to person, place, and time. She appears well-developed and well-nourished.   HENT:   Head: Normocephalic and atraumatic.   Right Ear: External ear normal.   Left Ear: External ear normal.   Mouth/Throat: Oropharynx is clear and moist.   Eyes: EOM are normal.   Neck: Normal range of motion. Neck supple.   Cardiovascular: Normal rate, regular rhythm and normal heart sounds.   Pulmonary/Chest: Effort normal and breath sounds normal. No respiratory distress.   Abdominal: Soft. There is no tenderness. There is no guarding.   Musculoskeletal: Normal range of motion.   Lymphadenopathy:     She has no cervical adenopathy.   Neurological: She is alert and oriented to person, place, and time.   Skin: Skin is warm.   Psychiatric: She has a normal mood and affect. Her behavior is normal.   Nursing note and vitals reviewed.      Vitals:    12/11/19 0917   BP: 122/84   Pulse: 76   Resp: 16   Temp: 98.1 °F (36.7 °C)   SpO2: 98%     Body mass index is 26.78 kg/m².      Medications:   Current Outpatient Medications:   •  aspirin 81 MG EC tablet, Take 81 mg by mouth Daily., Disp: , Rfl:   •  Calcium Carbonate-Vitamin D (CALCIUM-CARB 600 + D PO), Take 1 tablet by mouth Daily., Disp: , Rfl:   •  CRANBERRY PO, Take 1 tablet by mouth Daily., Disp: , Rfl:   •  glucosamine-chondroitin 500-400 MG capsule capsule, Take 1 capsule by mouth Daily., Disp: , Rfl:   •  hydrochlorothiazide (HYDRODIURIL) 25 MG tablet, TAKE 1 TABLET BY MOUTH DAILY., Disp: 90 tablet, Rfl: 1  •  melatonin 3 MG tablet, Take 3 mg by mouth At Night As Needed for Sleep., Disp: , Rfl:   •  Multiple Vitamin (MULTI VITAMIN DAILY PO), Take 1 tablet by mouth Daily., Disp: , Rfl:   •  rosuvastatin (CRESTOR) 5 MG tablet, TAKE 1 TABLET BY MOUTH EVERY DAY, Disp: 90 tablet, Rfl: 0  •  TURMERIC PO, Take 1 tablet by mouth Daily., Disp: , Rfl:        Assessment/Plan   Healthy female  exam.      1. Healthcare Maintenance:  2. Patient Counseling:  --Nutrition: Stressed importance of moderation in sodium/caffeine intake, saturated fat and cholesterol, caloric balance, sufficient intake of fresh fruits, vegetables, fiber, calcium and vit D  --Exercise: Recommended 30 minutes of exercise daily.  --Immunizations reviewed.      Diagnoses and all orders for this visit:    Well woman exam (no gynecological exam)  -     CBC & Differential  -     Comprehensive Metabolic Panel    Medicare annual wellness visit, subsequent    Screening for thyroid disorder  -     Thyroid Panel With TSH    Screening for lipid disorders  -     Lipid Panel With LDL / HDL Ratio    Screening for diabetes mellitus  -     Hemoglobin A1c    Abnormal finding of blood chemistry, unspecified   -     Hemoglobin A1c        No follow-ups on file.             Dictated utilizing Dragon Voice Recognition Software

## 2019-12-13 LAB
ALBUMIN SERPL-MCNC: 4.4 G/DL (ref 3.5–5.2)
ALBUMIN/GLOB SERPL: 1.7 G/DL
ALP SERPL-CCNC: 91 U/L (ref 39–117)
ALT SERPL-CCNC: 16 U/L (ref 1–33)
AST SERPL-CCNC: 17 U/L (ref 1–32)
BASOPHILS # BLD AUTO: 0.07 10*3/MM3 (ref 0–0.2)
BASOPHILS NFR BLD AUTO: 0.8 % (ref 0–1.5)
BILIRUB SERPL-MCNC: 0.4 MG/DL (ref 0.2–1.2)
BUN SERPL-MCNC: 19 MG/DL (ref 8–23)
BUN/CREAT SERPL: 16 (ref 7–25)
CALCIUM SERPL-MCNC: 10 MG/DL (ref 8.6–10.5)
CHLORIDE SERPL-SCNC: 100 MMOL/L (ref 98–107)
CHOLEST SERPL-MCNC: 154 MG/DL (ref 0–200)
CO2 SERPL-SCNC: 32.1 MMOL/L (ref 22–29)
CREAT SERPL-MCNC: 1.19 MG/DL (ref 0.57–1)
EOSINOPHIL # BLD AUTO: 0.42 10*3/MM3 (ref 0–0.4)
EOSINOPHIL NFR BLD AUTO: 4.6 % (ref 0.3–6.2)
ERYTHROCYTE [DISTWIDTH] IN BLOOD BY AUTOMATED COUNT: 13.2 % (ref 12.3–15.4)
FT4I SERPL CALC-MCNC: 1.8 (ref 1.2–4.9)
GLOBULIN SER CALC-MCNC: 2.6 GM/DL
GLUCOSE SERPL-MCNC: 98 MG/DL (ref 65–99)
HBA1C MFR BLD: 6.2 % (ref 4.8–5.6)
HCT VFR BLD AUTO: 40.4 % (ref 34–46.6)
HDLC SERPL-MCNC: 57 MG/DL (ref 40–60)
HGB BLD-MCNC: 13.6 G/DL (ref 12–15.9)
IMM GRANULOCYTES # BLD AUTO: 0.03 10*3/MM3 (ref 0–0.05)
IMM GRANULOCYTES NFR BLD AUTO: 0.3 % (ref 0–0.5)
LDLC SERPL CALC-MCNC: 68 MG/DL (ref 0–100)
LDLC/HDLC SERPL: 1.2 {RATIO}
LYMPHOCYTES # BLD AUTO: 1.8 10*3/MM3 (ref 0.7–3.1)
LYMPHOCYTES NFR BLD AUTO: 19.9 % (ref 19.6–45.3)
MCH RBC QN AUTO: 32.4 PG (ref 26.6–33)
MCHC RBC AUTO-ENTMCNC: 33.7 G/DL (ref 31.5–35.7)
MCV RBC AUTO: 96.2 FL (ref 79–97)
MONOCYTES # BLD AUTO: 0.93 10*3/MM3 (ref 0.1–0.9)
MONOCYTES NFR BLD AUTO: 10.3 % (ref 5–12)
NEUTROPHILS # BLD AUTO: 5.8 10*3/MM3 (ref 1.7–7)
NEUTROPHILS NFR BLD AUTO: 64.1 % (ref 42.7–76)
NRBC BLD AUTO-RTO: 0 /100 WBC (ref 0–0.2)
PLATELET # BLD AUTO: 278 10*3/MM3 (ref 140–450)
POTASSIUM SERPL-SCNC: 3.7 MMOL/L (ref 3.5–5.2)
PROT SERPL-MCNC: 7 G/DL (ref 6–8.5)
RBC # BLD AUTO: 4.2 10*6/MM3 (ref 3.77–5.28)
SODIUM SERPL-SCNC: 144 MMOL/L (ref 136–145)
T3RU NFR SERPL: 29 % (ref 24–39)
T4 SERPL-MCNC: 6.2 UG/DL (ref 4.5–12)
TRIGL SERPL-MCNC: 144 MG/DL (ref 0–150)
TSH SERPL DL<=0.005 MIU/L-ACNC: 3.88 UIU/ML (ref 0.45–4.5)
VLDLC SERPL CALC-MCNC: 28.8 MG/DL
WBC # BLD AUTO: 9.05 10*3/MM3 (ref 3.4–10.8)

## 2019-12-22 NOTE — PROGRESS NOTES
Please inform the patient of the following abnormal results.  Elevated serum creatnine levels, but appears to be stable.  Also prediabetic. Needs to monitor with diet and exercise.

## 2020-01-03 ENCOUNTER — HOSPITAL ENCOUNTER (OUTPATIENT)
Dept: GENERAL RADIOLOGY | Facility: HOSPITAL | Age: 75
Discharge: HOME | End: 2020-01-03
Admitting: OBSTETRICS & GYNECOLOGY

## 2020-01-03 ENCOUNTER — APPOINTMENT (OUTPATIENT)
Dept: PREADMISSION TESTING | Facility: HOSPITAL | Age: 75
End: 2020-01-03

## 2020-01-03 VITALS
RESPIRATION RATE: 18 BRPM | HEART RATE: 71 BPM | BODY MASS INDEX: 23.39 KG/M2 | WEIGHT: 149 LBS | TEMPERATURE: 98.1 F | DIASTOLIC BLOOD PRESSURE: 79 MMHG | HEIGHT: 67 IN | SYSTOLIC BLOOD PRESSURE: 122 MMHG | OXYGEN SATURATION: 95 %

## 2020-01-03 LAB
ABO GROUP BLD: NORMAL
ANION GAP SERPL CALCULATED.3IONS-SCNC: 12 MMOL/L (ref 5–15)
BACTERIA UR QL AUTO: ABNORMAL /HPF
BASOPHILS # BLD AUTO: 0.07 10*3/MM3 (ref 0–0.2)
BASOPHILS NFR BLD AUTO: 0.9 % (ref 0–1.5)
BILIRUB UR QL STRIP: NEGATIVE
BLD GP AB SCN SERPL QL: NEGATIVE
BUN BLD-MCNC: 17 MG/DL (ref 8–23)
BUN/CREAT SERPL: 14.2 (ref 7–25)
CALCIUM SPEC-SCNC: 9.3 MG/DL (ref 8.6–10.5)
CHLORIDE SERPL-SCNC: 94 MMOL/L (ref 98–107)
CLARITY UR: ABNORMAL
CO2 SERPL-SCNC: 29 MMOL/L (ref 22–29)
COLOR UR: YELLOW
CREAT BLD-MCNC: 1.2 MG/DL (ref 0.57–1)
DEPRECATED RDW RBC AUTO: 45.3 FL (ref 37–54)
EOSINOPHIL # BLD AUTO: 0.19 10*3/MM3 (ref 0–0.4)
EOSINOPHIL NFR BLD AUTO: 2.4 % (ref 0.3–6.2)
ERYTHROCYTE [DISTWIDTH] IN BLOOD BY AUTOMATED COUNT: 13.3 % (ref 12.3–15.4)
GFR SERPL CREATININE-BSD FRML MDRD: 44 ML/MIN/1.73
GLUCOSE BLD-MCNC: 101 MG/DL (ref 65–99)
GLUCOSE UR STRIP-MCNC: NEGATIVE MG/DL
HCT VFR BLD AUTO: 40.1 % (ref 34–46.6)
HGB BLD-MCNC: 13.7 G/DL (ref 12–15.9)
HGB UR QL STRIP.AUTO: NEGATIVE
HYALINE CASTS UR QL AUTO: ABNORMAL /LPF
IMM GRANULOCYTES # BLD AUTO: 0.03 10*3/MM3 (ref 0–0.05)
IMM GRANULOCYTES NFR BLD AUTO: 0.4 % (ref 0–0.5)
KETONES UR QL STRIP: NEGATIVE
LEUKOCYTE ESTERASE UR QL STRIP.AUTO: ABNORMAL
LYMPHOCYTES # BLD AUTO: 1.27 10*3/MM3 (ref 0.7–3.1)
LYMPHOCYTES NFR BLD AUTO: 15.9 % (ref 19.6–45.3)
MAGNESIUM SERPL-MCNC: 2.3 MG/DL (ref 1.6–2.4)
MCH RBC QN AUTO: 32.1 PG (ref 26.6–33)
MCHC RBC AUTO-ENTMCNC: 34.2 G/DL (ref 31.5–35.7)
MCV RBC AUTO: 93.9 FL (ref 79–97)
MONOCYTES # BLD AUTO: 0.77 10*3/MM3 (ref 0.1–0.9)
MONOCYTES NFR BLD AUTO: 9.6 % (ref 5–12)
NEUTROPHILS # BLD AUTO: 5.67 10*3/MM3 (ref 1.7–7)
NEUTROPHILS NFR BLD AUTO: 70.8 % (ref 42.7–76)
NITRITE UR QL STRIP: POSITIVE
NRBC BLD AUTO-RTO: 0 /100 WBC (ref 0–0.2)
PH UR STRIP.AUTO: 8 [PH] (ref 5–8)
PLATELET # BLD AUTO: 274 10*3/MM3 (ref 140–450)
PMV BLD AUTO: 10.5 FL (ref 6–12)
POTASSIUM BLD-SCNC: 3.1 MMOL/L (ref 3.5–5.2)
PROT UR QL STRIP: ABNORMAL
RBC # BLD AUTO: 4.27 10*6/MM3 (ref 3.77–5.28)
RBC # UR: ABNORMAL /HPF
REF LAB TEST METHOD: ABNORMAL
RH BLD: POSITIVE
SODIUM BLD-SCNC: 135 MMOL/L (ref 136–145)
SP GR UR STRIP: 1.01 (ref 1–1.03)
SQUAMOUS #/AREA URNS HPF: ABNORMAL /HPF
T&S EXPIRATION DATE: NORMAL
UROBILINOGEN UR QL STRIP: ABNORMAL
WBC NRBC COR # BLD: 8 10*3/MM3 (ref 3.4–10.8)
WBC UR QL AUTO: ABNORMAL /HPF

## 2020-01-03 PROCEDURE — 80048 BASIC METABOLIC PNL TOTAL CA: CPT | Performed by: OBSTETRICS & GYNECOLOGY

## 2020-01-03 PROCEDURE — 87086 URINE CULTURE/COLONY COUNT: CPT | Performed by: OBSTETRICS & GYNECOLOGY

## 2020-01-03 PROCEDURE — 86850 RBC ANTIBODY SCREEN: CPT | Performed by: OBSTETRICS & GYNECOLOGY

## 2020-01-03 PROCEDURE — 87186 SC STD MICRODIL/AGAR DIL: CPT | Performed by: OBSTETRICS & GYNECOLOGY

## 2020-01-03 PROCEDURE — 81001 URINALYSIS AUTO W/SCOPE: CPT | Performed by: OBSTETRICS & GYNECOLOGY

## 2020-01-03 PROCEDURE — 86900 BLOOD TYPING SEROLOGIC ABO: CPT | Performed by: OBSTETRICS & GYNECOLOGY

## 2020-01-03 PROCEDURE — 71046 X-RAY EXAM CHEST 2 VIEWS: CPT

## 2020-01-03 PROCEDURE — 83735 ASSAY OF MAGNESIUM: CPT | Performed by: OBSTETRICS & GYNECOLOGY

## 2020-01-03 PROCEDURE — 86901 BLOOD TYPING SEROLOGIC RH(D): CPT | Performed by: OBSTETRICS & GYNECOLOGY

## 2020-01-03 PROCEDURE — 93010 ELECTROCARDIOGRAM REPORT: CPT | Performed by: INTERNAL MEDICINE

## 2020-01-03 PROCEDURE — 93005 ELECTROCARDIOGRAM TRACING: CPT

## 2020-01-03 PROCEDURE — 85025 COMPLETE CBC W/AUTO DIFF WBC: CPT | Performed by: OBSTETRICS & GYNECOLOGY

## 2020-01-03 PROCEDURE — 36415 COLL VENOUS BLD VENIPUNCTURE: CPT

## 2020-01-03 PROCEDURE — 87077 CULTURE AEROBIC IDENTIFY: CPT | Performed by: OBSTETRICS & GYNECOLOGY

## 2020-01-03 NOTE — DISCHARGE INSTRUCTIONS
Take the following medications the morning of surgery: NONE    ARRIVE AT 1230    General Instructions:  • Do not eat solid food after midnight the night before surgery.  • You may drink clear liquids day of surgery but must stop at least one hour before your hospital arrival time.  • It is beneficial for you to have a clear drink that contains carbohydrates the day of surgery.  We suggest a 12 to 20 ounce bottle of Gatorade or Powerade for non-diabetic patients or a 12 to 20 ounce bottle of G2 or Powerade Zero for diabetic patients. (Pediatric patients, are not advised to drink a 12 to 20 ounce carbohydrate drink)    Clear liquids are liquids you can see through.  Nothing red in color.     Plain water                               Sports drinks  Sodas                                   Gelatin (Jell-O)  Fruit juices without pulp such as white grape juice and apple juice  Popsicles that contain no fruit or yogurt  Tea or coffee (no cream or milk added)  Gatorade / Powerade  G2 / Powerade Zero    • Infants may have breast milk up to four hours before surgery.  • Infants drinking formula may drink formula up to six hours before surgery.   • Patients who avoid smoking, chewing tobacco and alcohol for 4 weeks prior to surgery have a reduced risk of post-operative complications.  Quit smoking as many days before surgery as you can.  • Do not smoke, use chewing tobacco or drink alcohol the day of surgery.   • If applicable bring your C-PAP/ BI-PAP machine.  • Bring any papers given to you in the doctor’s office.  • Wear clean comfortable clothes.  • Do not wear contact lenses, false eyelashes or make-up.  Bring a case for your glasses.   • Bring crutches or walker if applicable.  • Remove all piercings.  Leave jewelry and any other valuables at home.  • Hair extensions with metal clips must be removed prior to surgery.  • The Pre-Admission Testing nurse will instruct you to bring medications if unable to obtain an accurate  list in Pre-Admission Testing.            Preventing a Surgical Site Infection:  • For 2 to 3 days before surgery, avoid shaving with a razor because the razor can irritate skin and make it easier to develop an infection.    • Any areas of open skin can increase the risk of a post-operative wound infection by allowing bacteria to enter and travel throughout the body.  Notify your surgeon if you have any skin wounds / rashes even if it is not near the expected surgical site.  The area will need assessed to determine if surgery should be delayed until it is healed.  • The night prior to surgery sleep in a clean bed with clean clothing.  Do not allow pets to sleep with you.  • Shower on the morning of surgery using a fresh bar of anti-bacterial soap (such as Dial) and clean washcloth.  Dry with a clean towel and dress in clean clothing.  • Ask your surgeon if you will be receiving antibiotics prior to surgery.  • Make sure you, your family, and all healthcare providers clean their hands with soap and water or an alcohol based hand  before caring for you or your wound.    Day of surgery:  Your arrival time is approximately two hours before your scheduled surgery time.  Upon arrival, a Pre-op nurse and Anesthesiologist will review your health history, obtain vital signs, and answer questions you may have.  The only belongings needed at this time will be a list of your home medications and if applicable your C-PAP/BI-PAP machine.  If you are staying overnight your family can leave the rest of your belongings in the car and bring them to your room later.  A Pre-op nurse will start an IV and you may receive medication in preparation for surgery, including something to help you relax.  Your family will be able to see you in the Pre-op area.  Two visitors at a time will be allowed in the Pre-op room.  While you are in surgery your family should notify the waiting room  if they leave the waiting room area  and provide a contact phone number.    Please be aware that surgery does come with discomfort.  We want to make every effort to control your discomfort so please discuss any uncontrolled symptoms with your nurse.   Your doctor will most likely have prescribed pain medications.      If you are going home after surgery you will receive individualized written care instructions before being discharged.  A responsible adult must drive you to and from the hospital on the day of your surgery and stay with you for 24 hours.    If you are staying overnight following surgery, you will be transported to your hospital room following the recovery period.  [facility] has all private rooms.    If you have any questions please call Pre-Admission Testing at [phone].  Deductibles and co-payments are collected on the day of service. Please be prepared to pay the required co-pay, deductible or deposit on the day of service as defined by your plan.

## 2020-01-05 LAB — BACTERIA SPEC AEROBE CULT: ABNORMAL

## 2020-01-07 ENCOUNTER — ANESTHESIA EVENT (OUTPATIENT)
Dept: PERIOP | Facility: HOSPITAL | Age: 75
End: 2020-01-07

## 2020-01-07 ENCOUNTER — ANESTHESIA (OUTPATIENT)
Dept: PERIOP | Facility: HOSPITAL | Age: 75
End: 2020-01-07

## 2020-01-07 ENCOUNTER — HOSPITAL ENCOUNTER (OUTPATIENT)
Facility: HOSPITAL | Age: 75
Discharge: HOME | End: 2020-01-08
Attending: OBSTETRICS & GYNECOLOGY | Admitting: OBSTETRICS & GYNECOLOGY

## 2020-01-07 DIAGNOSIS — N81.4 UTERINE PROLAPSE: ICD-10-CM

## 2020-01-07 PROBLEM — N81.2 UTEROVAGINAL PROLAPSE, INCOMPLETE: Status: ACTIVE | Noted: 2020-01-07

## 2020-01-07 PROBLEM — N81.12 CYSTOCELE, LATERAL: Status: ACTIVE | Noted: 2020-01-07

## 2020-01-07 PROBLEM — N81.89 LOSS OF PERINEAL BODY, FEMALE: Status: ACTIVE | Noted: 2020-01-07

## 2020-01-07 PROBLEM — N81.83 INCOMPETENCE OR WEAKENING OF RECTOVAGINAL TISSUE: Status: ACTIVE | Noted: 2020-01-07

## 2020-01-07 PROBLEM — Z87.42 PERSONAL HISTORY OF ENDOMETRIOSIS: Status: ACTIVE | Noted: 2020-01-07

## 2020-01-07 PROBLEM — Z84.2 FAMILY HISTORY OF ENDOMETRIOSIS IN FIRST DEGREE RELATIVE: Status: ACTIVE | Noted: 2020-01-07

## 2020-01-07 PROBLEM — N81.5 VAGINAL ENTEROCELE, CONGENITAL OR ACQUIRED: Status: ACTIVE | Noted: 2020-01-07

## 2020-01-07 PROBLEM — R15.0 INCOMPLETE DEFECATION: Status: ACTIVE | Noted: 2020-01-07

## 2020-01-07 PROBLEM — N81.82 INCOMPETENCE OR WEAKENING OF PUBOCERVICAL TISSUE: Status: ACTIVE | Noted: 2020-01-07

## 2020-01-07 PROBLEM — N81.11 CYSTOCELE, MIDLINE: Status: ACTIVE | Noted: 2020-01-07

## 2020-01-07 PROBLEM — N39.3 FEMALE STRESS INCONTINENCE: Status: ACTIVE | Noted: 2020-01-07

## 2020-01-07 LAB
ABO GROUP BLD: NORMAL
BLD GP AB SCN SERPL QL: NEGATIVE
RH BLD: POSITIVE
T&S EXPIRATION DATE: NORMAL

## 2020-01-07 PROCEDURE — A9270 NON-COVERED ITEM OR SERVICE: HCPCS | Performed by: OBSTETRICS & GYNECOLOGY

## 2020-01-07 PROCEDURE — 88305 TISSUE EXAM BY PATHOLOGIST: CPT | Performed by: OBSTETRICS & GYNECOLOGY

## 2020-01-07 PROCEDURE — G0378 HOSPITAL OBSERVATION PER HR: HCPCS

## 2020-01-07 PROCEDURE — 25010000002 DEXAMETHASONE PER 1 MG: Performed by: NURSE ANESTHETIST, CERTIFIED REGISTERED

## 2020-01-07 PROCEDURE — 63710000001 ESTRADIOL 0.1 MG/GM CREAM 42.5 G TUBE: Performed by: OBSTETRICS & GYNECOLOGY

## 2020-01-07 PROCEDURE — 25010000002 PHENYLEPHRINE PER 1 ML: Performed by: NURSE ANESTHETIST, CERTIFIED REGISTERED

## 2020-01-07 PROCEDURE — 25010000002 LEVOFLOXACIN PER 250 MG: Performed by: OBSTETRICS & GYNECOLOGY

## 2020-01-07 PROCEDURE — 86900 BLOOD TYPING SEROLOGIC ABO: CPT | Performed by: OBSTETRICS & GYNECOLOGY

## 2020-01-07 PROCEDURE — 86850 RBC ANTIBODY SCREEN: CPT | Performed by: OBSTETRICS & GYNECOLOGY

## 2020-01-07 PROCEDURE — 25010000002 NEOSTIGMINE PER 0.5 MG: Performed by: ANESTHESIOLOGY

## 2020-01-07 PROCEDURE — 86901 BLOOD TYPING SEROLOGIC RH(D): CPT | Performed by: OBSTETRICS & GYNECOLOGY

## 2020-01-07 PROCEDURE — 25010000002 ONDANSETRON PER 1 MG: Performed by: ANESTHESIOLOGY

## 2020-01-07 PROCEDURE — 63710000001 PHENAZOPYRIDINE 200 MG TABLET: Performed by: OBSTETRICS & GYNECOLOGY

## 2020-01-07 PROCEDURE — 25010000002 PROPOFOL 10 MG/ML EMULSION: Performed by: NURSE ANESTHETIST, CERTIFIED REGISTERED

## 2020-01-07 PROCEDURE — 25010000002 FENTANYL CITRATE (PF) 100 MCG/2ML SOLUTION: Performed by: NURSE ANESTHETIST, CERTIFIED REGISTERED

## 2020-01-07 PROCEDURE — 25010000002 MIDAZOLAM PER 1 MG: Performed by: ANESTHESIOLOGY

## 2020-01-07 DEVICE — GRFT TISS STRATTICE FIRM 6X10CM: Type: IMPLANTABLE DEVICE | Site: VAGINA | Status: FUNCTIONAL

## 2020-01-07 RX ORDER — EPHEDRINE SULFATE 50 MG/ML
5 INJECTION, SOLUTION INTRAVENOUS ONCE AS NEEDED
Status: DISCONTINUED | OUTPATIENT
Start: 2020-01-07 | End: 2020-01-07 | Stop reason: HOSPADM

## 2020-01-07 RX ORDER — LABETALOL HYDROCHLORIDE 5 MG/ML
5 INJECTION, SOLUTION INTRAVENOUS
Status: DISCONTINUED | OUTPATIENT
Start: 2020-01-07 | End: 2020-01-07 | Stop reason: HOSPADM

## 2020-01-07 RX ORDER — MIDAZOLAM HYDROCHLORIDE 1 MG/ML
0.5 INJECTION INTRAMUSCULAR; INTRAVENOUS
Status: COMPLETED | OUTPATIENT
Start: 2020-01-07 | End: 2020-01-07

## 2020-01-07 RX ORDER — ROSUVASTATIN CALCIUM 5 MG/1
5 TABLET, COATED ORAL DAILY
Status: DISCONTINUED | OUTPATIENT
Start: 2020-01-08 | End: 2020-01-08 | Stop reason: HOSPADM

## 2020-01-07 RX ORDER — PROMETHAZINE HYDROCHLORIDE 25 MG/1
25 TABLET ORAL ONCE AS NEEDED
Status: DISCONTINUED | OUTPATIENT
Start: 2020-01-07 | End: 2020-01-07 | Stop reason: HOSPADM

## 2020-01-07 RX ORDER — ASPIRIN 81 MG/1
81 TABLET ORAL DAILY
Status: DISCONTINUED | OUTPATIENT
Start: 2020-01-08 | End: 2020-01-08 | Stop reason: HOSPADM

## 2020-01-07 RX ORDER — PROPOFOL 10 MG/ML
VIAL (ML) INTRAVENOUS AS NEEDED
Status: DISCONTINUED | OUTPATIENT
Start: 2020-01-07 | End: 2020-01-07 | Stop reason: SURG

## 2020-01-07 RX ORDER — PROMETHAZINE HYDROCHLORIDE 25 MG/1
25 SUPPOSITORY RECTAL ONCE AS NEEDED
Status: DISCONTINUED | OUTPATIENT
Start: 2020-01-07 | End: 2020-01-07 | Stop reason: HOSPADM

## 2020-01-07 RX ORDER — FLUMAZENIL 0.1 MG/ML
0.2 INJECTION INTRAVENOUS AS NEEDED
Status: DISCONTINUED | OUTPATIENT
Start: 2020-01-07 | End: 2020-01-07 | Stop reason: HOSPADM

## 2020-01-07 RX ORDER — OXYCODONE AND ACETAMINOPHEN 10; 325 MG/1; MG/1
1 TABLET ORAL EVERY 4 HOURS PRN
Status: DISCONTINUED | OUTPATIENT
Start: 2020-01-07 | End: 2020-01-08

## 2020-01-07 RX ORDER — ESTRADIOL 0.1 MG/G
CREAM VAGINAL AS NEEDED
Status: DISCONTINUED | OUTPATIENT
Start: 2020-01-07 | End: 2020-01-07 | Stop reason: HOSPADM

## 2020-01-07 RX ORDER — FENTANYL CITRATE 50 UG/ML
50 INJECTION, SOLUTION INTRAMUSCULAR; INTRAVENOUS
Status: DISCONTINUED | OUTPATIENT
Start: 2020-01-07 | End: 2020-01-07 | Stop reason: HOSPADM

## 2020-01-07 RX ORDER — ONDANSETRON 2 MG/ML
4 INJECTION INTRAMUSCULAR; INTRAVENOUS ONCE AS NEEDED
Status: DISCONTINUED | OUTPATIENT
Start: 2020-01-07 | End: 2020-01-07 | Stop reason: HOSPADM

## 2020-01-07 RX ORDER — SODIUM CHLORIDE 0.9 % (FLUSH) 0.9 %
10 SYRINGE (ML) INJECTION AS NEEDED
Status: DISCONTINUED | OUTPATIENT
Start: 2020-01-07 | End: 2020-01-07 | Stop reason: HOSPADM

## 2020-01-07 RX ORDER — EPHEDRINE SULFATE 50 MG/ML
INJECTION, SOLUTION INTRAVENOUS AS NEEDED
Status: DISCONTINUED | OUTPATIENT
Start: 2020-01-07 | End: 2020-01-07 | Stop reason: SURG

## 2020-01-07 RX ORDER — MORPHINE SULFATE 2 MG/ML
1 INJECTION, SOLUTION INTRAMUSCULAR; INTRAVENOUS
Status: DISCONTINUED | OUTPATIENT
Start: 2020-01-07 | End: 2020-01-08 | Stop reason: HOSPADM

## 2020-01-07 RX ORDER — DEXAMETHASONE SODIUM PHOSPHATE 10 MG/ML
INJECTION INTRAMUSCULAR; INTRAVENOUS AS NEEDED
Status: DISCONTINUED | OUTPATIENT
Start: 2020-01-07 | End: 2020-01-07 | Stop reason: SURG

## 2020-01-07 RX ORDER — HYDROCODONE BITARTRATE AND ACETAMINOPHEN 7.5; 325 MG/1; MG/1
1 TABLET ORAL ONCE AS NEEDED
Status: DISCONTINUED | OUTPATIENT
Start: 2020-01-07 | End: 2020-01-07 | Stop reason: HOSPADM

## 2020-01-07 RX ORDER — ACETAMINOPHEN 325 MG/1
650 TABLET ORAL ONCE AS NEEDED
Status: DISCONTINUED | OUTPATIENT
Start: 2020-01-07 | End: 2020-01-07 | Stop reason: HOSPADM

## 2020-01-07 RX ORDER — GLYCOPYRROLATE 0.2 MG/ML
INJECTION INTRAMUSCULAR; INTRAVENOUS AS NEEDED
Status: DISCONTINUED | OUTPATIENT
Start: 2020-01-07 | End: 2020-01-07 | Stop reason: SURG

## 2020-01-07 RX ORDER — PROMETHAZINE HYDROCHLORIDE 25 MG/ML
12.5 INJECTION, SOLUTION INTRAMUSCULAR; INTRAVENOUS EVERY 6 HOURS PRN
Status: DISCONTINUED | OUTPATIENT
Start: 2020-01-07 | End: 2020-01-08 | Stop reason: HOSPADM

## 2020-01-07 RX ORDER — ONDANSETRON 2 MG/ML
INJECTION INTRAMUSCULAR; INTRAVENOUS AS NEEDED
Status: DISCONTINUED | OUTPATIENT
Start: 2020-01-07 | End: 2020-01-07 | Stop reason: SURG

## 2020-01-07 RX ORDER — PROMETHAZINE HYDROCHLORIDE 25 MG/ML
6.25 INJECTION, SOLUTION INTRAMUSCULAR; INTRAVENOUS
Status: DISCONTINUED | OUTPATIENT
Start: 2020-01-07 | End: 2020-01-07 | Stop reason: HOSPADM

## 2020-01-07 RX ORDER — CLINDAMYCIN PHOSPHATE 600 MG/50ML
600 INJECTION INTRAVENOUS EVERY 8 HOURS
Status: COMPLETED | OUTPATIENT
Start: 2020-01-08 | End: 2020-01-08

## 2020-01-07 RX ORDER — OXYCODONE HYDROCHLORIDE AND ACETAMINOPHEN 5; 325 MG/1; MG/1
1 TABLET ORAL EVERY 4 HOURS PRN
Status: DISCONTINUED | OUTPATIENT
Start: 2020-01-07 | End: 2020-01-08

## 2020-01-07 RX ORDER — CLINDAMYCIN PHOSPHATE 900 MG/50ML
900 INJECTION INTRAVENOUS ONCE
Status: COMPLETED | OUTPATIENT
Start: 2020-01-07 | End: 2020-01-07

## 2020-01-07 RX ORDER — FAMOTIDINE 10 MG/ML
20 INJECTION, SOLUTION INTRAVENOUS ONCE
Status: COMPLETED | OUTPATIENT
Start: 2020-01-07 | End: 2020-01-07

## 2020-01-07 RX ORDER — MORPHINE SULFATE 2 MG/ML
2 INJECTION, SOLUTION INTRAMUSCULAR; INTRAVENOUS
Status: DISCONTINUED | OUTPATIENT
Start: 2020-01-07 | End: 2020-01-08 | Stop reason: HOSPADM

## 2020-01-07 RX ORDER — BUPIVACAINE HYDROCHLORIDE AND EPINEPHRINE 2.5; 5 MG/ML; UG/ML
INJECTION, SOLUTION INFILTRATION; PERINEURAL AS NEEDED
Status: DISCONTINUED | OUTPATIENT
Start: 2020-01-07 | End: 2020-01-07 | Stop reason: HOSPADM

## 2020-01-07 RX ORDER — DIPHENHYDRAMINE HCL 25 MG
25 CAPSULE ORAL
Status: DISCONTINUED | OUTPATIENT
Start: 2020-01-07 | End: 2020-01-07 | Stop reason: HOSPADM

## 2020-01-07 RX ORDER — NALOXONE HCL 0.4 MG/ML
0.4 VIAL (ML) INJECTION
Status: DISCONTINUED | OUTPATIENT
Start: 2020-01-07 | End: 2020-01-08 | Stop reason: HOSPADM

## 2020-01-07 RX ORDER — ROCURONIUM BROMIDE 10 MG/ML
INJECTION, SOLUTION INTRAVENOUS AS NEEDED
Status: DISCONTINUED | OUTPATIENT
Start: 2020-01-07 | End: 2020-01-07 | Stop reason: SURG

## 2020-01-07 RX ORDER — SODIUM CHLORIDE, SODIUM LACTATE, POTASSIUM CHLORIDE, CALCIUM CHLORIDE 600; 310; 30; 20 MG/100ML; MG/100ML; MG/100ML; MG/100ML
9 INJECTION, SOLUTION INTRAVENOUS CONTINUOUS
Status: DISCONTINUED | OUTPATIENT
Start: 2020-01-07 | End: 2020-01-08 | Stop reason: HOSPADM

## 2020-01-07 RX ORDER — HYDROMORPHONE HYDROCHLORIDE 1 MG/ML
0.5 INJECTION, SOLUTION INTRAMUSCULAR; INTRAVENOUS; SUBCUTANEOUS
Status: DISCONTINUED | OUTPATIENT
Start: 2020-01-07 | End: 2020-01-07 | Stop reason: HOSPADM

## 2020-01-07 RX ORDER — FENTANYL CITRATE 50 UG/ML
INJECTION, SOLUTION INTRAMUSCULAR; INTRAVENOUS AS NEEDED
Status: DISCONTINUED | OUTPATIENT
Start: 2020-01-07 | End: 2020-01-07 | Stop reason: SURG

## 2020-01-07 RX ORDER — PHENAZOPYRIDINE HYDROCHLORIDE 200 MG/1
200 TABLET, FILM COATED ORAL ONCE
Status: COMPLETED | OUTPATIENT
Start: 2020-01-07 | End: 2020-01-07

## 2020-01-07 RX ORDER — MAGNESIUM HYDROXIDE 1200 MG/15ML
LIQUID ORAL AS NEEDED
Status: DISCONTINUED | OUTPATIENT
Start: 2020-01-07 | End: 2020-01-07 | Stop reason: HOSPADM

## 2020-01-07 RX ORDER — SODIUM CHLORIDE 0.9 % (FLUSH) 0.9 %
3-10 SYRINGE (ML) INJECTION AS NEEDED
Status: DISCONTINUED | OUTPATIENT
Start: 2020-01-07 | End: 2020-01-07 | Stop reason: HOSPADM

## 2020-01-07 RX ORDER — HYDROCHLOROTHIAZIDE 25 MG/1
25 TABLET ORAL DAILY
Status: DISCONTINUED | OUTPATIENT
Start: 2020-01-08 | End: 2020-01-08 | Stop reason: HOSPADM

## 2020-01-07 RX ORDER — ONDANSETRON 4 MG/1
4 TABLET, FILM COATED ORAL EVERY 6 HOURS PRN
Status: DISCONTINUED | OUTPATIENT
Start: 2020-01-07 | End: 2020-01-08 | Stop reason: HOSPADM

## 2020-01-07 RX ORDER — NALOXONE HCL 0.4 MG/ML
0.2 VIAL (ML) INJECTION AS NEEDED
Status: DISCONTINUED | OUTPATIENT
Start: 2020-01-07 | End: 2020-01-07 | Stop reason: HOSPADM

## 2020-01-07 RX ORDER — LIDOCAINE HYDROCHLORIDE 10 MG/ML
0.5 INJECTION, SOLUTION EPIDURAL; INFILTRATION; INTRACAUDAL; PERINEURAL ONCE AS NEEDED
Status: DISCONTINUED | OUTPATIENT
Start: 2020-01-07 | End: 2020-01-07 | Stop reason: HOSPADM

## 2020-01-07 RX ORDER — SODIUM CHLORIDE 9 MG/ML
INJECTION, SOLUTION INTRAVENOUS AS NEEDED
Status: DISCONTINUED | OUTPATIENT
Start: 2020-01-07 | End: 2020-01-07 | Stop reason: HOSPADM

## 2020-01-07 RX ORDER — ONDANSETRON 2 MG/ML
4 INJECTION INTRAMUSCULAR; INTRAVENOUS EVERY 6 HOURS PRN
Status: DISCONTINUED | OUTPATIENT
Start: 2020-01-07 | End: 2020-01-08 | Stop reason: HOSPADM

## 2020-01-07 RX ORDER — PROMETHAZINE HYDROCHLORIDE 25 MG/ML
12.5 INJECTION, SOLUTION INTRAMUSCULAR; INTRAVENOUS ONCE AS NEEDED
Status: DISCONTINUED | OUTPATIENT
Start: 2020-01-07 | End: 2020-01-07 | Stop reason: HOSPADM

## 2020-01-07 RX ORDER — LEVOFLOXACIN 5 MG/ML
500 INJECTION, SOLUTION INTRAVENOUS ONCE
Status: COMPLETED | OUTPATIENT
Start: 2020-01-07 | End: 2020-01-07

## 2020-01-07 RX ORDER — LIDOCAINE HYDROCHLORIDE 40 MG/ML
SOLUTION TOPICAL AS NEEDED
Status: DISCONTINUED | OUTPATIENT
Start: 2020-01-07 | End: 2020-01-07 | Stop reason: SURG

## 2020-01-07 RX ORDER — UREA 10 %
1 LOTION (ML) TOPICAL NIGHTLY PRN
Status: DISCONTINUED | OUTPATIENT
Start: 2020-01-07 | End: 2020-01-08 | Stop reason: HOSPADM

## 2020-01-07 RX ORDER — DIPHENHYDRAMINE HYDROCHLORIDE 50 MG/ML
12.5 INJECTION INTRAMUSCULAR; INTRAVENOUS
Status: DISCONTINUED | OUTPATIENT
Start: 2020-01-07 | End: 2020-01-07 | Stop reason: HOSPADM

## 2020-01-07 RX ORDER — DOCUSATE SODIUM 100 MG/1
100 CAPSULE, LIQUID FILLED ORAL 2 TIMES DAILY
Status: DISCONTINUED | OUTPATIENT
Start: 2020-01-08 | End: 2020-01-08 | Stop reason: HOSPADM

## 2020-01-07 RX ORDER — DEXTROSE AND SODIUM CHLORIDE 5; .45 G/100ML; G/100ML
100 INJECTION, SOLUTION INTRAVENOUS CONTINUOUS
Status: DISCONTINUED | OUTPATIENT
Start: 2020-01-08 | End: 2020-01-08 | Stop reason: HOSPADM

## 2020-01-07 RX ORDER — OXYCODONE AND ACETAMINOPHEN 7.5; 325 MG/1; MG/1
1 TABLET ORAL ONCE AS NEEDED
Status: DISCONTINUED | OUTPATIENT
Start: 2020-01-07 | End: 2020-01-07 | Stop reason: HOSPADM

## 2020-01-07 RX ORDER — SODIUM CHLORIDE 0.9 % (FLUSH) 0.9 %
3 SYRINGE (ML) INJECTION EVERY 12 HOURS SCHEDULED
Status: DISCONTINUED | OUTPATIENT
Start: 2020-01-07 | End: 2020-01-07 | Stop reason: HOSPADM

## 2020-01-07 RX ORDER — IBUPROFEN 400 MG/1
400 TABLET ORAL EVERY 6 HOURS SCHEDULED
Status: DISCONTINUED | OUTPATIENT
Start: 2020-01-08 | End: 2020-01-08 | Stop reason: HOSPADM

## 2020-01-07 RX ORDER — LIDOCAINE HYDROCHLORIDE 20 MG/ML
INJECTION, SOLUTION INFILTRATION; PERINEURAL AS NEEDED
Status: DISCONTINUED | OUTPATIENT
Start: 2020-01-07 | End: 2020-01-07 | Stop reason: SURG

## 2020-01-07 RX ORDER — HYDRALAZINE HYDROCHLORIDE 20 MG/ML
5 INJECTION INTRAMUSCULAR; INTRAVENOUS
Status: DISCONTINUED | OUTPATIENT
Start: 2020-01-07 | End: 2020-01-07 | Stop reason: HOSPADM

## 2020-01-07 RX ADMIN — SODIUM CHLORIDE, POTASSIUM CHLORIDE, SODIUM LACTATE AND CALCIUM CHLORIDE: 600; 310; 30; 20 INJECTION, SOLUTION INTRAVENOUS at 13:25

## 2020-01-07 RX ADMIN — EPHEDRINE SULFATE 10 MG: 50 INJECTION INTRAVENOUS at 13:23

## 2020-01-07 RX ADMIN — FAMOTIDINE 20 MG: 10 INJECTION INTRAVENOUS at 12:27

## 2020-01-07 RX ADMIN — FENTANYL CITRATE 50 MCG: 50 INJECTION INTRAMUSCULAR; INTRAVENOUS at 15:07

## 2020-01-07 RX ADMIN — EPHEDRINE SULFATE 10 MG: 50 INJECTION INTRAVENOUS at 19:02

## 2020-01-07 RX ADMIN — ROCURONIUM BROMIDE 20 MG: 10 INJECTION INTRAVENOUS at 15:13

## 2020-01-07 RX ADMIN — ONDANSETRON HYDROCHLORIDE 4 MG: 2 SOLUTION INTRAMUSCULAR; INTRAVENOUS at 19:06

## 2020-01-07 RX ADMIN — EPHEDRINE SULFATE 10 MG: 50 INJECTION INTRAVENOUS at 13:42

## 2020-01-07 RX ADMIN — PHENAZOPYRIDINE 200 MG: 200 TABLET ORAL at 11:30

## 2020-01-07 RX ADMIN — PROPOFOL 30 MG: 10 INJECTION, EMULSION INTRAVENOUS at 19:03

## 2020-01-07 RX ADMIN — DEXAMETHASONE SODIUM PHOSPHATE 8 MG: 10 INJECTION INTRAMUSCULAR; INTRAVENOUS at 13:31

## 2020-01-07 RX ADMIN — PHENYLEPHRINE HYDROCHLORIDE 200 MCG: 10 INJECTION INTRAVENOUS at 13:57

## 2020-01-07 RX ADMIN — LEVOFLOXACIN 500 MG: 5 INJECTION, SOLUTION INTRAVENOUS at 12:23

## 2020-01-07 RX ADMIN — LIDOCAINE HYDROCHLORIDE 100 MG: 20 INJECTION, SOLUTION INFILTRATION; PERINEURAL at 13:03

## 2020-01-07 RX ADMIN — CLINDAMYCIN PHOSPHATE 900 MG: 18 INJECTION, SOLUTION INTRAMUSCULAR; INTRAVENOUS at 13:12

## 2020-01-07 RX ADMIN — ROCURONIUM BROMIDE 40 MG: 10 INJECTION INTRAVENOUS at 13:05

## 2020-01-07 RX ADMIN — PROPOFOL 150 MG: 10 INJECTION, EMULSION INTRAVENOUS at 13:05

## 2020-01-07 RX ADMIN — GLYCOPYRROLATE 0.3 MG: 0.2 INJECTION INTRAMUSCULAR; INTRAVENOUS at 19:11

## 2020-01-07 RX ADMIN — CLINDAMYCIN PHOSPHATE 900 MG: 18 INJECTION, SOLUTION INTRAMUSCULAR; INTRAVENOUS at 19:20

## 2020-01-07 RX ADMIN — ROCURONIUM BROMIDE 20 MG: 10 INJECTION INTRAVENOUS at 14:02

## 2020-01-07 RX ADMIN — PHENYLEPHRINE HYDROCHLORIDE 100 MCG: 10 INJECTION INTRAVENOUS at 18:59

## 2020-01-07 RX ADMIN — MIDAZOLAM 0.5 MG: 1 INJECTION INTRAMUSCULAR; INTRAVENOUS at 12:28

## 2020-01-07 RX ADMIN — FENTANYL CITRATE 100 MCG: 50 INJECTION INTRAMUSCULAR; INTRAVENOUS at 13:03

## 2020-01-07 RX ADMIN — LIDOCAINE HYDROCHLORIDE 1 EACH: 40 SOLUTION TOPICAL at 13:10

## 2020-01-07 RX ADMIN — NEOSTIGMINE METHYLSULFATE 2.5 MG: 1 INJECTION INTRAMUSCULAR; INTRAVENOUS; SUBCUTANEOUS at 19:11

## 2020-01-07 RX ADMIN — SODIUM CHLORIDE, POTASSIUM CHLORIDE, SODIUM LACTATE AND CALCIUM CHLORIDE 9 ML/HR: 600; 310; 30; 20 INJECTION, SOLUTION INTRAVENOUS at 10:10

## 2020-01-07 NOTE — ANESTHESIA PROCEDURE NOTES
Airway  Urgency: elective    Date/Time: 1/7/2020 1:10 PM  Airway not difficult    General Information and Staff    Patient location during procedure: OR  Anesthesiologist: Corinan Desai MD  CRNA: Frank Srivastava CRNA    Indications and Patient Condition  Indications for airway management: airway protection    Preoxygenated: yes  Mask difficulty assessment: 1 - vent by mask    Final Airway Details  Final airway type: endotracheal airway      Successful airway: ETT  Cuffed: yes   Successful intubation technique: direct laryngoscopy  Endotracheal tube insertion site: oral  Blade: Fuentes  Blade size: 2  ETT size (mm): 7.0  Cormack-Lehane Classification: grade I - full view of glottis  Placement verified by: chest auscultation and capnometry   Measured from: lips  ETT/EBT  to lips (cm): 22  Number of attempts at approach: 1  Assessment: lips, teeth, and gum same as pre-op and atraumatic intubation    Additional Comments  Pre 02 100%, SIVI, DL x1, atraumatic intubation, BLBS, Positive ETC02.

## 2020-01-07 NOTE — PLAN OF CARE
Laparoscopic uterosacral ligament colpopexy  Laparoscopic paravaginal repair  Total laparoscopic hysterectomy with bilateral salpingectomy and possible bilateral oophorectomy  Laparoscopic abdominal enterocele repair  Pubovaginal sling  Posterior colporrhaphy  Anterior colporrhaphy   Extraperitoneal colpopexy sacrospinous ligament fixation  Insertion of vaginal graft   Cystourethroscopy  And any other indicated procedures

## 2020-01-07 NOTE — H&P
Female Pelvic Medicine and Reconstructive Surgery   History & Physical    Patient Identification:  Name: Marcia Ulloa Age: 74 y.o. Sex: female :  1945 MRN: Female Pelvic Medicine and Reconstructive Surgery   History & Physical    Patient Identification:  Name: Marcia Ulloa Age: 74 y.o. Sex: female :  1945 MRN: [id number]                            Problem List:    Uterovaginal prolapse, incomplete    Cystocele, midline    Cystocele, lateral    Vaginal enterocele, congenital or acquired    Loss of perineal body, female    Female stress incontinence    Incomplete defecation    Incompetence or weakening of pubocervical tissue    Incompetence or weakening of rectovaginal tissue    Personal history of endometriosis    Past Medical History:  Past Medical History:   Diagnosis Date   • Breast cancer (CMS/HCC)    • Drug therapy     femara 5-7 years.   • Eczema    • History of shingles    • Hyperlipidemia    • Hypertension    • Knee pain    • Prolapse of female bladder, acquired    • Seasonal allergies    • Slow to wake up after anesthesia    • SOB (shortness of breath) on exertion    • Urinary incontinence      Past Surgical History:  Past Surgical History:   Procedure Laterality Date   • BREAST BIOPSY Right     malignant in 3 quadrents.   • BREAST SURGERY  2010    mastectomy   • MASTECTOMY Right    • TUBAL ABDOMINAL LIGATION        Home Meds:  Medications Prior to Admission   Medication Sig Dispense Refill Last Dose   • aspirin 81 MG EC tablet Take 81 mg by mouth Daily. HELD   Past Month at Unknown time   • Calcium Carbonate-Vitamin D (CALCIUM-CARB 600 + D PO) Take 1 tablet by mouth Daily.   Past Week at Unknown time   • CRANBERRY PO Take 1 tablet by mouth Daily.   Past Week at Unknown time   • hydrochlorothiazide (HYDRODIURIL) 25 MG tablet TAKE 1 TABLET BY MOUTH DAILY. 90 tablet 1 2020 at 0830   • melatonin 3 MG tablet Take 3 mg by mouth At Night As Needed for Sleep.   2019 at  Unknown time   • Multiple Vitamin (MULTI VITAMIN DAILY PO) Take 1 tablet by mouth Daily.   1/6/2020 at 1200   • rosuvastatin (CRESTOR) 5 MG tablet TAKE 1 TABLET BY MOUTH EVERY DAY (Patient taking differently: Take 5 mg by mouth Daily.) 90 tablet 0 1/6/2020 at 0830   • TURMERIC PO Take 1 tablet by mouth Daily. HELD   12/25/2019 at Unknown time     Current Meds:     Current Facility-Administered Medications:   •  levoFLOXacin (LEVAQUIN) 500 mg/100 mL D5W (premix) (LEVAQUIN) 500 mg, 500 mg, Intravenous, Once **AND** clindamycin (CLEOCIN) 900 mg in dextrose 5% 50 mL IVPB (premix), 900 mg, Intravenous, Once, Senia Frederick MD  •  phenazopyridine (PYRIDIUM) tablet 200 mg, 200 mg, Oral, Once, Senia Frederick MD  •  sodium chloride 0.9 % flush 10 mL, 10 mL, Intravenous, PRN, Senia Frederick MD  •  sodium chloride 0.9 % flush 3 mL, 3 mL, Intravenous, Q12H, Senia Frederick MD  Allergies:  Allergies   Allergen Reactions   • Penicillins Unknown (See Comments)     UNSURE-AS A CHILD     Immunizations:  Immunization History   Administered Date(s) Administered   • Flu Mist 10/01/2017   • Fluzone High Dose =>65 Years ([facility] ONLY) 10/01/2018   • Hepatitis A 04/12/2005, 10/13/2005   • Influenza TIV (IM) 10/01/2013   • Pneumococcal Conjugate 13-Valent (PCV13) 05/01/2017   • Pneumococcal Polysaccharide (PPSV23) 07/01/2018   • Td 04/25/2003   • Tdap 01/02/2019   • Typhoid, Unspecified 05/12/2005, 06/05/2007     Social History:   Social History     Tobacco Use   • Smoking status: Never Smoker   • Smokeless tobacco: Never Used   Substance Use Topics   • Alcohol use: Yes     Comment: 1/day      Family History:  Family History   Problem Relation Age of Onset   • COPD Mother    • Heart failure Mother    • Hypertension Mother    • Heart disease Mother    • Depression Mother    • Vision loss Mother    • Heart failure Father    • Hypertension Father    • Heart disease Father    • Vision loss Maternal Grandfather    • Malig Hyperthermia  "Neg Hx         Review of Systems  Constitutional:  Denies fever or chills   Eyes:  Denies change in visual acuity   HEENT:  Denies nasal congestion or sore throat   Respiratory:  Denies cough or shortness of breath   Cardiovascular:  Denies chest pain or edema   GI:  Denies abdominal pain, nausea, vomiting, bloody stools or diarrhea   :  Denies dysuria   Musculoskeletal:  Denies back pain or joint pain   Integument:  Denies rash   Neurologic:  Denies headache, focal weakness or sensory changes   Endocrine:  Denies polyuria or polydipsia   Lymphatic:  Denies swollen glands   Psychiatric:  Denies depression or anxiety       Objective:  tMax 24 hrs: Temp (24hrs), Av.9 °F (37.2 °C), Min:98.9 °F (37.2 °C), Max:98.9 °F (37.2 °C)    Vitals Ranges:   Temp:  [98.9 °F (37.2 °C)] 98.9 °F (37.2 °C)  Heart Rate:  [86] 86  Resp:  [18] 18  BP: (136)/(77) 136/77    Exam:  Vital Signs: /77 (BP Location: Left arm, Patient Position: Lying)   Pulse 86   Temp 98.9 °F (37.2 °C) (Oral)   Resp 18   Ht 170.2 cm (67\")   Wt 67.2 kg (148 lb 3.2 oz)   SpO2 95%   BMI 23.21 kg/m²   Constitutional:  Well developed, well nourished, no acute distress, non-toxic appearance    GI:  Soft, nondistended, normal bowel sounds, nontender, no organomegaly, no mass, no rebound, no guarding   :  No costovertebral angle tenderness   Musculoskeletal:  No edema, no tenderness, no deformities. Back- no tenderness  Integument:  Well hydrated, no rash   Lymphatic:  No lymphadenopathy noted   Neurologic:  Alert & oriented x 3,  Pelvic:     POPQ  +2  +2  -5    5  2  10   0  0  -7    Pap 10/21/2019    Positive cough stress test    Assessment:    Uterovaginal prolapse, incomplete    Cystocele, midline    Cystocele, lateral    Vaginal enterocele, congenital or acquired    Loss of perineal body, female    Female stress incontinence    Incomplete defecation    Incompetence or weakening of pubocervical tissue    Incompetence or weakening of rectovaginal " tissue    Personal history of endometriosis          Plan:  Laparoscopic uterosacral ligament colpopexy  Laparoscopic paravaginal repair  Total laparoscopic hysterectomy with bilateral salpingectomy and possible bilateral oophorectomy  Laparoscopic abdominal enterocele repair  Pubovaginal sling  Posterior colporrhaphy  Anterior colporrhaphy   Extraperitoneal colpopexy sacrospinous ligament fixation  Insertion of vaginal graft   Cystourethroscopy  And any other indicated procedures    1/6/2020    Senia Frederick MD  1/7/2020

## 2020-01-07 NOTE — ANESTHESIA PREPROCEDURE EVALUATION
Anesthesia Evaluation     Patient summary reviewed and Nursing notes reviewed   history of anesthetic complications: prolonged sedation  NPO Solid Status: > 8 hours  NPO Liquid Status: > 2 hours           Airway   Mallampati: II  TM distance: >3 FB  Neck ROM: full  No difficulty expected  Dental - normal exam     Pulmonary - normal exam    breath sounds clear to auscultation  (+) shortness of breath,   Cardiovascular - normal exam    Rhythm: regular  Rate: normal    (+) hypertension less than 2 medications, hyperlipidemia,       Neuro/Psych- negative ROS  GI/Hepatic/Renal/Endo - negative ROS     Musculoskeletal     Abdominal  - normal exam   Substance History - negative use     OB/GYN negative ob/gyn ROS         Other   arthritis,    history of cancer remission                    Anesthesia Plan    ASA 2     general     intravenous induction     Anesthetic plan, all risks, benefits, and alternatives have been provided, discussed and informed consent has been obtained with: patient.

## 2020-01-08 VITALS
SYSTOLIC BLOOD PRESSURE: 101 MMHG | HEART RATE: 82 BPM | WEIGHT: 148.2 LBS | RESPIRATION RATE: 16 BRPM | TEMPERATURE: 97.5 F | DIASTOLIC BLOOD PRESSURE: 58 MMHG | BODY MASS INDEX: 23.26 KG/M2 | HEIGHT: 67 IN | OXYGEN SATURATION: 95 %

## 2020-01-08 LAB
ALBUMIN SERPL-MCNC: 2.8 G/DL (ref 3.5–5.2)
ALBUMIN/GLOB SERPL: 0.9 G/DL
ALP SERPL-CCNC: 55 U/L (ref 39–117)
ALT SERPL W P-5'-P-CCNC: 11 U/L (ref 1–33)
ANION GAP SERPL CALCULATED.3IONS-SCNC: 14.8 MMOL/L (ref 5–15)
AST SERPL-CCNC: 14 U/L (ref 1–32)
BILIRUB SERPL-MCNC: 0.4 MG/DL (ref 0.2–1.2)
BUN BLD-MCNC: 16 MG/DL (ref 8–23)
BUN/CREAT SERPL: 15.1 (ref 7–25)
CALCIUM SPEC-SCNC: 8 MG/DL (ref 8.6–10.5)
CHLORIDE SERPL-SCNC: 94 MMOL/L (ref 98–107)
CO2 SERPL-SCNC: 25.2 MMOL/L (ref 22–29)
CREAT BLD-MCNC: 1.06 MG/DL (ref 0.57–1)
GFR SERPL CREATININE-BSD FRML MDRD: 51 ML/MIN/1.73
GLOBULIN UR ELPH-MCNC: 3 GM/DL
GLUCOSE BLD-MCNC: 126 MG/DL (ref 65–99)
HCT VFR BLD AUTO: 32 % (ref 34–46.6)
HGB BLD-MCNC: 11 G/DL (ref 12–15.9)
POTASSIUM BLD-SCNC: 3.1 MMOL/L (ref 3.5–5.2)
PROT SERPL-MCNC: 5.8 G/DL (ref 6–8.5)
SODIUM BLD-SCNC: 134 MMOL/L (ref 136–145)

## 2020-01-08 PROCEDURE — A9270 NON-COVERED ITEM OR SERVICE: HCPCS | Performed by: OBSTETRICS & GYNECOLOGY

## 2020-01-08 PROCEDURE — 63710000001 IBUPROFEN 400 MG TABLET: Performed by: OBSTETRICS & GYNECOLOGY

## 2020-01-08 PROCEDURE — 85018 HEMOGLOBIN: CPT | Performed by: OBSTETRICS & GYNECOLOGY

## 2020-01-08 PROCEDURE — 85014 HEMATOCRIT: CPT | Performed by: OBSTETRICS & GYNECOLOGY

## 2020-01-08 PROCEDURE — 80053 COMPREHEN METABOLIC PANEL: CPT | Performed by: OBSTETRICS & GYNECOLOGY

## 2020-01-08 PROCEDURE — 63710000001 ASPIRIN 81 MG TABLET DELAYED-RELEASE: Performed by: OBSTETRICS & GYNECOLOGY

## 2020-01-08 PROCEDURE — 63710000001 ACETAMINOPHEN-CODEINE 300-30 MG TABLET: Performed by: OBSTETRICS & GYNECOLOGY

## 2020-01-08 PROCEDURE — 63710000001 ROSUVASTATIN 5 MG TABLET: Performed by: OBSTETRICS & GYNECOLOGY

## 2020-01-08 PROCEDURE — G0378 HOSPITAL OBSERVATION PER HR: HCPCS

## 2020-01-08 PROCEDURE — 63710000001 DOCUSATE SODIUM 100 MG CAPSULE: Performed by: OBSTETRICS & GYNECOLOGY

## 2020-01-08 PROCEDURE — 63710000001 POTASSIUM CHLORIDE 10 MEQ CAPSULE CONTROLLED-RELEASE: Performed by: OBSTETRICS & GYNECOLOGY

## 2020-01-08 PROCEDURE — 63710000001 OXYCODONE-ACETAMINOPHEN 5-325 MG TABLET: Performed by: OBSTETRICS & GYNECOLOGY

## 2020-01-08 PROCEDURE — 25010000002 ENOXAPARIN PER 10 MG: Performed by: OBSTETRICS & GYNECOLOGY

## 2020-01-08 RX ORDER — POTASSIUM CHLORIDE 750 MG/1
40 CAPSULE, EXTENDED RELEASE ORAL AS NEEDED
Status: DISCONTINUED | OUTPATIENT
Start: 2020-01-08 | End: 2020-01-08 | Stop reason: HOSPADM

## 2020-01-08 RX ORDER — POTASSIUM CHLORIDE 1.5 G/1.77G
40 POWDER, FOR SOLUTION ORAL AS NEEDED
Status: DISCONTINUED | OUTPATIENT
Start: 2020-01-08 | End: 2020-01-08 | Stop reason: HOSPADM

## 2020-01-08 RX ORDER — ACETAMINOPHEN AND CODEINE PHOSPHATE 300; 30 MG/1; MG/1
1 TABLET ORAL EVERY 4 HOURS PRN
Status: DISCONTINUED | OUTPATIENT
Start: 2020-01-08 | End: 2020-01-08 | Stop reason: HOSPADM

## 2020-01-08 RX ADMIN — ROSUVASTATIN CALCIUM 5 MG: 5 TABLET, FILM COATED ORAL at 08:34

## 2020-01-08 RX ADMIN — CLINDAMYCIN PHOSPHATE 600 MG: 600 INJECTION, SOLUTION INTRAVENOUS at 01:13

## 2020-01-08 RX ADMIN — IBUPROFEN 400 MG: 400 TABLET, FILM COATED ORAL at 01:12

## 2020-01-08 RX ADMIN — IBUPROFEN 400 MG: 400 TABLET, FILM COATED ORAL at 12:17

## 2020-01-08 RX ADMIN — CLINDAMYCIN PHOSPHATE 600 MG: 600 INJECTION, SOLUTION INTRAVENOUS at 15:10

## 2020-01-08 RX ADMIN — POTASSIUM CHLORIDE 40 MEQ: 750 CAPSULE, EXTENDED RELEASE ORAL at 15:10

## 2020-01-08 RX ADMIN — ASPIRIN 81 MG: 81 TABLET, COATED ORAL at 08:35

## 2020-01-08 RX ADMIN — DEXTROSE AND SODIUM CHLORIDE 100 ML/HR: 5; 450 INJECTION, SOLUTION INTRAVENOUS at 09:53

## 2020-01-08 RX ADMIN — POTASSIUM CHLORIDE 40 MEQ: 750 CAPSULE, EXTENDED RELEASE ORAL at 19:14

## 2020-01-08 RX ADMIN — IBUPROFEN 400 MG: 400 TABLET, FILM COATED ORAL at 06:42

## 2020-01-08 RX ADMIN — OXYCODONE AND ACETAMINOPHEN 1 TABLET: 5; 325 TABLET ORAL at 01:14

## 2020-01-08 RX ADMIN — IBUPROFEN 400 MG: 400 TABLET, FILM COATED ORAL at 17:31

## 2020-01-08 RX ADMIN — ACETAMINOPHEN AND CODEINE PHOSPHATE 1 TABLET: 300; 30 TABLET ORAL at 12:17

## 2020-01-08 RX ADMIN — DOCUSATE SODIUM 100 MG: 100 CAPSULE, LIQUID FILLED ORAL at 01:12

## 2020-01-08 RX ADMIN — DOCUSATE SODIUM 100 MG: 100 CAPSULE, LIQUID FILLED ORAL at 08:34

## 2020-01-08 RX ADMIN — ENOXAPARIN SODIUM 40 MG: 40 INJECTION SUBCUTANEOUS at 08:35

## 2020-01-08 NOTE — PLAN OF CARE
Problem: Patient Care Overview  Goal: Plan of Care Review  Outcome: Ongoing (interventions implemented as appropriate)  Flowsheets (Taken 1/8/2020 0500)  Progress: improving  Plan of Care Reviewed With: patient  Outcome Summary: Scheduled ibuprofen given for pain. Afebrile and VS stable. small amt of vaginal bleeding. vag pack and f/c to be removed this am.

## 2020-01-08 NOTE — ANESTHESIA POSTPROCEDURE EVALUATION
"Patient: Marcia Ulloa    Procedure Summary     Date:  01/07/20 Room / Location:  [facility] OR 09 / [facility] MAIN OR    Anesthesia Start:  1301 Anesthesia Stop:  1934    Procedures:       LAPAROSCOPIC UTEROSCRAL LIGAMENT SUSPENSION/SACRAL COLPOPEXY; LAPAROSCOPIC PARAVAGINAL REPAIR; TOTAL LAPAROSCPIC HYSTERECTOMY WITH BILATERAL SALPINGECTOMY; (N/A Abdomen)      LAPAROSCOPIC/ABDOMINAL ENTEROCELE REPAIR; (N/A Vagina)      PUBOVAGINAL SLING; POSTERIOR COLPORRHAPHY; ANTERIOR COLPORRHAPHY; ENTRAPERITONEAL COLPOPEXY SACROSPINUS LIGAMENT FIXATION; INSERTION OF VAGINAL GRAFT; CYSTOURETHROSCOPY (N/A Vagina) Diagnosis:      Surgeon:  Senia Frederick MD Provider:  Chaz Medina MD    Anesthesia Type:  general ASA Status:  2          Anesthesia Type: general    Vitals  Vitals Value Taken Time   /64 1/7/2020  8:30 PM   Temp     Pulse 68 1/7/2020  8:35 PM   Resp 20 1/7/2020  7:45 PM   SpO2 100 % 1/7/2020  8:35 PM   Vitals shown include unvalidated device data.        Post Anesthesia Care and Evaluation    Patient location during evaluation: bedside  Patient participation: complete - patient participated  Level of consciousness: awake  Pain score: 2  Pain management: adequate  Airway patency: patent  Anesthetic complications: No anesthetic complications  PONV Status: none  Cardiovascular status: acceptable  Respiratory status: acceptable  Hydration status: acceptable    Comments: /70   Pulse 67   Temp 37.2 °C (98.9 °F) (Oral)   Resp 20   Ht 170.2 cm (67\")   Wt 67.2 kg (148 lb 3.2 oz)   SpO2 100%   BMI 23.21 kg/m²         "

## 2020-01-08 NOTE — BRIEF OP NOTE
TOTAL LAPAROSCOPIC HYSTERECTOMY AND COLPOPEXY AND UTEROSACRAL SUSPENSION, ANTERIOR AND POSTERIOR VAGINAL REPAIR, PUBO VAGINAL SLING  Progress Note    Marcia Ulloa  1/7/2020    Pre-op Diagnosis:   Uterovaginal prolapse       Post-Op Diagnosis Codes:  Uterovaginal prolapse    Procedure/CPT® Codes:      Procedure(s):  LAPAROSCOPIC UTEROSCRAL LIGAMENT SUSPENSION/SACRAL COLPOPEXY; LAPAROSCOPIC PARAVAGINAL REPAIR; TOTAL LAPAROSCPIC HYSTERECTOMY WITH BILATERAL SALPINGO-OOPHORECTOMY;  COMBINED ANTERIOPOSTERIOR COLPORRHAPHY WITH ENTROCELE REPAIR, VAGINAL APPROACH; LAPAROSCOPIC/ABDOMINAL ENTEROCELE REAPIR;  PUBOVAGINAL SLING LAPAROSCOPIC BILATERAL SALPINGECTOMY POSTERIOR COLPORRHAPHY; ANTERIOR COLPORRHAPHY; ENTRAPERITONEAL COLPOPEXY SACROSPINUS LIGAMENT FIXATION; INSERTION OF VAGINAL GRAFT x2; CYSTOURETHROSCOPY AND ANY OTHER INDICATED PROCEDURES    Surgeon(s):  Senia Frederick MD    Anesthesia: General    Staff:   Circulator: Kendall Banegas RN; Elayne Licona RN; Lexy Bee RN; Christina Nesbitt RN  Scrub Person: Sharon Thomas; Ting Hudson; Minna Berger  Assistant: Estela Huerta PA    Estimated Blood Loss: 200 milliliters    Urine: 650 milliliters    Specimens:                Specimens     ID Source Type Tests Collected By Collected At Frozen?      A Fallopian Tube, Left Tissue · TISSUE PATHOLOGY EXAM   Senia Frederick MD 1/7/20 1420      Description: LEFT FALLOPIAN TUBE    B Fallopian Tube, Right Tissue · TISSUE PATHOLOGY EXAM   Senia Frederick MD 1/7/20 1423      Description: RIGHT FALLOPIAN TUBE    C Uterus with Cervix Tissue · TISSUE PATHOLOGY EXAM   Senia Frederick MD 1/7/20 1424      Description: UTERUS AND CERVIX                Drains:   Urinary catheter  Urethral Catheter Silicone (Active)       [REMOVED] Urethral Catheter 16 Fr. (Removed)       Findings: bilateral efflux of urine ureteral orifices    Complications: none      Senia Frederick MD     Date: 1/7/2020  Time: 7:20 PM

## 2020-01-09 LAB
CYTO UR: NORMAL
LAB AP CASE REPORT: NORMAL
PATH REPORT.FINAL DX SPEC: NORMAL
PATH REPORT.GROSS SPEC: NORMAL

## 2020-01-09 NOTE — PROGRESS NOTES
DAILY PROGRESS NOTE    Patient Identification:  Name: Marcia Ulloa  Age: 74 y.o.  Sex: female  :  1945  MRN: [id number]              Subjective:  Interval History: [unfilled]     Objective:    Scheduled Meds:   Current Facility-Administered Medications:   •  acetaminophen-codeine (TYLENOL #3) 300-30 MG per tablet 1 tablet, 1 tablet, Oral, Q4H PRN, Senia Frederick MD, 1 tablet at 20 1217  •  aspirin EC tablet 81 mg, 81 mg, Oral, Daily, Senia Frederick MD, 81 mg at 20 0835  •  dextrose 5 % and sodium chloride 0.45 % infusion, 100 mL/hr, Intravenous, Continuous, Senia Frederick MD, Stopped at 20 1731  •  docusate sodium (COLACE) capsule 100 mg, 100 mg, Oral, BID, Senia Frederick MD, 100 mg at 20 0834  •  enoxaparin (LOVENOX) syringe 40 mg, 40 mg, Subcutaneous, Daily, Senia Frederick MD, 40 mg at 20 0835  •  hydroCHLOROthiazide (HYDRODIURIL) tablet 25 mg, 25 mg, Oral, Daily, Senia Frederick MD  •  ibuprofen (ADVIL,MOTRIN) tablet 400 mg, 400 mg, Oral, Q6H, Senia Frederick MD, 400 mg at 20 1731  •  lactated ringers infusion, 9 mL/hr, Intravenous, Continuous, Corinna Desai MD, Last Rate: 9 mL/hr at 20 1010  •  melatonin tablet 1 mg, 1 mg, Oral, Nightly PRN, Senia Frederick MD  •  morphine injection 1 mg, 1 mg, Intravenous, Q2H PRN **AND** naloxone (NARCAN) injection 0.4 mg, 0.4 mg, Intravenous, Q5 Min PRN, Senia Frederick MD  •  morphine injection 2 mg, 2 mg, Intravenous, Q2H PRN **AND** naloxone (NARCAN) injection 0.4 mg, 0.4 mg, Intravenous, Q5 Min PRN, Senia Frederick MD  •  ondansetron (ZOFRAN) tablet 4 mg, 4 mg, Oral, Q6H PRN **OR** ondansetron (ZOFRAN) injection 4 mg, 4 mg, Intravenous, Q6H PRN, Senia Frederick MD  •  potassium chloride (KLOR-CON) packet 40 mEq, 40 mEq, Oral, PRN, Senia Frederick MD  •  potassium chloride (MICRO-K) CR capsule 40 mEq, 40 mEq, Oral, PRN, Senia Frederick MD, 40 mEq at 20  •  promethazine (PHENERGAN)  injection 12.5 mg, 12.5 mg, Intravenous, Q6H PRN **OR** promethazine (PHENERGAN) injection 12.5 mg, 12.5 mg, Intramuscular, Q6H PRN, Senia Frederick MD  •  rosuvastatin (CRESTOR) tablet 5 mg, 5 mg, Oral, Daily, Senia Frederick MD, 5 mg at 01/08/20 0834  dextrose 5 % and sodium chloride 0.45 % 100 mL/hr Last Rate: Stopped (01/08/20 1731)   lactated ringers 9 mL/hr Last Rate: 9 mL/hr (01/07/20 1010)     Continuous Infusions:  dextrose 5 % and sodium chloride 0.45 % 100 mL/hr Last Rate: Stopped (01/08/20 1731)   lactated ringers 9 mL/hr Last Rate: 9 mL/hr (01/07/20 1010)     PRN Meds:•  acetaminophen-codeine  •  melatonin  •  Morphine **AND** naloxone  •  Morphine **AND** naloxone  •  ondansetron **OR** ondansetron  •  potassium chloride  •  potassium chloride  •  promethazine **OR** promethazine    Vital signs in last 24 hours:  Temp:  [96.7 °F (35.9 °C)-98.7 °F (37.1 °C)] 97.5 °F (36.4 °C)  Heart Rate:  [69-96] 82  Resp:  [16-20] 16  BP: (100-131)/(58-78) 101/58       Exam:  Abdomen soft appropriately tender  Bandages clean incisions look fine    Data Review:  Lab Results (last 24 hours)     Procedure Component Value Units Date/Time    Comprehensive Metabolic Panel [[id number]]  (Abnormal) Collected:  01/08/20 0418    Specimen:  Blood Updated:  01/08/20 0541     Glucose 126 mg/dL      BUN 16 mg/dL      Creatinine 1.06 mg/dL      Sodium 134 mmol/L      Potassium 3.1 mmol/L      Chloride 94 mmol/L      CO2 25.2 mmol/L      Calcium 8.0 mg/dL      Total Protein 5.8 g/dL      Albumin 2.80 g/dL      ALT (SGPT) 11 U/L      AST (SGOT) 14 U/L      Alkaline Phosphatase 55 U/L      Total Bilirubin 0.4 mg/dL      eGFR Non African Amer 51 mL/min/1.73      Globulin 3.0 gm/dL      A/G Ratio 0.9 g/dL      BUN/Creatinine Ratio 15.1     Anion Gap 14.8 mmol/L     Narrative:       GFR Normal >60  Chronic Kidney Disease <60  Kidney Failure <15      Hemoglobin & Hematocrit, Blood [[id number]]  (Abnormal) Collected:  01/08/20 0417     Specimen:  Blood Updated:  01/08/20 0525     Hemoglobin 11.0 g/dL      Hematocrit 32.0 %           Assessment:    Uterovaginal prolapse, incomplete    Cystocele, midline    Cystocele, lateral    Vaginal enterocele, congenital or acquired    Loss of perineal body, female    Female stress incontinence    Incomplete defecation    Incompetence or weakening of pubocervical tissue    Incompetence or weakening of rectovaginal tissue    Personal history of endometriosis        Plan:  POD #1   Failed voiding trial  Home with urinary catheter   Call office tomorrow to arrange for catheter management

## 2020-01-10 NOTE — PROGRESS NOTES
Discharge Planning Assessment  [facility]     Patient Name: Marcia Ulloa  MRN: [id number]  Today's Date: 1/10/2020    Admit Date: 1/7/2020        Discharge Plan     Row Name 01/10/20 0754       Plan    Plan Comments  Discharged. No discharge needs identified.    Final Discharge Disposition Code  01 - home or self-care     Expected Discharge Date and Time     Expected Discharge Date Expected Discharge Time    Jan 8, 2020      Kinza López RN

## 2020-01-10 NOTE — DISCHARGE SUMMARY
Physician Discharge Summary  Patient Identification:  Name: Marcia Ulloa  Age: 74 y.o.  Sex: female  :  1945  MRN: [id number]    Admit date: 2020    Discharge date and time: 2020  9:49 PM    Admitting Physician: Senia Frederick MD    Discharge Physician: Senia Frederick MD    Admission Diagnoses: Uterovaginal prolapse, incomplete [N81.2]    Hospital Problems:   Active Problems:    Uterovaginal prolapse, incomplete    Cystocele, midline    Cystocele, lateral    Vaginal enterocele, congenital or acquired    Loss of perineal body, female    Female stress incontinence    Incomplete defecation    Incompetence or weakening of pubocervical tissue    Incompetence or weakening of rectovaginal tissue    Personal history of endometriosis        Discharge Diagnoses: same    Discharged Condition: good    Hospital Course: surgery and post operative care    Disposition:  Home    Patient Instructions:   [unfilled]  Follow-up Information     Bk Mendez MD .    Specialty:  Family Medicine  Contact information:  [id number] [id number] [phone]                   POD #1   Failed voiding trial  Home with urinary catheter       Signed:  Senia Fredreick MD  1/10/2020

## 2020-01-16 NOTE — OP NOTE
OPERATIVE REPORT     [facility]     Patient:   Marcia Ulloa    :     1945     Date of Operations:  2020     PREOPERATIVE DIAGNOSES:   1.  Uterovaginal prolapse, N81.2.  2.  Cystocele, N81.11, N81.12.   3.  Rectocele, N81.6.   4.  Enterocele, N81.5.   5.  Stress urinary incontinence, N39.3.  6.  Weak pubocervical tissue, N81.82.  7.  Weak rectovaginal tissue, N81.83.  8.  Incomplete defecation, R15.0  9. Loss or perineal body, N81.89       POSTOPERATIVE DIAGNOSES: Same      SURGEON(S): Senia Frederick M.D., MSc, FACOG, FACS    ASSISTANT(S): KAROLINA Swanson          PROCEDURE(S) PERFORMED:   1. Laparoscopic uterosacral ligament colpopexy, 43917.   2. Laparoscopic paravaginal repair, 81289.  3. Laparoscopic hysterectomy with bilateral salpingectomy, 59737.        4.   Laparoscopic abdominal enterocele repair, 98714        5.   Pubovaginal sling, retropubic, porcine, 71309.        6.   Posterior colporrhaphy, 91207.         7.   Extraperitoneal colpopexy sacrospinous ligament fixation, 03492        8.   Insertion of rectovaginal graft, 25977 add on         9.  Cystourethroscopy     FINDING(S): On cystourethroscopy following all the procedures, there was bilateral efflux of Pyridium stained urine from both the right and the left ureteral orifices. There were no lesions or foreign material of the bladder or the urethra. The left and right ovaries appeared normal and remained in situ.     SPECIMEN(S):   1. Left fallopian tube   2. Right fallopian tube  3. Uterus and cervix       DESCRIPTION OF PROCEDURE(S): The patient was taken to the Operating Room with a peripheral IV in place. She underwent the induction of general endotracheal anesthesia, was prepped and draped in the dorsal lithotomy position in [facility]. Cystourethroscopy showed no lesions or foreign material of the bladder or the urethra and there was bilateral efflux of Pyridium   stained urine from both the right and the left  ureteral orifices. The cystoscope was removed and a Briscoe catheter was placed and set to straight drainage. A uterine manipulator was sewn into the uterine cervix for manipulation.   A left upper quadrant skin incision was made, a Veress needle inserted, and a pneumoperitoneum introduced. The Veress needle was removed and a 5 millimeter Optiview was placed in the left upper quadrant. Under direct visualization, an 11 millimeter left lateral and 5 millilter suprapubic, subumbilical, and right lateral ports were placed. The patient was placed in Trendelenburg and the bowel lifted superiorly. The left and right fallopian tubes were removed with the Harmonic scalpel and passed off the field. The Harmonic scalpel was used to create a bladder flap anteriorly and to skeletonize and take the uterine arteries bilaterally. The monopolar J-hook cautery was used to completely remove and detach the cervix from the vaginal apex. The uterus with cervix were passed off the field. Hemostasis was excellent. The apex of the vagina was closed laparoscopically with interrupted 0 Vicryl sutures. With a Breisky retractor in the vagina, the posterior rectovaginal fascia was dissected from the rectovaginal serosa and with a Lucite dilator in the vagina, the pubocervical fascia was dissected from the pubocervical serosa. With a Briesky retractor in the vagina, the paravaginal tissue on the left and on the right was reattached with #1 Prolene and the uterosacral ligaments were attached to the paravaginal tissue ipsilaterally bilaterally to accomplish the laparoscopic bilateral paravaginal repair. With a Breisky retractor in the vagina, the uterosacral ligaments were placed on stretch and #1 Prolene was used to go through the right uterosacral ligament, the right rectovaginal fascia, the right pubocervical fascia and held. #1 Prolene was used to go through the left uterosacral ligament, the left rectovaginal fascia, the left pubocervical  fascia and both these sutures were tied accomplishing the laparoscopic uterosacral ligament colpopexy.  #1 Prolene was used through the abdominal approach of a laparoscopic enterocele repair attaching the superiormost portion of the detached rectovaginal fascia and the pubocervical fascia at the apex of the vagina in the midline to the right uterosacral ligament and these sutures were tied laparoscopically. An additional suture was placed in the midline to close the enterocele and attached to the left uterosacral ligament. The cystourethroscopy showed no lesions or foreign material of the bladder or the urethra and there was bilateral efflux of Pyridium stained urine from both the right and left ureteral orifices. The cystoscope was removed and a Briscoe catheter was placed and set to straight drainage. The CO2 gas was allowed to escape and the left 11 millimeter port was closed with Ras Reeder Sure Close with 0 Vicryl. The remainder of the ports were removed under direct visualization and all were hemostatic and these were closed with 4-0 Vicryl. A suburethral incision was made and dissected bilaterally. A sheet of porcine material was used to create a porcine sling. This retropubic 1 centimeter porcine sling was placed.  Cystourethroscopy with each transvaginal introducer in place showed no lesions or foreign material of the bladder or the urethra. The pubovaginal sling was adjusted without tension so that a curved Mtz easily fit between the suburethral tissue and the tape. The excess suprapubic tape ends were trimmed and the skin lifted away. These incisions were closed with 4- 0 Vicryl suture. With a Briscoe catheter in place the suburethral incision was closed with 2-0 Vicryl and was hemostatic. The anterior vaginal wall was opened and the bladder dissected from the vaginal epithelium and 2-0 PDS in interrupted sutures were used to plicate the pubocervical fascia in the midline. The vaginal epithelium was  trimmed minimally and the anterior vaginal epithelium was closed with 2-0 Vicryl accomplishing the anterior colporrhaphy.A posterior vaginal incision was made and the rectum dissected from the vagina. The sacrospinous ligaments were identified bilaterally and 0 permanent suture on a Capio Slim was placed in the right sacrospinous ligament and into the left sacrospinous ligament.  These sutures were attached to a T-shaped porcine graft and tied down to accomplish the extraperitoneal colpopexy sacrospinous ligament fixation. The inferior portion of the graft was tied down with 0 Vicryl.  The posterior colporrhaphy was performed, plicating the rectovaginal fascia in the midline with 0 Vicryl interrupted sutures. The perineorrhaphy was performed with interrupted 0 Vicryl.  The vaginal epithelium was closed with 2-0 Vicryl. The urethral caruncle prolapse was removed and cautery used for hemostasis  Cystourethroscopy showed no lesions or foreign material of the bladder or the urethra and there was bilateral efflux of Pyridium stained urine from both the right and the left ureteral orifices. The cystoscope was removed and a Briscoe catheter was placed and set to straight drainage. An estrogen permeated vaginal pack was placed into the vagina. The patient was taken out of the dorsal lithotomy position, awakened from anesthesia and taken to the Recovery Room in good condition.  There were no complications to the procedures.  All final needle, sponge, and instrument counts were reported as correct.      ESTIMATED BLOOD LOSS:  200 milliliters.      FLUIDS:  1700 milliliters crystalloid.      URINE OUTPUT: 650 milliliters.                    MORA GODOY MD, MSc, FACOG, FACS

## 2020-01-24 RX ORDER — HYDROCHLOROTHIAZIDE 25 MG/1
TABLET ORAL
Qty: 90 TABLET | Refills: 1 | Status: SHIPPED | OUTPATIENT
Start: 2020-01-24 | End: 2020-06-11 | Stop reason: SDUPTHER

## 2020-03-11 DIAGNOSIS — E78.49 OTHER HYPERLIPIDEMIA: ICD-10-CM

## 2020-03-11 RX ORDER — ROSUVASTATIN CALCIUM 5 MG/1
5 TABLET, COATED ORAL DAILY
Qty: 90 TABLET | Refills: 0 | Status: SHIPPED | OUTPATIENT
Start: 2020-03-11 | End: 2020-06-11 | Stop reason: SDUPTHER

## 2020-06-11 ENCOUNTER — OFFICE VISIT (OUTPATIENT)
Dept: INTERNAL MEDICINE | Facility: CLINIC | Age: 75
End: 2020-06-11

## 2020-06-11 ENCOUNTER — LAB (OUTPATIENT)
Dept: LAB | Facility: HOSPITAL | Age: 75
End: 2020-06-11

## 2020-06-11 VITALS
HEART RATE: 74 BPM | TEMPERATURE: 98.5 F | WEIGHT: 148.9 LBS | DIASTOLIC BLOOD PRESSURE: 74 MMHG | OXYGEN SATURATION: 95 % | BODY MASS INDEX: 23.37 KG/M2 | HEIGHT: 67 IN | RESPIRATION RATE: 16 BRPM | SYSTOLIC BLOOD PRESSURE: 116 MMHG

## 2020-06-11 DIAGNOSIS — I10 ESSENTIAL HYPERTENSION: Primary | ICD-10-CM

## 2020-06-11 DIAGNOSIS — E78.49 OTHER HYPERLIPIDEMIA: ICD-10-CM

## 2020-06-11 DIAGNOSIS — F51.01 PRIMARY INSOMNIA: ICD-10-CM

## 2020-06-11 DIAGNOSIS — Z12.31 ENCOUNTER FOR SCREENING MAMMOGRAM FOR MALIGNANT NEOPLASM OF BREAST: ICD-10-CM

## 2020-06-11 LAB
ALBUMIN SERPL-MCNC: 4.4 G/DL (ref 3.5–5.2)
ALBUMIN/GLOB SERPL: 1.3 G/DL
ALP SERPL-CCNC: 83 U/L (ref 39–117)
ALT SERPL W P-5'-P-CCNC: 20 U/L (ref 1–33)
ANION GAP SERPL CALCULATED.3IONS-SCNC: 11.1 MMOL/L (ref 5–15)
AST SERPL-CCNC: 22 U/L (ref 1–32)
BASOPHILS # BLD AUTO: 0.09 10*3/MM3 (ref 0–0.2)
BASOPHILS NFR BLD AUTO: 1.1 % (ref 0–1.5)
BILIRUB SERPL-MCNC: 0.3 MG/DL (ref 0.2–1.2)
BUN BLD-MCNC: 24 MG/DL (ref 8–23)
BUN/CREAT SERPL: 17.6 (ref 7–25)
CALCIUM SPEC-SCNC: 10 MG/DL (ref 8.6–10.5)
CHLORIDE SERPL-SCNC: 101 MMOL/L (ref 98–107)
CHOLEST SERPL-MCNC: 143 MG/DL (ref 0–200)
CO2 SERPL-SCNC: 29.9 MMOL/L (ref 22–29)
CREAT BLD-MCNC: 1.36 MG/DL (ref 0.57–1)
DEPRECATED RDW RBC AUTO: 46.7 FL (ref 37–54)
EOSINOPHIL # BLD AUTO: 0.32 10*3/MM3 (ref 0–0.4)
EOSINOPHIL NFR BLD AUTO: 3.7 % (ref 0.3–6.2)
ERYTHROCYTE [DISTWIDTH] IN BLOOD BY AUTOMATED COUNT: 13.6 % (ref 12.3–15.4)
GFR SERPL CREATININE-BSD FRML MDRD: 38 ML/MIN/1.73
GLOBULIN UR ELPH-MCNC: 3.3 GM/DL
GLUCOSE BLD-MCNC: 113 MG/DL (ref 65–99)
HCT VFR BLD AUTO: 38.9 % (ref 34–46.6)
HDLC SERPL-MCNC: 53 MG/DL (ref 40–60)
HGB BLD-MCNC: 13.2 G/DL (ref 12–15.9)
IMM GRANULOCYTES # BLD AUTO: 0.03 10*3/MM3 (ref 0–0.05)
IMM GRANULOCYTES NFR BLD AUTO: 0.4 % (ref 0–0.5)
LDLC SERPL CALC-MCNC: 64 MG/DL (ref 0–100)
LDLC/HDLC SERPL: 1.2 {RATIO}
LYMPHOCYTES # BLD AUTO: 1.61 10*3/MM3 (ref 0.7–3.1)
LYMPHOCYTES NFR BLD AUTO: 18.8 % (ref 19.6–45.3)
MCH RBC QN AUTO: 31.7 PG (ref 26.6–33)
MCHC RBC AUTO-ENTMCNC: 33.9 G/DL (ref 31.5–35.7)
MCV RBC AUTO: 93.5 FL (ref 79–97)
MONOCYTES # BLD AUTO: 0.86 10*3/MM3 (ref 0.1–0.9)
MONOCYTES NFR BLD AUTO: 10.1 % (ref 5–12)
NEUTROPHILS # BLD AUTO: 5.64 10*3/MM3 (ref 1.7–7)
NEUTROPHILS NFR BLD AUTO: 65.9 % (ref 42.7–76)
NRBC BLD AUTO-RTO: 0 /100 WBC (ref 0–0.2)
PLATELET # BLD AUTO: 254 10*3/MM3 (ref 140–450)
PMV BLD AUTO: 11.2 FL (ref 6–12)
POTASSIUM BLD-SCNC: 3.7 MMOL/L (ref 3.5–5.2)
PROT SERPL-MCNC: 7.7 G/DL (ref 6–8.5)
RBC # BLD AUTO: 4.16 10*6/MM3 (ref 3.77–5.28)
SODIUM BLD-SCNC: 142 MMOL/L (ref 136–145)
TRIGL SERPL-MCNC: 132 MG/DL (ref 0–150)
VLDLC SERPL-MCNC: 26.4 MG/DL (ref 5–40)
WBC NRBC COR # BLD: 8.55 10*3/MM3 (ref 3.4–10.8)

## 2020-06-11 PROCEDURE — 85025 COMPLETE CBC W/AUTO DIFF WBC: CPT | Performed by: FAMILY MEDICINE

## 2020-06-11 PROCEDURE — 99214 OFFICE O/P EST MOD 30 MIN: CPT | Performed by: FAMILY MEDICINE

## 2020-06-11 PROCEDURE — 36415 COLL VENOUS BLD VENIPUNCTURE: CPT | Performed by: FAMILY MEDICINE

## 2020-06-11 PROCEDURE — 80061 LIPID PANEL: CPT | Performed by: FAMILY MEDICINE

## 2020-06-11 PROCEDURE — 80053 COMPREHEN METABOLIC PANEL: CPT | Performed by: FAMILY MEDICINE

## 2020-06-11 RX ORDER — ROSUVASTATIN CALCIUM 5 MG/1
5 TABLET, COATED ORAL DAILY
Qty: 90 TABLET | Refills: 1 | Status: SHIPPED | OUTPATIENT
Start: 2020-06-11 | End: 2020-12-14 | Stop reason: SDUPTHER

## 2020-06-11 RX ORDER — HYDROCHLOROTHIAZIDE 25 MG/1
25 TABLET ORAL DAILY
Qty: 90 TABLET | Refills: 1 | Status: SHIPPED | OUTPATIENT
Start: 2020-06-11 | End: 2020-12-14 | Stop reason: SDUPTHER

## 2020-06-11 RX ORDER — ROSUVASTATIN CALCIUM 5 MG/1
5 TABLET, COATED ORAL DAILY
Qty: 90 TABLET | Refills: 0 | Status: SHIPPED | OUTPATIENT
Start: 2020-06-11 | End: 2020-06-11 | Stop reason: SDUPTHER

## 2020-06-11 RX ORDER — AMITRIPTYLINE HYDROCHLORIDE 25 MG/1
25 TABLET, FILM COATED ORAL NIGHTLY PRN
Qty: 30 TABLET | Refills: 11 | Status: SHIPPED | OUTPATIENT
Start: 2020-06-11 | End: 2020-07-08 | Stop reason: SDUPTHER

## 2020-06-11 NOTE — PROGRESS NOTES
Subjective   Marcia Ulloa is a 75 y.o. female.     Chief Complaint   Patient presents with   • Hypertension         History of Present Illness     Patient has essential hypertension.  Patient blood pressure today's office visit is normal at 116/74.  She currently is being treated with hydrochlorothiazide 25 mg daily.  She does not have any side effects of the medication.    Patient also has hyperlipidemia.  Patient is currently on Crestor 5 mg daily.  Patient denies any side effects of the medication.    Patient is having trouble sleeping.  Patient states that she would like to try some medicine that will help her sleep better.  She does not want anything that can be addicting.  She is trouble falling asleep and staying asleep.      The following portions of the patient's history were reviewed and updated as appropriate: allergies, current medications, past family history, past medical history, past social history, past surgical history and problem list.    Review of Systems   Constitutional: Negative for chills and fever.   HENT: Negative for congestion, rhinorrhea, sinus pain and sore throat.    Eyes: Negative for photophobia and visual disturbance.   Respiratory: Negative for cough, chest tightness and shortness of breath.    Cardiovascular: Negative for chest pain and palpitations.   Gastrointestinal: Negative for diarrhea, nausea and vomiting.   Genitourinary: Negative for dysuria, frequency and urgency.   Skin: Negative for rash and wound.   Neurological: Negative for dizziness and syncope.   Psychiatric/Behavioral: Negative for behavioral problems and confusion.       Objective   Physical Exam   Constitutional: She is oriented to person, place, and time. She appears well-developed and well-nourished.   HENT:   Head: Normocephalic and atraumatic.   Eyes: EOM are normal.   Neck: Normal range of motion. Neck supple.   Neurological: She is alert and oriented to person, place, and time.   Psychiatric: She has  a normal mood and affect. Her behavior is normal.   Nursing note and vitals reviewed.      Vitals:    06/11/20 0813   BP: 116/74   Pulse: 74   Resp: 16   Temp: 98.5 °F (36.9 °C)   SpO2: 95%     Body mass index is 23.32 kg/m².      Assessment/Plan   Marcia was seen today for hypertension.    Diagnoses and all orders for this visit:    Essential hypertension  -     hydroCHLOROthiazide (HYDRODIURIL) 25 MG tablet; Take 1 tablet by mouth Daily.  -     Comprehensive Metabolic Panel  -     CBC & Differential  -     CBC Auto Differential  -     We will continue patient on hydrochlorothiazide 25 mg daily.    Other hyperlipidemia  -     rosuvastatin (CRESTOR) 5 MG tablet; Take 1 tablet by mouth Daily.  -     Lipid Panel With LDL / HDL Ratio  -     We will continue patient on Crestor 5 mg daily.    Primary insomnia  -     amitriptyline (ELAVIL) 25 MG tablet; Take 1 tablet by mouth At Night As Needed for Sleep.  -     Continue amitriptyline 25 mg daily.    Encounter for screening mammogram for malignant neoplasm of breast   -     Mammo Screening Bilateral With CAD          No follow-ups on file.    Dictated utilizing Dragon Voice Recognition Software

## 2020-06-16 NOTE — PROGRESS NOTES
Please inform the patient of the following abnormal results.  Patients kidney function is still declining. I believe she may be seeing a nephrololgist. If not we will need to refer patient to one.

## 2020-07-01 ENCOUNTER — HOSPITAL ENCOUNTER (OUTPATIENT)
Dept: MAMMOGRAPHY | Facility: HOSPITAL | Age: 75
Discharge: HOME | End: 2020-07-01
Admitting: FAMILY MEDICINE

## 2020-07-01 PROCEDURE — 77063 BREAST TOMOSYNTHESIS BI: CPT

## 2020-07-01 PROCEDURE — 77067 SCR MAMMO BI INCL CAD: CPT

## 2020-07-08 DIAGNOSIS — F51.01 PRIMARY INSOMNIA: ICD-10-CM

## 2020-07-08 RX ORDER — AMITRIPTYLINE HYDROCHLORIDE 25 MG/1
25 TABLET, FILM COATED ORAL NIGHTLY PRN
Qty: 30 TABLET | Refills: 11 | Status: SHIPPED | OUTPATIENT
Start: 2020-07-08 | End: 2020-12-14

## 2020-12-14 ENCOUNTER — TELEMEDICINE (OUTPATIENT)
Dept: INTERNAL MEDICINE | Facility: CLINIC | Age: 75
End: 2020-12-14

## 2020-12-14 DIAGNOSIS — I10 ESSENTIAL HYPERTENSION: Primary | ICD-10-CM

## 2020-12-14 DIAGNOSIS — E78.49 OTHER HYPERLIPIDEMIA: ICD-10-CM

## 2020-12-14 PROCEDURE — 99442 PR PHYS/QHP TELEPHONE EVALUATION 11-20 MIN: CPT | Performed by: FAMILY MEDICINE

## 2020-12-14 RX ORDER — ROSUVASTATIN CALCIUM 5 MG/1
5 TABLET, COATED ORAL DAILY
Qty: 90 TABLET | Refills: 3 | Status: SHIPPED | OUTPATIENT
Start: 2020-12-14 | End: 2021-10-06 | Stop reason: SDUPTHER

## 2020-12-14 RX ORDER — HYDROCHLOROTHIAZIDE 25 MG/1
25 TABLET ORAL DAILY
Qty: 90 TABLET | Refills: 3 | Status: SHIPPED | OUTPATIENT
Start: 2020-12-14 | End: 2021-10-06 | Stop reason: SDUPTHER

## 2020-12-14 NOTE — PROGRESS NOTES
Subjective   Marcia Ulloa is a 75 y.o. female.     No chief complaint on file.  Chief complaint hypertension    This visit has been rescheduled as a phone visit to comply with patient safety concerns in accordance with CDC recommendations. Total time of discussion was 15 minutes.    You have chosen to receive care through a telephone visit. Do you consent to use a telephone visit for your medical care today? Yes      History of Present Illness     Patient past medical history for essential hypertension.  Patient is currently on hydrochlorothiazide 25 mg daily.  Patient denies any side effects of the medication.  She states that her blood pressure at today's office visit was 108/85.  She states that she is doing well with the medicine.    Patient also has a past medical history for hyperlipidemia.  Patient is currently Crestor 5 mg daily.  Patient denies any side effects of the medication.    The following portions of the patient's history were reviewed and updated as appropriate: allergies, current medications, past family history, past medical history, past social history, past surgical history and problem list.    Review of Systems   Constitutional: Negative for chills and fever.   HENT: Negative for congestion, rhinorrhea, sinus pain and sore throat.    Eyes: Negative for photophobia and visual disturbance.   Respiratory: Negative for cough, chest tightness and shortness of breath.    Cardiovascular: Negative for chest pain and palpitations.   Gastrointestinal: Negative for diarrhea, nausea and vomiting.   Genitourinary: Negative for dysuria, frequency and urgency.   Skin: Negative for rash and wound.   Neurological: Negative for dizziness and syncope.   Psychiatric/Behavioral: Negative for behavioral problems and confusion.       Objective   Physical Exam  Vitals signs and nursing note reviewed.   Constitutional:       Appearance: She is well-developed.   HENT:      Head: Normocephalic and atraumatic.    Neurological:      Mental Status: She is alert and oriented to person, place, and time.   Psychiatric:         Behavior: Behavior normal.         There were no vitals filed for this visit.  There is no height or weight on file to calculate BMI.      Assessment/Plan   Diagnoses and all orders for this visit:    1. Essential hypertension (Primary)  -     hydroCHLOROthiazide (HYDRODIURIL) 25 MG tablet; Take 1 tablet by mouth Daily.  Dispense: 90 tablet; Refill: 3  -     Comprehensive Metabolic Panel  -     Stable, continue hydrochlorothiazide 25 mg daily.    2. Other hyperlipidemia  -     rosuvastatin (CRESTOR) 5 MG tablet; Take 1 tablet by mouth Daily.  Dispense: 90 tablet; Refill: 3  -     Lipid Panel With LDL / HDL Ratio  -     Stable continue Crestor 5 mg daily.          No follow-ups on file.    Dictated utilizing Dragon Voice Recognition Software

## 2021-03-09 DIAGNOSIS — Z23 IMMUNIZATION DUE: ICD-10-CM

## 2021-06-04 ENCOUNTER — TRANSCRIBE ORDERS (OUTPATIENT)
Dept: ADMINISTRATIVE | Facility: HOSPITAL | Age: 76
End: 2021-06-04

## 2021-06-04 DIAGNOSIS — Z12.31 VISIT FOR SCREENING MAMMOGRAM: Primary | ICD-10-CM

## 2021-06-09 ENCOUNTER — OFFICE VISIT (OUTPATIENT)
Dept: INTERNAL MEDICINE | Facility: CLINIC | Age: 76
End: 2021-06-09

## 2021-06-09 ENCOUNTER — LAB (OUTPATIENT)
Dept: LAB | Facility: HOSPITAL | Age: 76
End: 2021-06-09

## 2021-06-09 VITALS
BODY MASS INDEX: 24.36 KG/M2 | DIASTOLIC BLOOD PRESSURE: 82 MMHG | RESPIRATION RATE: 16 BRPM | OXYGEN SATURATION: 98 % | WEIGHT: 155.6 LBS | HEART RATE: 68 BPM | SYSTOLIC BLOOD PRESSURE: 122 MMHG

## 2021-06-09 DIAGNOSIS — Z13.820 SCREENING FOR OSTEOPOROSIS: ICD-10-CM

## 2021-06-09 DIAGNOSIS — Z09 ENCOUNTER FOR FOLLOW-UP EXAMINATION AFTER COMPLETED TREATMENT FOR CONDITIONS OTHER THAN MALIGNANT NEOPLASM: ICD-10-CM

## 2021-06-09 DIAGNOSIS — Z00.00 HEALTHCARE MAINTENANCE: ICD-10-CM

## 2021-06-09 DIAGNOSIS — R06.2 WHEEZING: ICD-10-CM

## 2021-06-09 DIAGNOSIS — Z00.00 WELL WOMAN EXAM (NO GYNECOLOGICAL EXAM): Primary | ICD-10-CM

## 2021-06-09 DIAGNOSIS — Z00.00 MEDICARE ANNUAL WELLNESS VISIT, SUBSEQUENT: ICD-10-CM

## 2021-06-09 LAB
ALBUMIN SERPL-MCNC: 4.4 G/DL (ref 3.5–5.2)
ALBUMIN/GLOB SERPL: 1.3 G/DL
ALP SERPL-CCNC: 90 U/L (ref 39–117)
ALT SERPL W P-5'-P-CCNC: 20 U/L (ref 1–33)
ANION GAP SERPL CALCULATED.3IONS-SCNC: 12.9 MMOL/L (ref 5–15)
AST SERPL-CCNC: 25 U/L (ref 1–32)
BASOPHILS # BLD AUTO: 0.08 10*3/MM3 (ref 0–0.2)
BASOPHILS NFR BLD AUTO: 0.9 % (ref 0–1.5)
BILIRUB SERPL-MCNC: 0.7 MG/DL (ref 0–1.2)
BUN SERPL-MCNC: 20 MG/DL (ref 8–23)
BUN/CREAT SERPL: 17.5 (ref 7–25)
CALCIUM SPEC-SCNC: 10.1 MG/DL (ref 8.6–10.5)
CHLORIDE SERPL-SCNC: 100 MMOL/L (ref 98–107)
CHOLEST SERPL-MCNC: 158 MG/DL (ref 0–200)
CO2 SERPL-SCNC: 30.1 MMOL/L (ref 22–29)
CREAT SERPL-MCNC: 1.14 MG/DL (ref 0.57–1)
DEPRECATED RDW RBC AUTO: 47.3 FL (ref 37–54)
EOSINOPHIL # BLD AUTO: 0.27 10*3/MM3 (ref 0–0.4)
EOSINOPHIL NFR BLD AUTO: 3 % (ref 0.3–6.2)
ERYTHROCYTE [DISTWIDTH] IN BLOOD BY AUTOMATED COUNT: 13.6 % (ref 12.3–15.4)
GFR SERPL CREATININE-BSD FRML MDRD: 46 ML/MIN/1.73
GLOBULIN UR ELPH-MCNC: 3.3 GM/DL
GLUCOSE SERPL-MCNC: 112 MG/DL (ref 65–99)
HBA1C MFR BLD: 5.7 % (ref 4.8–5.6)
HCT VFR BLD AUTO: 45.2 % (ref 34–46.6)
HDLC SERPL-MCNC: 57 MG/DL (ref 40–60)
HGB BLD-MCNC: 15 G/DL (ref 12–15.9)
IMM GRANULOCYTES # BLD AUTO: 0.03 10*3/MM3 (ref 0–0.05)
IMM GRANULOCYTES NFR BLD AUTO: 0.3 % (ref 0–0.5)
LDLC SERPL CALC-MCNC: 76 MG/DL (ref 0–100)
LDLC/HDLC SERPL: 1.26 {RATIO}
LYMPHOCYTES # BLD AUTO: 1.36 10*3/MM3 (ref 0.7–3.1)
LYMPHOCYTES NFR BLD AUTO: 15.2 % (ref 19.6–45.3)
MCH RBC QN AUTO: 31.5 PG (ref 26.6–33)
MCHC RBC AUTO-ENTMCNC: 33.2 G/DL (ref 31.5–35.7)
MCV RBC AUTO: 95 FL (ref 79–97)
MONOCYTES # BLD AUTO: 0.82 10*3/MM3 (ref 0.1–0.9)
MONOCYTES NFR BLD AUTO: 9.2 % (ref 5–12)
NEUTROPHILS NFR BLD AUTO: 6.36 10*3/MM3 (ref 1.7–7)
NEUTROPHILS NFR BLD AUTO: 71.4 % (ref 42.7–76)
NRBC BLD AUTO-RTO: 0 /100 WBC (ref 0–0.2)
PLATELET # BLD AUTO: 294 10*3/MM3 (ref 140–450)
PMV BLD AUTO: 11.2 FL (ref 6–12)
POTASSIUM SERPL-SCNC: 4 MMOL/L (ref 3.5–5.2)
PROT SERPL-MCNC: 7.7 G/DL (ref 6–8.5)
RBC # BLD AUTO: 4.76 10*6/MM3 (ref 3.77–5.28)
SODIUM SERPL-SCNC: 143 MMOL/L (ref 136–145)
T-UPTAKE NFR SERPL: 1.04 TBI (ref 0.8–1.3)
T4 SERPL-MCNC: 6.36 MCG/DL (ref 4.5–11.7)
TRIGL SERPL-MCNC: 145 MG/DL (ref 0–150)
TSH SERPL DL<=0.05 MIU/L-ACNC: 2.51 UIU/ML (ref 0.27–4.2)
VLDLC SERPL-MCNC: 25 MG/DL (ref 5–40)
WBC # BLD AUTO: 8.92 10*3/MM3 (ref 3.4–10.8)

## 2021-06-09 PROCEDURE — 85025 COMPLETE CBC W/AUTO DIFF WBC: CPT | Performed by: FAMILY MEDICINE

## 2021-06-09 PROCEDURE — 84479 ASSAY OF THYROID (T3 OR T4): CPT | Performed by: FAMILY MEDICINE

## 2021-06-09 PROCEDURE — 1160F RVW MEDS BY RX/DR IN RCRD: CPT | Performed by: FAMILY MEDICINE

## 2021-06-09 PROCEDURE — 80053 COMPREHEN METABOLIC PANEL: CPT | Performed by: FAMILY MEDICINE

## 2021-06-09 PROCEDURE — 83036 HEMOGLOBIN GLYCOSYLATED A1C: CPT | Performed by: FAMILY MEDICINE

## 2021-06-09 PROCEDURE — 84443 ASSAY THYROID STIM HORMONE: CPT | Performed by: FAMILY MEDICINE

## 2021-06-09 PROCEDURE — 80061 LIPID PANEL: CPT | Performed by: FAMILY MEDICINE

## 2021-06-09 PROCEDURE — 99397 PER PM REEVAL EST PAT 65+ YR: CPT | Performed by: FAMILY MEDICINE

## 2021-06-09 PROCEDURE — 84436 ASSAY OF TOTAL THYROXINE: CPT | Performed by: FAMILY MEDICINE

## 2021-06-09 PROCEDURE — 96160 PT-FOCUSED HLTH RISK ASSMT: CPT | Performed by: FAMILY MEDICINE

## 2021-06-09 PROCEDURE — 1170F FXNL STATUS ASSESSED: CPT | Performed by: FAMILY MEDICINE

## 2021-06-09 PROCEDURE — G0439 PPPS, SUBSEQ VISIT: HCPCS | Performed by: FAMILY MEDICINE

## 2021-06-09 PROCEDURE — 36415 COLL VENOUS BLD VENIPUNCTURE: CPT | Performed by: FAMILY MEDICINE

## 2021-06-09 RX ORDER — ALBUTEROL SULFATE 90 UG/1
2 AEROSOL, METERED RESPIRATORY (INHALATION) EVERY 4 HOURS PRN
Qty: 18 G | Refills: 3 | Status: SHIPPED | OUTPATIENT
Start: 2021-06-09 | End: 2021-10-05

## 2021-06-09 NOTE — PROGRESS NOTES
Subjective   Marcia Ulloa is a 76 y.o. female and is here for a comprehensive physical exam. The patient reports no problems.    Pt is UTD with annual gyn exam and mammo           Social History:   Social History     Socioeconomic History   • Marital status:      Spouse name: Not on file   • Number of children: Not on file   • Years of education: Not on file   • Highest education level: Not on file   Tobacco Use   • Smoking status: Never Smoker   • Smokeless tobacco: Never Used   Substance and Sexual Activity   • Alcohol use: Yes     Comment: 1/day   • Drug use: No   • Sexual activity: Never       Family History:   Family History   Problem Relation Age of Onset   • COPD Mother    • Heart failure Mother    • Hypertension Mother    • Heart disease Mother    • Depression Mother    • Vision loss Mother    • Heart failure Father    • Hypertension Father    • Heart disease Father    • Vision loss Maternal Grandfather    • Malig Hyperthermia Neg Hx        Past Medical History:   Past Medical History:   Diagnosis Date   • Breast cancer (CMS/HCC) 2010    Rt   • Drug therapy     femara 5-7 years.   • Eczema    • History of shingles    • Hyperlipidemia    • Hypertension    • Knee pain    • Prolapse of female bladder, acquired    • Seasonal allergies    • Slow to wake up after anesthesia    • SOB (shortness of breath) on exertion    • Urinary incontinence        The following portions of the patient's history were reviewed and updated as appropriate: allergies, current medications, past family history, past medical history, past social history, past surgical history and problem list.    Review of Systems    Review of Systems   Constitutional: Negative for chills and fever.   HENT: Negative for congestion, rhinorrhea, sinus pain and sore throat.    Eyes: Negative for photophobia and visual disturbance.   Respiratory: Negative for cough, chest tightness and shortness of breath.    Cardiovascular: Negative for chest pain  and palpitations.   Gastrointestinal: Negative for diarrhea, nausea and vomiting.   Genitourinary: Negative for dysuria, frequency and urgency.   Skin: Negative for rash and wound.   Neurological: Negative for dizziness and syncope.   Psychiatric/Behavioral: Negative for behavioral problems and confusion.       Objective   Physical Exam  Vitals and nursing note reviewed.   Constitutional:       Appearance: She is well-developed.   HENT:      Head: Normocephalic and atraumatic.      Right Ear: External ear normal.      Left Ear: External ear normal.   Cardiovascular:      Rate and Rhythm: Normal rate and regular rhythm.      Heart sounds: Normal heart sounds.   Pulmonary:      Effort: Pulmonary effort is normal. No respiratory distress.      Breath sounds: Wheezing present.   Abdominal:      Palpations: Abdomen is soft.      Tenderness: There is no abdominal tenderness. There is no guarding.   Musculoskeletal:         General: Normal range of motion.      Cervical back: Normal range of motion and neck supple.   Lymphadenopathy:      Cervical: No cervical adenopathy.   Skin:     General: Skin is warm.   Neurological:      Mental Status: She is alert and oriented to person, place, and time.   Psychiatric:         Behavior: Behavior normal.         Vitals:    06/09/21 1049   BP: 122/82   Pulse: 68   Resp: 16   SpO2: 98%     Body mass index is 24.36 kg/m².      Medications:   Current Outpatient Medications:   •  aspirin 81 MG EC tablet, Take 81 mg by mouth Daily. HELD, Disp: , Rfl:   •  Calcium Carbonate-Vitamin D (CALCIUM-CARB 600 + D PO), Take 1 tablet by mouth Daily., Disp: , Rfl:   •  CRANBERRY PO, Take 1 tablet by mouth Daily., Disp: , Rfl:   •  hydroCHLOROthiazide (HYDRODIURIL) 25 MG tablet, Take 1 tablet by mouth Daily., Disp: 90 tablet, Rfl: 3  •  melatonin 3 MG tablet, Take 3 mg by mouth At Night As Needed for Sleep., Disp: , Rfl:   •  Multiple Vitamin (MULTI VITAMIN DAILY PO), Take 1 tablet by mouth Daily.,  Disp: , Rfl:   •  rosuvastatin (CRESTOR) 5 MG tablet, Take 1 tablet by mouth Daily., Disp: 90 tablet, Rfl: 3  •  TURMERIC PO, Take 1 tablet by mouth Daily. HELD, Disp: , Rfl:   •  albuterol sulfate  (90 Base) MCG/ACT inhaler, Inhale 2 puffs Every 4 (Four) Hours As Needed for Wheezing or Shortness of Air., Disp: 18 g, Rfl: 3       Assessment/Plan   Healthy female exam.      1. Healthcare Maintenance:  2. Patient Counseling:  --Nutrition: Stressed importance of moderation in sodium/caffeine intake, saturated fat and cholesterol, caloric balance, sufficient intake of fresh fruits, vegetables, fiber, calcium and vit D  --Exercise: Recommended 30 minutes of exercise daily.  --Immunizations reviewed.  --Discussed benefits of screening colonoscopy.    Diagnoses and all orders for this visit:    Well woman exam (no gynecological exam)    Medicare annual wellness visit, subsequent    Healthcare maintenance  -     CBC & Differential  -     Cancel: Comprehensive Metabolic Panel  -     Hemoglobin A1c  -     Thyroid Panel With TSH  -     Cancel: Lipid Panel With LDL / HDL Ratio  -     CBC & Differential; Future  -     Comprehensive Metabolic Panel; Future  -     Hemoglobin A1c; Future  -     Thyroid Panel With TSH; Future  -     Lipid Panel With LDL / HDL Ratio; Future    Screening for osteoporosis  -     DEXA Bone Density Axial    Encounter for follow-up examination after completed treatment for conditions other than malignant neoplasm   -     DEXA Bone Density Axial    Wheezing  -     albuterol sulfate  (90 Base) MCG/ACT inhaler; Inhale 2 puffs Every 4 (Four) Hours As Needed for Wheezing or Shortness of Air.        No follow-ups on file.             Dictated utilizing Dragon Voice Recognition Software

## 2021-06-09 NOTE — PROGRESS NOTES
The ABCs of the Annual Wellness Visit  Subsequent Medicare Wellness Visit    Chief Complaint   Patient presents with   • Medicare Wellness-subsequent       Subjective   History of Present Illness:  Marcia Ulloa is a 76 y.o. female who presents for a Subsequent Medicare Wellness Visit.    HEALTH RISK ASSESSMENT    Recent Hospitalizations:  No hospitalization(s) within the last year.    Current Medical Providers:  Patient Care Team:  Bk Mendez MD as PCP - General (Family Medicine)  Senia Frederick MD as Consulting Physician (Obstetrics and Gynecology)  Thaddeus Stafford MD as Consulting Physician (Orthopedic Surgery)  Elayne Paulino MD (Inactive) as Consulting Physician (Gynecology)  Hunter Benito DC (Chiropractic Medicine)  Shahrzad Maza MD as Consulting Physician (Ophthalmology)  Kostas Loomis MD (Dermatology)    Smoking Status:  Social History     Tobacco Use   Smoking Status Never Smoker   Smokeless Tobacco Never Used       Alcohol Consumption:  Social History     Substance and Sexual Activity   Alcohol Use Yes    Comment: 1/day       Depression Screen:   PHQ-2/PHQ-9 Depression Screening 6/9/2021   Little interest or pleasure in doing things 0   Feeling down, depressed, or hopeless 0   Trouble falling or staying asleep, or sleeping too much -   Feeling tired or having little energy -   Poor appetite or overeating -   Feeling bad about yourself - or that you are a failure or have let yourself or your family down -   Trouble concentrating on things, such as reading the newspaper or watching television -   Moving or speaking so slowly that other people could have noticed. Or the opposite - being so fidgety or restless that you have been moving around a lot more than usual -   Thoughts that you would be better off dead, or of hurting yourself in some way -   Total Score 0   If you checked off any problems, how difficult have these problems made it for you to do your work, take care of  things at home, or get along with other people? -       Fall Risk Screen:  LEONARDO Fall Risk Assessment was completed, and patient is at LOW risk for falls.Assessment completed on:6/9/2021    Health Habits and Functional and Cognitive Screening:  Functional & Cognitive Status 6/9/2021   Do you have difficulty preparing food and eating? No   Do you have difficulty bathing yourself, getting dressed or grooming yourself? No   Do you have difficulty using the toilet? No   Do you have difficulty moving around from place to place? No   Do you have trouble with steps or getting out of a bed or a chair? No   Current Diet Well Balanced Diet   Dental Exam Up to date   Eye Exam Up to date   Exercise (times per week) 3 times per week   Current Exercise Activities Include Walking   Do you need help using the phone?  No   Are you deaf or do you have serious difficulty hearing?  No   Do you need help with transportation? No   Do you need help shopping? No   Do you need help preparing meals?  No   Do you need help with housework?  No   Do you need help with laundry? No   Do you need help taking your medications? No   Do you need help managing money? No   Do you ever drive or ride in a car without wearing a seat belt? No   Have you felt unusual stress, anger or loneliness in the last month? No   Who do you live with? Spouse   If you need help, do you have trouble finding someone available to you? No   Have you been bothered in the last four weeks by sexual problems? No   Do you have difficulty concentrating, remembering or making decisions? No         Does the patient have evidence of cognitive impairment? No    Asprin use counseling:Taking ASA appropriately as indicated    Age-appropriate Screening Schedule:  Refer to the list below for future screening recommendations based on patient's age, sex and/or medical conditions. Orders for these recommended tests are listed in the plan section. The patient has been provided with a written  plan.    Health Maintenance   Topic Date Due   • DXA SCAN  Never done   • ZOSTER VACCINE (2 of 2) 01/26/2021   • LIPID PANEL  06/11/2021   • INFLUENZA VACCINE  08/01/2021   • MAMMOGRAM  07/01/2022   • TDAP/TD VACCINES (3 - Td or Tdap) 01/02/2029          The following portions of the patient's history were reviewed and updated as appropriate: allergies, current medications, past family history, past medical history, past social history, past surgical history and problem list.    Outpatient Medications Prior to Visit   Medication Sig Dispense Refill   • aspirin 81 MG EC tablet Take 81 mg by mouth Daily. HELD     • Calcium Carbonate-Vitamin D (CALCIUM-CARB 600 + D PO) Take 1 tablet by mouth Daily.     • CRANBERRY PO Take 1 tablet by mouth Daily.     • hydroCHLOROthiazide (HYDRODIURIL) 25 MG tablet Take 1 tablet by mouth Daily. 90 tablet 3   • melatonin 3 MG tablet Take 3 mg by mouth At Night As Needed for Sleep.     • Multiple Vitamin (MULTI VITAMIN DAILY PO) Take 1 tablet by mouth Daily.     • rosuvastatin (CRESTOR) 5 MG tablet Take 1 tablet by mouth Daily. 90 tablet 3   • TURMERIC PO Take 1 tablet by mouth Daily. HELD       No facility-administered medications prior to visit.       Patient Active Problem List   Diagnosis   • Essential hypertension   • Other hyperlipidemia   • Primary osteoarthritis of knee   • Elevated serum creatinine   • Acquired absence of breast and absent nipple   • Asymptomatic postmenopausal status   • Breast cancer, right breast (CMS/HCC)   • Osteoarthritis of hip   • Personal history of breast cancer   • Uterovaginal prolapse, incomplete   • Cystocele, midline   • Cystocele, lateral   • Vaginal enterocele, congenital or acquired   • Loss of perineal body, female   • Female stress incontinence   • Incomplete defecation   • Incompetence or weakening of pubocervical tissue   • Incompetence or weakening of rectovaginal tissue   • Personal history of endometriosis       Advanced Care  Planning:  ACP discussion was held with the patient during this visit. Patient has an advance directive in EMR which is still valid.     Review of Systems    Compared to one year ago, the patient feels her physical health is better.  Compared to one year ago, the patient feels her mental health is the same.    Reviewed chart for potential of high risk medication in the elderly: yes  Reviewed chart for potential of harmful drug interactions in the elderly:yes    Objective         Vitals:    06/09/21 1049   BP: 122/82   Pulse: 68   Resp: 16   SpO2: 98%   Weight: 70.6 kg (155 lb 9.6 oz)       Body mass index is 24.36 kg/m².  Discussed the patient's BMI with her. The BMI is in the acceptable range.    Physical Exam          Assessment/Plan   Medicare Risks and Personalized Health Plan  CMS Preventative Services Quick Reference  Breast Cancer/Mammogram Screening  Cardiovascular risk  Osteoporosis Risk    The above risks/problems have been discussed with the patient.  Pertinent information has been shared with the patient in the After Visit Summary.  Follow up plans and orders are seen below in the Assessment/Plan Section.    Diagnoses and all orders for this visit:    1. Well woman exam (no gynecological exam) (Primary)    2. Medicare annual wellness visit, subsequent    3. Healthcare maintenance  -     CBC & Differential  -     Comprehensive Metabolic Panel  -     Hemoglobin A1c  -     Thyroid Panel With TSH  -     Lipid Panel With LDL / HDL Ratio  -     CBC & Differential; Future  -     Comprehensive Metabolic Panel; Future  -     Hemoglobin A1c; Future  -     Thyroid Panel With TSH; Future  -     Lipid Panel With LDL / HDL Ratio; Future      Follow Up:  No follow-ups on file.     An After Visit Summary and PPPS were given to the patient.

## 2021-08-05 ENCOUNTER — HOSPITAL ENCOUNTER (OUTPATIENT)
Dept: MAMMOGRAPHY | Facility: HOSPITAL | Age: 76
Discharge: HOME | End: 2021-08-05
Admitting: FAMILY MEDICINE

## 2021-08-05 DIAGNOSIS — Z12.31 VISIT FOR SCREENING MAMMOGRAM: ICD-10-CM

## 2021-08-05 PROCEDURE — 77067 SCR MAMMO BI INCL CAD: CPT

## 2021-08-05 PROCEDURE — 77063 BREAST TOMOSYNTHESIS BI: CPT

## 2021-08-13 ENCOUNTER — HOSPITAL ENCOUNTER (OUTPATIENT)
Dept: BONE DENSITY | Facility: HOSPITAL | Age: 76
Discharge: HOME | End: 2021-08-13
Admitting: FAMILY MEDICINE

## 2021-08-13 PROCEDURE — 77080 DXA BONE DENSITY AXIAL: CPT

## 2021-10-02 DIAGNOSIS — R06.2 WHEEZING: ICD-10-CM

## 2021-10-05 RX ORDER — ALBUTEROL SULFATE 90 UG/1
2 AEROSOL, METERED RESPIRATORY (INHALATION) EVERY 4 HOURS PRN
Qty: 18 G | Refills: 3 | Status: SHIPPED | OUTPATIENT
Start: 2021-10-05 | End: 2021-10-06 | Stop reason: SDUPTHER

## 2021-10-06 ENCOUNTER — OFFICE VISIT (OUTPATIENT)
Dept: INTERNAL MEDICINE | Facility: CLINIC | Age: 76
End: 2021-10-06

## 2021-10-06 VITALS
BODY MASS INDEX: 25.39 KG/M2 | HEIGHT: 67 IN | HEART RATE: 74 BPM | SYSTOLIC BLOOD PRESSURE: 124 MMHG | DIASTOLIC BLOOD PRESSURE: 78 MMHG | OXYGEN SATURATION: 96 % | WEIGHT: 161.8 LBS

## 2021-10-06 DIAGNOSIS — E78.49 OTHER HYPERLIPIDEMIA: ICD-10-CM

## 2021-10-06 DIAGNOSIS — R06.2 WHEEZING: ICD-10-CM

## 2021-10-06 DIAGNOSIS — I10 ESSENTIAL HYPERTENSION: Primary | ICD-10-CM

## 2021-10-06 PROCEDURE — 99214 OFFICE O/P EST MOD 30 MIN: CPT | Performed by: FAMILY MEDICINE

## 2021-10-06 RX ORDER — HYDROCHLOROTHIAZIDE 25 MG/1
25 TABLET ORAL DAILY
Qty: 90 TABLET | Refills: 3 | Status: SHIPPED | OUTPATIENT
Start: 2021-10-06 | End: 2022-10-11

## 2021-10-06 RX ORDER — ROSUVASTATIN CALCIUM 5 MG/1
5 TABLET, COATED ORAL DAILY
Qty: 90 TABLET | Refills: 3 | Status: SHIPPED | OUTPATIENT
Start: 2021-10-06 | End: 2022-12-08 | Stop reason: SDUPTHER

## 2021-10-06 RX ORDER — ALBUTEROL SULFATE 90 UG/1
2 AEROSOL, METERED RESPIRATORY (INHALATION) EVERY 4 HOURS PRN
Qty: 18 G | Refills: 3 | Status: SHIPPED | OUTPATIENT
Start: 2021-10-06 | End: 2022-11-09 | Stop reason: SDUPTHER

## 2021-10-06 NOTE — PROGRESS NOTES
"Chief Complaint  Hypertension (4 month Follow Up)    Subjective          Marcia Ulloa presents to [facility]  History of Present Illness    Patient's past medical history having essential hypertension.  Is currently on hydrochlorothiazide 25 mg daily.  She denies any side effects of the medication.    Patient also with past medical history of having hyperlipidemia.  She is currently on Crestor 5 mg daily.  She denies any side effects of the medication.    Objective   Vital Signs:   /78   Pulse 74   Ht 170.2 cm (67.01\")   Wt 73.4 kg (161 lb 12.8 oz)   SpO2 96%   BMI 25.33 kg/m²     Physical Exam  Vitals and nursing note reviewed.   Constitutional:       Appearance: She is well-developed.   HENT:      Head: Normocephalic and atraumatic.   Musculoskeletal:      Cervical back: Normal range of motion and neck supple.   Neurological:      Mental Status: She is alert and oriented to person, place, and time.   Psychiatric:         Behavior: Behavior normal.        Result Review :     Common labs    Common Labsle 6/9/21 6/9/21 6/9/21 6/9/21    1138 1138 1138 1138   Glucose   112 (A)    BUN   20    Creatinine   1.14 (A)    eGFR Non  Am   46 (A)    Sodium   143    Potassium   4.0    Chloride   100    Calcium   10.1    Albumin   4.40    Total Bilirubin   0.7    Alkaline Phosphatase   90    AST (SGOT)   25    ALT (SGPT)   20    WBC    8.92   Hemoglobin    15.0   Hematocrit    45.2   Platelets    294   Total Cholesterol 158      Triglycerides 145      HDL Cholesterol 57      LDL Cholesterol  76      Hemoglobin A1C  5.70 (A)     (A) Abnormal value                      Assessment and Plan    Diagnoses and all orders for this visit:    1. Essential hypertension (Primary)  -     Comprehensive Metabolic Panel  -     hydroCHLOROthiazide (HYDRODIURIL) 25 MG tablet; Take 1 tablet by mouth Daily.  Dispense: 90 tablet; Refill: 3  -     Stable, continue hctz.     2. Other hyperlipidemia  -  "    Lipid Panel With LDL / HDL Ratio  -     rosuvastatin (CRESTOR) 5 MG tablet; Take 1 tablet by mouth Daily.  Dispense: 90 tablet; Refill: 3  -     Will continue crestor 5mg daily.           Follow Up   No follow-ups on file.  Patient was given instructions and counseling regarding her condition or for health maintenance advice. Please see specific information pulled into the AVS if appropriate.

## 2022-05-09 ENCOUNTER — OFFICE VISIT (OUTPATIENT)
Dept: INTERNAL MEDICINE | Facility: CLINIC | Age: 77
End: 2022-05-09

## 2022-05-09 VITALS
HEIGHT: 67 IN | WEIGHT: 159 LBS | DIASTOLIC BLOOD PRESSURE: 81 MMHG | BODY MASS INDEX: 24.96 KG/M2 | SYSTOLIC BLOOD PRESSURE: 120 MMHG | HEART RATE: 98 BPM | OXYGEN SATURATION: 100 %

## 2022-05-09 DIAGNOSIS — Z00.00 HEALTHCARE MAINTENANCE: ICD-10-CM

## 2022-05-09 DIAGNOSIS — M17.11 OSTEOARTHRITIS OF RIGHT KNEE, UNSPECIFIED OSTEOARTHRITIS TYPE: ICD-10-CM

## 2022-05-09 DIAGNOSIS — Z01.818 PREOP EXAMINATION: Primary | ICD-10-CM

## 2022-05-09 PROCEDURE — 99213 OFFICE O/P EST LOW 20 MIN: CPT | Performed by: FAMILY MEDICINE

## 2022-05-09 NOTE — PROGRESS NOTES
"Chief Complaint  pre op questions    Subjective          Marcia Ulloa presents to [facility]  History of Present Illness    Miss Ulloa presents today with pre-op questions.    Pre-op questions  The patient reports that she is going to have her right knee replaced by Dr. Stafford. She reports that she was given a knee injection which caused her bruising. She has paperwork from Dr. Stafford. The patient notes that her right leg is longer than her left leg. She reports that when she was in [place] in 04/2021 and had gel put in her knee 4 times per week and notes that it has failed.    Objective   Vital Signs:   /81 (BP Location: Left arm, Patient Position: Sitting, Cuff Size: Adult)   Pulse 98   Ht 170.2 cm (67\")   Wt 72.1 kg (159 lb)   SpO2 100%   BMI 24.90 kg/m²     Physical Exam  Vitals and nursing note reviewed.   Constitutional:       Appearance: She is well-developed.   HENT:      Head: Normocephalic and atraumatic.   Musculoskeletal:      Cervical back: Normal range of motion and neck supple.   Neurological:      Mental Status: She is alert and oriented to person, place, and time.   Psychiatric:         Behavior: Behavior normal.        Result Review :                 Assessment and Plan    Diagnoses and all orders for this visit:    1. Preop examination (Primary)  -     CBC & Differential  -     Comprehensive Metabolic Panel  -     Will evaluate in couple weeks, will get ekg done then.     2. Osteoarthritis of right knee, unspecified osteoarthritis type  -     CBC & Differential  -     Comprehensive Metabolic Panel  -     Assuming her labs and ekg are fine, she should go through with the knee surgery.    3. Healthcare maintenance  -     CBC & Differential  -     Comprehensive Metabolic Panel      I will see her back on 05/31/2022. She will have blood work done and an EKG at that time.       Follow Up   No follow-ups on file.  Patient was given instructions and counseling " regarding her condition or for health maintenance advice. Please see specific information pulled into the AVS if appropriate.         Transcribed from ambient dictation for Bk Mendez MD by Alessia Donald.  05/09/22   16:00 EDT    Patient verbalized consent to the visit recording.

## 2022-05-27 ENCOUNTER — TELEPHONE (OUTPATIENT)
Dept: INTERNAL MEDICINE | Facility: CLINIC | Age: 77
End: 2022-05-27

## 2022-05-27 NOTE — TELEPHONE ENCOUNTER
Caller: Marcia Ulloa    Relationship: Self    Best call back number: [phone]    What is the best time to reach you: ANY    Who are you requesting to speak with (clinical staff, provider,  specific staff member): CLINICAL    What was the call regarding: PATIENT HAD LABS DONE TODAY AT [facility] TODAY FOR A SURGERY AND THEY ARE FAXING THE RESULTS FOR HER APPOINTMENT WITH DR. ARROYO    PATIENT IS WANTING TO KNOW IF THOSE RESULTS ARE GOOD FOR HER NEXT APPOINTMENT. PLEASE ADVISE.     Do you require a callback: YES

## 2022-05-27 NOTE — TELEPHONE ENCOUNTER
I have not seen the results yet.  But I am sure that should suffice.  We will hopefully get these results before her appointment, and we can discuss it at her appointment.

## 2022-05-31 ENCOUNTER — OFFICE VISIT (OUTPATIENT)
Dept: INTERNAL MEDICINE | Facility: CLINIC | Age: 77
End: 2022-05-31

## 2022-05-31 ENCOUNTER — LAB (OUTPATIENT)
Dept: LAB | Facility: HOSPITAL | Age: 77
End: 2022-05-31

## 2022-05-31 VITALS
HEART RATE: 71 BPM | HEIGHT: 67 IN | DIASTOLIC BLOOD PRESSURE: 78 MMHG | SYSTOLIC BLOOD PRESSURE: 123 MMHG | WEIGHT: 158 LBS | BODY MASS INDEX: 24.8 KG/M2 | OXYGEN SATURATION: 97 %

## 2022-05-31 DIAGNOSIS — R73.03 PREDIABETES: ICD-10-CM

## 2022-05-31 DIAGNOSIS — R79.89 ELEVATED SERUM CREATININE: ICD-10-CM

## 2022-05-31 DIAGNOSIS — Z00.00 HEALTHCARE MAINTENANCE: ICD-10-CM

## 2022-05-31 DIAGNOSIS — M17.11 OSTEOARTHRITIS OF RIGHT KNEE, UNSPECIFIED OSTEOARTHRITIS TYPE: Primary | ICD-10-CM

## 2022-05-31 LAB
ALBUMIN SERPL-MCNC: 4.4 G/DL (ref 3.5–5.2)
ALBUMIN/GLOB SERPL: 1.7 G/DL
ALP SERPL-CCNC: 80 U/L (ref 39–117)
ALT SERPL W P-5'-P-CCNC: 21 U/L (ref 1–33)
ANION GAP SERPL CALCULATED.3IONS-SCNC: 11 MMOL/L (ref 5–15)
AST SERPL-CCNC: 18 U/L (ref 1–32)
BASOPHILS # BLD AUTO: 0.06 10*3/MM3 (ref 0–0.2)
BASOPHILS NFR BLD AUTO: 0.7 % (ref 0–1.5)
BILIRUB SERPL-MCNC: 0.5 MG/DL (ref 0–1.2)
BUN SERPL-MCNC: 19 MG/DL (ref 8–23)
BUN/CREAT SERPL: 16.5 (ref 7–25)
CALCIUM SPEC-SCNC: 10.3 MG/DL (ref 8.6–10.5)
CHLORIDE SERPL-SCNC: 100 MMOL/L (ref 98–107)
CHOLEST SERPL-MCNC: 144 MG/DL (ref 0–200)
CO2 SERPL-SCNC: 29 MMOL/L (ref 22–29)
CREAT SERPL-MCNC: 1.15 MG/DL (ref 0.57–1)
DEPRECATED RDW RBC AUTO: 49.7 FL (ref 37–54)
EGFRCR SERPLBLD CKD-EPI 2021: 49.5 ML/MIN/1.73
EOSINOPHIL # BLD AUTO: 0.32 10*3/MM3 (ref 0–0.4)
EOSINOPHIL NFR BLD AUTO: 3.8 % (ref 0.3–6.2)
ERYTHROCYTE [DISTWIDTH] IN BLOOD BY AUTOMATED COUNT: 14 % (ref 12.3–15.4)
GLOBULIN UR ELPH-MCNC: 2.6 GM/DL
GLUCOSE SERPL-MCNC: 110 MG/DL (ref 65–99)
HBA1C MFR BLD: 6.4 % (ref 4.8–5.6)
HCT VFR BLD AUTO: 41.1 % (ref 34–46.6)
HDLC SERPL-MCNC: 51 MG/DL (ref 40–60)
HGB BLD-MCNC: 13.7 G/DL (ref 12–15.9)
IMM GRANULOCYTES # BLD AUTO: 0.03 10*3/MM3 (ref 0–0.05)
IMM GRANULOCYTES NFR BLD AUTO: 0.4 % (ref 0–0.5)
LDLC SERPL CALC-MCNC: 71 MG/DL (ref 0–100)
LDLC/HDLC SERPL: 1.33 {RATIO}
LYMPHOCYTES # BLD AUTO: 1.65 10*3/MM3 (ref 0.7–3.1)
LYMPHOCYTES NFR BLD AUTO: 19.8 % (ref 19.6–45.3)
MCH RBC QN AUTO: 32 PG (ref 26.6–33)
MCHC RBC AUTO-ENTMCNC: 33.3 G/DL (ref 31.5–35.7)
MCV RBC AUTO: 96 FL (ref 79–97)
MONOCYTES # BLD AUTO: 0.92 10*3/MM3 (ref 0.1–0.9)
MONOCYTES NFR BLD AUTO: 11 % (ref 5–12)
NEUTROPHILS NFR BLD AUTO: 5.36 10*3/MM3 (ref 1.7–7)
NEUTROPHILS NFR BLD AUTO: 64.3 % (ref 42.7–76)
NRBC BLD AUTO-RTO: 0 /100 WBC (ref 0–0.2)
PLATELET # BLD AUTO: 279 10*3/MM3 (ref 140–450)
PMV BLD AUTO: 11.1 FL (ref 6–12)
POTASSIUM SERPL-SCNC: 3.5 MMOL/L (ref 3.5–5.2)
PROT SERPL-MCNC: 7 G/DL (ref 6–8.5)
RBC # BLD AUTO: 4.28 10*6/MM3 (ref 3.77–5.28)
SODIUM SERPL-SCNC: 140 MMOL/L (ref 136–145)
T-UPTAKE NFR SERPL: 1.01 TBI (ref 0.8–1.3)
T4 SERPL-MCNC: 6.43 MCG/DL (ref 4.5–11.7)
TRIGL SERPL-MCNC: 126 MG/DL (ref 0–150)
TSH SERPL DL<=0.05 MIU/L-ACNC: 2.81 UIU/ML (ref 0.27–4.2)
VLDLC SERPL-MCNC: 22 MG/DL (ref 5–40)
WBC NRBC COR # BLD: 8.34 10*3/MM3 (ref 3.4–10.8)

## 2022-05-31 PROCEDURE — 99214 OFFICE O/P EST MOD 30 MIN: CPT | Performed by: FAMILY MEDICINE

## 2022-05-31 PROCEDURE — 80061 LIPID PANEL: CPT

## 2022-05-31 PROCEDURE — 83036 HEMOGLOBIN GLYCOSYLATED A1C: CPT

## 2022-05-31 PROCEDURE — 84479 ASSAY OF THYROID (T3 OR T4): CPT

## 2022-05-31 PROCEDURE — 80053 COMPREHEN METABOLIC PANEL: CPT

## 2022-05-31 PROCEDURE — 36415 COLL VENOUS BLD VENIPUNCTURE: CPT

## 2022-05-31 PROCEDURE — 84436 ASSAY OF TOTAL THYROXINE: CPT

## 2022-05-31 PROCEDURE — 84443 ASSAY THYROID STIM HORMONE: CPT

## 2022-05-31 PROCEDURE — 85025 COMPLETE CBC W/AUTO DIFF WBC: CPT

## 2022-05-31 RX ORDER — POTASSIUM CHLORIDE 20 MEQ/1
20 TABLET, EXTENDED RELEASE ORAL DAILY
Qty: 3 TABLET | Refills: 0 | Status: SHIPPED | OUTPATIENT
Start: 2022-05-31

## 2022-06-01 NOTE — PROGRESS NOTES
The labs were reviewed. Please inform patient that labs were normal.  She is prediabetic.  I have discussed these labs with patient at her office visit.

## 2022-08-04 ENCOUNTER — TRANSCRIBE ORDERS (OUTPATIENT)
Dept: ADMINISTRATIVE | Facility: HOSPITAL | Age: 77
End: 2022-08-04

## 2022-08-04 DIAGNOSIS — Z12.31 SCREENING MAMMOGRAM FOR BREAST CANCER: Primary | ICD-10-CM

## 2022-08-11 ENCOUNTER — HOSPITAL ENCOUNTER (OUTPATIENT)
Dept: MAMMOGRAPHY | Facility: HOSPITAL | Age: 77
Discharge: HOME | End: 2022-08-11
Admitting: FAMILY MEDICINE

## 2022-08-11 DIAGNOSIS — Z12.31 SCREENING MAMMOGRAM FOR BREAST CANCER: ICD-10-CM

## 2022-08-11 PROCEDURE — 77067 SCR MAMMO BI INCL CAD: CPT

## 2022-08-11 PROCEDURE — 77063 BREAST TOMOSYNTHESIS BI: CPT

## 2022-08-25 NOTE — PROGRESS NOTES
IMPRESSION:  There are no findings suspicious for malignancy in the left  breast or right TRAM flap. Routine follow-up mammography is recommended.    BI-RADS CATEGORY 1: Negative.

## 2022-10-11 DIAGNOSIS — I10 ESSENTIAL HYPERTENSION: ICD-10-CM

## 2022-10-11 RX ORDER — HYDROCHLOROTHIAZIDE 25 MG/1
TABLET ORAL
Qty: 90 TABLET | Refills: 3 | Status: SHIPPED | OUTPATIENT
Start: 2022-10-11 | End: 2022-12-08 | Stop reason: SDUPTHER

## 2022-10-19 ENCOUNTER — OFFICE VISIT (OUTPATIENT)
Dept: INTERNAL MEDICINE | Facility: CLINIC | Age: 77
End: 2022-10-19

## 2022-10-19 VITALS
TEMPERATURE: 96.9 F | HEIGHT: 67 IN | SYSTOLIC BLOOD PRESSURE: 130 MMHG | HEART RATE: 96 BPM | WEIGHT: 154 LBS | OXYGEN SATURATION: 97 % | BODY MASS INDEX: 24.17 KG/M2 | DIASTOLIC BLOOD PRESSURE: 80 MMHG

## 2022-10-19 DIAGNOSIS — R73.03 PREDIABETES: ICD-10-CM

## 2022-10-19 DIAGNOSIS — I10 ESSENTIAL HYPERTENSION: Primary | ICD-10-CM

## 2022-10-19 DIAGNOSIS — E78.49 OTHER HYPERLIPIDEMIA: ICD-10-CM

## 2022-10-19 PROCEDURE — 99214 OFFICE O/P EST MOD 30 MIN: CPT | Performed by: FAMILY MEDICINE

## 2022-10-22 NOTE — PROGRESS NOTES
"Chief Complaint  No chief complaint on file.    Subjective        Marcia Ulloa presents to [facility]  History of Present Illness    Patient is a history having essential hypertension.  She currently is hydrochlorothiazide 25 mg daily.  She denies any side effects of the medication.  Blood pressure today is 130/80.    Patient also has a past medical history having hyperlipidemia.  She currently Crestor 5 mg daily.  She denies any side effects of the medication.    She also has prediabetes, where she is started diet and exercise.    Objective   Vital Signs:  /80   Pulse 96   Temp 96.9 °F (36.1 °C)   Ht 170.2 cm (67.01\")   Wt 69.9 kg (154 lb)   SpO2 97%   BMI 24.11 kg/m²   Estimated body mass index is 24.11 kg/m² as calculated from the following:    Height as of this encounter: 170.2 cm (67.01\").    Weight as of this encounter: 69.9 kg (154 lb).    BMI is within normal parameters. No other follow-up for BMI required.      Physical Exam  Vitals and nursing note reviewed.   Constitutional:       Appearance: She is well-developed.   HENT:      Head: Normocephalic and atraumatic.   Musculoskeletal:      Cervical back: Normal range of motion and neck supple.   Neurological:      Mental Status: She is alert and oriented to person, place, and time.   Psychiatric:         Behavior: Behavior normal.        Result Review :                Assessment and Plan   Diagnoses and all orders for this visit:    1. Essential hypertension (Primary)  -     Comprehensive Metabolic Panel  -     CBC & Differential    2. Prediabetes  -     Hemoglobin A1c    3. Other hyperlipidemia  -     Lipid Panel With LDL / HDL Ratio      For the prediabetes continue diet and exercise.  For essential hypertension, continue hydrochlorothiazide 25 mg daily.  For her hyperlipidemia, continue Crestor 5 mg daily.       Follow Up   No follow-ups on file.  Patient was given instructions and counseling regarding her " condition or for health maintenance advice. Please see specific information pulled into the AVS if appropriate.

## 2022-11-09 ENCOUNTER — OFFICE VISIT (OUTPATIENT)
Dept: INTERNAL MEDICINE | Facility: CLINIC | Age: 77
End: 2022-11-09

## 2022-11-09 ENCOUNTER — LAB (OUTPATIENT)
Dept: LAB | Facility: HOSPITAL | Age: 77
End: 2022-11-09

## 2022-11-09 VITALS
BODY MASS INDEX: 23.54 KG/M2 | WEIGHT: 150 LBS | OXYGEN SATURATION: 94 % | DIASTOLIC BLOOD PRESSURE: 80 MMHG | SYSTOLIC BLOOD PRESSURE: 122 MMHG | HEIGHT: 67 IN | HEART RATE: 86 BPM

## 2022-11-09 DIAGNOSIS — R06.2 WHEEZING: ICD-10-CM

## 2022-11-09 DIAGNOSIS — Z00.00 WELL WOMAN EXAM (NO GYNECOLOGICAL EXAM): Primary | ICD-10-CM

## 2022-11-09 DIAGNOSIS — Z00.00 MEDICARE ANNUAL WELLNESS VISIT, SUBSEQUENT: ICD-10-CM

## 2022-11-09 DIAGNOSIS — Z00.00 HEALTHCARE MAINTENANCE: ICD-10-CM

## 2022-11-09 LAB
ALBUMIN SERPL-MCNC: 4 G/DL (ref 3.5–5.2)
ALBUMIN/GLOB SERPL: 1.2 G/DL
ALP SERPL-CCNC: 79 U/L (ref 39–117)
ALT SERPL W P-5'-P-CCNC: 17 U/L (ref 1–33)
ANION GAP SERPL CALCULATED.3IONS-SCNC: 11.4 MMOL/L (ref 5–15)
AST SERPL-CCNC: 23 U/L (ref 1–32)
BASOPHILS # BLD AUTO: 0.1 10*3/MM3 (ref 0–0.2)
BASOPHILS NFR BLD AUTO: 1 % (ref 0–1.5)
BILIRUB SERPL-MCNC: 0.4 MG/DL (ref 0–1.2)
BUN SERPL-MCNC: 20 MG/DL (ref 8–23)
BUN/CREAT SERPL: 16.1 (ref 7–25)
CALCIUM SPEC-SCNC: 10.2 MG/DL (ref 8.6–10.5)
CHLORIDE SERPL-SCNC: 101 MMOL/L (ref 98–107)
CHOLEST SERPL-MCNC: 131 MG/DL (ref 0–200)
CO2 SERPL-SCNC: 28.6 MMOL/L (ref 22–29)
CREAT SERPL-MCNC: 1.24 MG/DL (ref 0.57–1)
DEPRECATED RDW RBC AUTO: 44.5 FL (ref 37–54)
EGFRCR SERPLBLD CKD-EPI 2021: 45.2 ML/MIN/1.73
EOSINOPHIL # BLD AUTO: 0.35 10*3/MM3 (ref 0–0.4)
EOSINOPHIL NFR BLD AUTO: 3.4 % (ref 0.3–6.2)
ERYTHROCYTE [DISTWIDTH] IN BLOOD BY AUTOMATED COUNT: 13.7 % (ref 12.3–15.4)
GLOBULIN UR ELPH-MCNC: 3.3 GM/DL
GLUCOSE SERPL-MCNC: 98 MG/DL (ref 65–99)
HBA1C MFR BLD: 6.2 % (ref 4.8–5.6)
HCT VFR BLD AUTO: 37.6 % (ref 34–46.6)
HDLC SERPL-MCNC: 52 MG/DL (ref 40–60)
HGB BLD-MCNC: 12.9 G/DL (ref 12–15.9)
IMM GRANULOCYTES # BLD AUTO: 0.03 10*3/MM3 (ref 0–0.05)
IMM GRANULOCYTES NFR BLD AUTO: 0.3 % (ref 0–0.5)
LDLC SERPL CALC-MCNC: 60 MG/DL (ref 0–100)
LDLC/HDLC SERPL: 1.12 {RATIO}
LYMPHOCYTES # BLD AUTO: 1.53 10*3/MM3 (ref 0.7–3.1)
LYMPHOCYTES NFR BLD AUTO: 14.9 % (ref 19.6–45.3)
MCH RBC QN AUTO: 30.7 PG (ref 26.6–33)
MCHC RBC AUTO-ENTMCNC: 34.3 G/DL (ref 31.5–35.7)
MCV RBC AUTO: 89.5 FL (ref 79–97)
MONOCYTES # BLD AUTO: 0.96 10*3/MM3 (ref 0.1–0.9)
MONOCYTES NFR BLD AUTO: 9.4 % (ref 5–12)
NEUTROPHILS NFR BLD AUTO: 7.27 10*3/MM3 (ref 1.7–7)
NEUTROPHILS NFR BLD AUTO: 71 % (ref 42.7–76)
NRBC BLD AUTO-RTO: 0 /100 WBC (ref 0–0.2)
PLATELET # BLD AUTO: 322 10*3/MM3 (ref 140–450)
PMV BLD AUTO: 10.9 FL (ref 6–12)
POTASSIUM SERPL-SCNC: 3.7 MMOL/L (ref 3.5–5.2)
PROT SERPL-MCNC: 7.3 G/DL (ref 6–8.5)
RBC # BLD AUTO: 4.2 10*6/MM3 (ref 3.77–5.28)
SODIUM SERPL-SCNC: 141 MMOL/L (ref 136–145)
TRIGL SERPL-MCNC: 103 MG/DL (ref 0–150)
VLDLC SERPL-MCNC: 19 MG/DL (ref 5–40)
WBC NRBC COR # BLD: 10.24 10*3/MM3 (ref 3.4–10.8)

## 2022-11-09 PROCEDURE — 36415 COLL VENOUS BLD VENIPUNCTURE: CPT | Performed by: FAMILY MEDICINE

## 2022-11-09 PROCEDURE — 1159F MED LIST DOCD IN RCRD: CPT | Performed by: FAMILY MEDICINE

## 2022-11-09 PROCEDURE — 1170F FXNL STATUS ASSESSED: CPT | Performed by: FAMILY MEDICINE

## 2022-11-09 PROCEDURE — 80061 LIPID PANEL: CPT | Performed by: FAMILY MEDICINE

## 2022-11-09 PROCEDURE — 83036 HEMOGLOBIN GLYCOSYLATED A1C: CPT | Performed by: FAMILY MEDICINE

## 2022-11-09 PROCEDURE — 85025 COMPLETE CBC W/AUTO DIFF WBC: CPT | Performed by: FAMILY MEDICINE

## 2022-11-09 PROCEDURE — G0439 PPPS, SUBSEQ VISIT: HCPCS | Performed by: FAMILY MEDICINE

## 2022-11-09 PROCEDURE — 96160 PT-FOCUSED HLTH RISK ASSMT: CPT | Performed by: FAMILY MEDICINE

## 2022-11-09 PROCEDURE — 99397 PER PM REEVAL EST PAT 65+ YR: CPT | Performed by: FAMILY MEDICINE

## 2022-11-09 PROCEDURE — 80053 COMPREHEN METABOLIC PANEL: CPT | Performed by: FAMILY MEDICINE

## 2022-11-09 RX ORDER — ALBUTEROL SULFATE 90 UG/1
2 AEROSOL, METERED RESPIRATORY (INHALATION) EVERY 4 HOURS PRN
Qty: 18 G | Refills: 3 | Status: SHIPPED | OUTPATIENT
Start: 2022-11-09

## 2022-11-09 NOTE — PROGRESS NOTES
The ABCs of the Annual Wellness Visit  Subsequent Medicare Wellness Visit    Chief Complaint   Patient presents with   • follow up lab work      Subjective    History of Present Illness:  Marcia Ulloa is a 77 y.o. female who presents for a Subsequent Medicare Wellness Visit.    The following portions of the patient's history were reviewed and   updated as appropriate: allergies, current medications, past family history, past medical history, past social history, past surgical history and problem list.    Compared to one year ago, the patient feels her physical   health is better.    Compared to one year ago, the patient feels her mental   health is the same.    Recent Hospitalizations:  She was not admitted to the hospital during the last year.       Current Medical Providers:  Patient Care Team:  Bk Mendez MD as PCP - General (Family Medicine)  Senia Frederick MD as Consulting Physician (Obstetrics and Gynecology)  Thaddeus Stafford MD as Consulting Physician (Orthopedic Surgery)  Elayne Paulino MD (Inactive) as Consulting Physician (Gynecology)  Hunter Benito DC (Chiropractic Medicine)  Shahrzad Maza MD as Consulting Physician (Ophthalmology)  Kostas Loomis MD (Dermatology)    Outpatient Medications Prior to Visit   Medication Sig Dispense Refill   • albuterol sulfate  (90 Base) MCG/ACT inhaler Inhale 2 puffs Every 4 (Four) Hours As Needed for Wheezing or Shortness of Air. 18 g 3   • aspirin 81 MG EC tablet Take 81 mg by mouth Daily. HELD     • Calcium Carbonate-Vitamin D (CALCIUM-CARB 600 + D PO) Take 1 tablet by mouth Daily.     • CRANBERRY PO Take 1 tablet by mouth Daily.     • hydroCHLOROthiazide (HYDRODIURIL) 25 MG tablet TAKE 1 TABLET BY MOUTH EVERY DAY 90 tablet 3   • melatonin 3 MG tablet Take 3 mg by mouth At Night As Needed for Sleep.     • Multiple Vitamin (MULTI VITAMIN DAILY PO) Take 1 tablet by mouth Daily.     • potassium chloride (K-DUR,KLOR-CON) 20 MEQ CR  "tablet Take 1 tablet by mouth Daily. 3 tablet 0   • rosuvastatin (CRESTOR) 5 MG tablet Take 1 tablet by mouth Daily. 90 tablet 3   • TURMERIC PO Take 1 tablet by mouth Daily. HELD       No facility-administered medications prior to visit.       No opioid medication identified on active medication list. I have reviewed chart for other potential  high risk medication/s and harmful drug interactions in the elderly.          Aspirin is on active medication list. Aspirin use is indicated based on review of current medical condition/s. Pros and cons of this therapy have been discussed today. Benefits of this medication outweigh potential harm.  Patient has been encouraged to continue taking this medication.  .      Patient Active Problem List   Diagnosis   • Essential hypertension   • Other hyperlipidemia   • Primary osteoarthritis of knee   • Elevated serum creatinine   • Acquired absence of breast and absent nipple   • Asymptomatic postmenopausal status   • Breast cancer, right breast (HCC)   • Osteoarthritis of hip   • Personal history of breast cancer   • Uterovaginal prolapse, incomplete   • Cystocele, midline   • Cystocele, lateral   • Vaginal enterocele, congenital or acquired   • Loss of perineal body, female   • Female stress incontinence   • Incomplete defecation   • Incompetence or weakening of pubocervical tissue   • Incompetence or weakening of rectovaginal tissue   • Personal history of endometriosis     Advance Care Planning  Advance Directive is on file.  ACP discussion was held with the patient during this visit. Patient has an advance directive in EMR which is still valid.           Objective    Vitals:    11/09/22 0740   BP: 122/80   Pulse: 86   SpO2: 94%   Weight: 68 kg (150 lb)   Height: 170.2 cm (67.01\")     Estimated body mass index is 23.49 kg/m² as calculated from the following:    Height as of this encounter: 170.2 cm (67.01\").    Weight as of this encounter: 68 kg (150 lb).    BMI is within " normal parameters. No other follow-up for BMI required.      Does the patient have evidence of cognitive impairment? No    Physical Exam            HEALTH RISK ASSESSMENT    Smoking Status:  Social History     Tobacco Use   Smoking Status Never   Smokeless Tobacco Never     Alcohol Consumption:  Social History     Substance and Sexual Activity   Alcohol Use Yes    Comment: 1/day     Fall Risk Screen:    LEONARDO Fall Risk Assessment was completed, and patient is at LOW risk for falls.Assessment completed on:10/19/2022    Depression Screening:  PHQ-2/PHQ-9 Depression Screening 10/19/2022   Retired PHQ-9 Total Score -   Retired Total Score -   Little Interest or Pleasure in Doing Things 0-->not at all   Feeling Down, Depressed or Hopeless 0-->not at all   Trouble Falling or Staying Asleep, or Sleeping Too Much 1-->several days   Feeling Tired or Having Little Energy 1-->several days   Poor Appetite or Overeating 0-->not at all   Feeling Bad about Yourself - or that You are a Failure or Have Let Yourself or Your Family Down 0-->not at all   Trouble Concentrating on Things, Such as Reading the Newspaper or Watching Television 1-->several days   Moving or Speaking So Slowly that Other People Could Have Noticed? Or the Opposite - Being So Fidgety 0-->not at all   Thoughts that You Would be Better Off Dead or of Hurting Yourself in Some Way 0-->not at all   PHQ-9: Brief Depression Severity Measure Score 3   If You Checked Off Any Problems, How Difficult Have These Problems Made It For You to Do Your Work, Take Care of Things at Home, or Get Along with Other People? not difficult at all       Health Habits and Functional and Cognitive Screening:  Functional & Cognitive Status 10/19/2022   Do you have difficulty preparing food and eating? No   Do you have difficulty bathing yourself, getting dressed or grooming yourself? No   Do you have difficulty using the toilet? No   Do you have difficulty moving around from place to  place? No   Do you have trouble with steps or getting out of a bed or a chair? No   Current Diet Well Balanced Diet   Dental Exam Up to date   Eye Exam Not up to date   Exercise (times per week) 5 times per week   Current Exercises Include Walking;Swimming   Current Exercise Activities Include -   Do you need help using the phone?  No   Are you deaf or do you have serious difficulty hearing?  No   Do you need help with transportation? No   Do you need help shopping? No   Do you need help preparing meals?  No   Do you need help with housework?  No   Do you need help with laundry? No   Do you need help taking your medications? No   Do you need help managing money? No   Do you ever drive or ride in a car without wearing a seat belt? No   Have you felt unusual stress, anger or loneliness in the last month? No   Who do you live with? Spouse   If you need help, do you have trouble finding someone available to you? No   Have you been bothered in the last four weeks by sexual problems? No   Do you have difficulty concentrating, remembering or making decisions? No       Age-appropriate Screening Schedule:  Refer to the list below for future screening recommendations based on patient's age, sex and/or medical conditions. Orders for these recommended tests are listed in the plan section. The patient has been provided with a written plan.    Health Maintenance   Topic Date Due   • ZOSTER VACCINE (2 of 2) 01/26/2021   • LIPID PANEL  05/31/2023   • DXA SCAN  08/13/2023   • MAMMOGRAM  08/11/2024   • TDAP/TD VACCINES (3 - Td or Tdap) 01/02/2029   • INFLUENZA VACCINE  Completed              Assessment & Plan   CMS Preventative Services Quick Reference  Risk Factors Identified During Encounter  None Identified  The above risks/problems have been discussed with the patient.  Follow up actions/plans if indicated are seen below in the Assessment/Plan Section.  Pertinent information has been shared with the patient in the After Visit  Summary.    Diagnoses and all orders for this visit:    1. Well woman exam (no gynecological exam) (Primary)    2. Medicare annual wellness visit, subsequent    3. Healthcare maintenance  -     CBC & Differential; Future  -     Comprehensive Metabolic Panel; Future  -     Hemoglobin A1c; Future  -     Thyroid Panel With TSH; Future  -     Lipid Panel With LDL / HDL Ratio; Future        Follow Up:   No follow-ups on file.     An After Visit Summary and PPPS were made available to the patient.

## 2022-11-09 NOTE — PROGRESS NOTES
Subjective   Marcia Ulloa is a 77 y.o. female and is here for a comprehensive physical exam. The patient reports no problems.    Pt is UTD with annual gyn exam and mammo           Social History:   Social History     Socioeconomic History   • Marital status:    Tobacco Use   • Smoking status: Never   • Smokeless tobacco: Never   Substance and Sexual Activity   • Alcohol use: Yes     Comment: 1/day   • Drug use: No   • Sexual activity: Never       Family History:   Family History   Problem Relation Age of Onset   • COPD Mother    • Heart failure Mother    • Hypertension Mother    • Heart disease Mother    • Depression Mother    • Vision loss Mother    • Heart failure Father    • Hypertension Father    • Heart disease Father    • Vision loss Maternal Grandfather    • Malig Hyperthermia Neg Hx    • Breast cancer Neg Hx    • Ovarian cancer Neg Hx        Past Medical History:   Past Medical History:   Diagnosis Date   • Breast cancer (HCC) 2010    Rt   • Drug therapy     femara 5-7 years.   • Eczema    • History of shingles    • Hyperlipidemia    • Hypertension    • Knee pain    • Prolapse of female bladder, acquired    • Seasonal allergies    • Slow to wake up after anesthesia    • SOB (shortness of breath) on exertion    • Urinary incontinence        The following portions of the patient's history were reviewed and updated as appropriate: allergies, current medications, past family history, past medical history, past social history, past surgical history and problem list.    Review of Systems    Review of Systems   Constitutional: Negative for chills and fever.   HENT: Negative for congestion, rhinorrhea, sinus pain and sore throat.    Eyes: Negative for photophobia and visual disturbance.   Respiratory: Negative for cough, chest tightness and shortness of breath.    Cardiovascular: Negative for chest pain and palpitations.   Gastrointestinal: Negative for diarrhea, nausea and vomiting.   Genitourinary:  Negative for dysuria, frequency and urgency.   Skin: Negative for rash and wound.   Neurological: Negative for dizziness and syncope.   Psychiatric/Behavioral: Negative for behavioral problems and confusion.       Objective   Physical Exam  Vitals and nursing note reviewed.   Constitutional:       Appearance: She is well-developed.   HENT:      Head: Normocephalic and atraumatic.      Right Ear: External ear normal.      Left Ear: External ear normal.   Cardiovascular:      Rate and Rhythm: Normal rate and regular rhythm.      Heart sounds: Normal heart sounds.   Pulmonary:      Effort: Pulmonary effort is normal. No respiratory distress.      Breath sounds: Normal breath sounds.   Abdominal:      Palpations: Abdomen is soft.      Tenderness: There is no abdominal tenderness. There is no guarding.   Musculoskeletal:         General: Normal range of motion.      Cervical back: Normal range of motion and neck supple.   Lymphadenopathy:      Cervical: No cervical adenopathy.   Skin:     General: Skin is warm.   Neurological:      Mental Status: She is alert and oriented to person, place, and time.   Psychiatric:         Behavior: Behavior normal.         Vitals:    11/09/22 0740   BP: 122/80   Pulse: 86   SpO2: 94%     Body mass index is 23.49 kg/m².      Medications:   Current Outpatient Medications:   •  albuterol sulfate  (90 Base) MCG/ACT inhaler, Inhale 2 puffs Every 4 (Four) Hours As Needed for Wheezing or Shortness of Air., Disp: 18 g, Rfl: 3  •  aspirin 81 MG EC tablet, Take 81 mg by mouth Daily. HELD, Disp: , Rfl:   •  Calcium Carbonate-Vitamin D (CALCIUM-CARB 600 + D PO), Take 1 tablet by mouth Daily., Disp: , Rfl:   •  CRANBERRY PO, Take 1 tablet by mouth Daily., Disp: , Rfl:   •  hydroCHLOROthiazide (HYDRODIURIL) 25 MG tablet, TAKE 1 TABLET BY MOUTH EVERY DAY, Disp: 90 tablet, Rfl: 3  •  melatonin 3 MG tablet, Take 3 mg by mouth At Night As Needed for Sleep., Disp: , Rfl:   •  Multiple Vitamin (MULTI  VITAMIN DAILY PO), Take 1 tablet by mouth Daily., Disp: , Rfl:   •  potassium chloride (K-DUR,KLOR-CON) 20 MEQ CR tablet, Take 1 tablet by mouth Daily., Disp: 3 tablet, Rfl: 0  •  rosuvastatin (CRESTOR) 5 MG tablet, Take 1 tablet by mouth Daily., Disp: 90 tablet, Rfl: 3  •  TURMERIC PO, Take 1 tablet by mouth Daily. HELD, Disp: , Rfl:        Assessment & Plan   Healthy female exam.      1. Healthcare Maintenance:  2. Patient Counseling:  --Nutrition: Stressed importance of moderation in sodium/caffeine intake, saturated fat and cholesterol, caloric balance, sufficient intake of fresh fruits, vegetables, fiber, calcium and vit D  --Exercise: Recommended 30 minutes of exercise daily.  --Immunizations reviewed.  --Discussed benefits of screening colonoscopy.    Diagnoses and all orders for this visit:    Well woman exam (no gynecological exam)    Medicare annual wellness visit, subsequent    Healthcare maintenance  -     CBC & Differential; Future  -     Comprehensive Metabolic Panel; Future  -     Hemoglobin A1c; Future  -     Thyroid Panel With TSH; Future  -     Lipid Panel With LDL / HDL Ratio; Future    Wheezing  -     albuterol sulfate  (90 Base) MCG/ACT inhaler; Inhale 2 puffs Every 4 (Four) Hours As Needed for Wheezing or Shortness of Air.        No follow-ups on file.             Dictated utilizing Dragon Voice Recognition Software

## 2022-11-10 DIAGNOSIS — R79.89 ELEVATED SERUM CREATININE: ICD-10-CM

## 2022-12-08 DIAGNOSIS — I10 ESSENTIAL HYPERTENSION: ICD-10-CM

## 2022-12-08 DIAGNOSIS — E78.49 OTHER HYPERLIPIDEMIA: ICD-10-CM

## 2022-12-08 RX ORDER — HYDROCHLOROTHIAZIDE 25 MG/1
25 TABLET ORAL DAILY
Qty: 90 TABLET | Refills: 3 | Status: SHIPPED | OUTPATIENT
Start: 2022-12-08

## 2022-12-08 RX ORDER — ROSUVASTATIN CALCIUM 5 MG/1
5 TABLET, COATED ORAL DAILY
Qty: 90 TABLET | Refills: 3 | Status: SHIPPED | OUTPATIENT
Start: 2022-12-08

## 2022-12-08 NOTE — TELEPHONE ENCOUNTER
Caller: Marcia Ulloa    Relationship: Self    Best call back number: [phone]    Requested Prescriptions:   Requested Prescriptions     Pending Prescriptions Disp Refills   • rosuvastatin (CRESTOR) 5 MG tablet 90 tablet 3     Sig: Take 1 tablet by mouth Daily.   • hydroCHLOROthiazide (HYDRODIURIL) 25 MG tablet 90 tablet 3     Sig: Take 1 tablet by mouth Daily.        Pharmacy where request should be sent: [id number] - [place], [place] - [address] [facility] [phone] [facility] [phone] FX     Additional details provided by patient: PATIENT IS IN [place] FOR THE WINTER, PLEASE SEND REFILLS TO [facility].     Does the patient have less than a 3 day supply:  [] Yes  [x] No    Would you like a call back once the refill request has been completed: [] Yes [x] No    If the office needs to give you a call back, can they leave a voicemail: [] Yes [x] No    Martina Galindo Rep   12/08/22 07:48 EST

## 2023-05-18 ENCOUNTER — APPOINTMENT (OUTPATIENT)
Dept: GENERAL RADIOLOGY | Facility: HOSPITAL | Age: 78
End: 2023-05-18
Payer: MEDICARE

## 2023-05-18 ENCOUNTER — HOSPITAL ENCOUNTER (OUTPATIENT)
Facility: HOSPITAL | Age: 78
Setting detail: OBSERVATION
Discharge: HOME | End: 2023-05-20
Attending: EMERGENCY MEDICINE | Admitting: HOSPITALIST
Payer: MEDICARE

## 2023-05-18 ENCOUNTER — APPOINTMENT (OUTPATIENT)
Dept: CT IMAGING | Facility: HOSPITAL | Age: 78
End: 2023-05-18
Payer: MEDICARE

## 2023-05-18 DIAGNOSIS — A41.9 SEPSIS WITHOUT ACUTE ORGAN DYSFUNCTION, DUE TO UNSPECIFIED ORGANISM: ICD-10-CM

## 2023-05-18 DIAGNOSIS — R55 RECURRENT SYNCOPE: Primary | ICD-10-CM

## 2023-05-18 DIAGNOSIS — R77.8 ELEVATED TROPONIN: ICD-10-CM

## 2023-05-18 DIAGNOSIS — N39.0 ACUTE UTI: ICD-10-CM

## 2023-05-18 PROBLEM — I12.9 BENIGN HYPERTENSIVE KIDNEY DISEASE: Status: ACTIVE | Noted: 2022-08-31

## 2023-05-18 PROBLEM — N18.30 STAGE 3 CHRONIC KIDNEY DISEASE: Status: ACTIVE | Noted: 2022-08-31

## 2023-05-18 LAB
ALBUMIN SERPL-MCNC: 4.4 G/DL (ref 3.5–5.2)
ALBUMIN/GLOB SERPL: 1.5 G/DL
ALP SERPL-CCNC: 86 U/L (ref 39–117)
ALT SERPL W P-5'-P-CCNC: 22 U/L (ref 1–33)
ANION GAP SERPL CALCULATED.3IONS-SCNC: 12.1 MMOL/L (ref 5–15)
APTT PPP: 22.8 SECONDS (ref 22.7–35.4)
AST SERPL-CCNC: 25 U/L (ref 1–32)
BACTERIA UR QL AUTO: ABNORMAL /HPF
BASOPHILS # BLD AUTO: 0.06 10*3/MM3 (ref 0–0.2)
BASOPHILS NFR BLD AUTO: 0.3 % (ref 0–1.5)
BILIRUB SERPL-MCNC: 0.8 MG/DL (ref 0–1.2)
BILIRUB UR QL STRIP: NEGATIVE
BUN SERPL-MCNC: 22 MG/DL (ref 8–23)
BUN/CREAT SERPL: 18 (ref 7–25)
CALCIUM SPEC-SCNC: 9.8 MG/DL (ref 8.6–10.5)
CHLORIDE SERPL-SCNC: 97 MMOL/L (ref 98–107)
CLARITY UR: CLEAR
CO2 SERPL-SCNC: 26.9 MMOL/L (ref 22–29)
COLOR UR: YELLOW
CREAT SERPL-MCNC: 1.22 MG/DL (ref 0.57–1)
D-LACTATE SERPL-SCNC: 1.6 MMOL/L (ref 0.5–2)
DEPRECATED RDW RBC AUTO: 44.5 FL (ref 37–54)
EGFRCR SERPLBLD CKD-EPI 2021: 45.8 ML/MIN/1.73
EOSINOPHIL # BLD AUTO: 0.08 10*3/MM3 (ref 0–0.4)
EOSINOPHIL NFR BLD AUTO: 0.4 % (ref 0.3–6.2)
ERYTHROCYTE [DISTWIDTH] IN BLOOD BY AUTOMATED COUNT: 13.1 % (ref 12.3–15.4)
GLOBULIN UR ELPH-MCNC: 3 GM/DL
GLUCOSE SERPL-MCNC: 133 MG/DL (ref 65–99)
GLUCOSE UR STRIP-MCNC: NEGATIVE MG/DL
HCT VFR BLD AUTO: 39.6 % (ref 34–46.6)
HGB BLD-MCNC: 13.8 G/DL (ref 12–15.9)
HGB UR QL STRIP.AUTO: NEGATIVE
HYALINE CASTS UR QL AUTO: ABNORMAL /LPF
IMM GRANULOCYTES # BLD AUTO: 0.12 10*3/MM3 (ref 0–0.05)
IMM GRANULOCYTES NFR BLD AUTO: 0.5 % (ref 0–0.5)
INR PPP: 0.92 (ref 0.9–1.1)
KETONES UR QL STRIP: ABNORMAL
LEUKOCYTE ESTERASE UR QL STRIP.AUTO: ABNORMAL
LYMPHOCYTES # BLD AUTO: 0.34 10*3/MM3 (ref 0.7–3.1)
LYMPHOCYTES NFR BLD AUTO: 1.5 % (ref 19.6–45.3)
MAGNESIUM SERPL-MCNC: 2 MG/DL (ref 1.6–2.4)
MCH RBC QN AUTO: 32.1 PG (ref 26.6–33)
MCHC RBC AUTO-ENTMCNC: 34.8 G/DL (ref 31.5–35.7)
MCV RBC AUTO: 92.1 FL (ref 79–97)
MONOCYTES # BLD AUTO: 1.17 10*3/MM3 (ref 0.1–0.9)
MONOCYTES NFR BLD AUTO: 5.3 % (ref 5–12)
NEUTROPHILS NFR BLD AUTO: 20.21 10*3/MM3 (ref 1.7–7)
NEUTROPHILS NFR BLD AUTO: 92 % (ref 42.7–76)
NITRITE UR QL STRIP: NEGATIVE
NRBC BLD AUTO-RTO: 0 /100 WBC (ref 0–0.2)
NT-PROBNP SERPL-MCNC: 296 PG/ML (ref 0–1800)
PH UR STRIP.AUTO: 7 [PH] (ref 5–8)
PLATELET # BLD AUTO: 234 10*3/MM3 (ref 140–450)
PMV BLD AUTO: 10.9 FL (ref 6–12)
POTASSIUM SERPL-SCNC: 3.1 MMOL/L (ref 3.5–5.2)
PROT SERPL-MCNC: 7.4 G/DL (ref 6–8.5)
PROT UR QL STRIP: ABNORMAL
PROTHROMBIN TIME: 12.5 SECONDS (ref 11.7–14.2)
RBC # BLD AUTO: 4.3 10*6/MM3 (ref 3.77–5.28)
RBC # UR STRIP: ABNORMAL /HPF
REF LAB TEST METHOD: ABNORMAL
SODIUM SERPL-SCNC: 136 MMOL/L (ref 136–145)
SP GR UR STRIP: 1.02 (ref 1–1.03)
SQUAMOUS #/AREA URNS HPF: ABNORMAL /HPF
TROPONIN T SERPL HS-MCNC: 33 NG/L
UROBILINOGEN UR QL STRIP: ABNORMAL
WBC # UR STRIP: ABNORMAL /HPF
WBC NRBC COR # BLD: 21.98 10*3/MM3 (ref 3.4–10.8)

## 2023-05-18 PROCEDURE — G0378 HOSPITAL OBSERVATION PER HR: HCPCS

## 2023-05-18 PROCEDURE — 70450 CT HEAD/BRAIN W/O DYE: CPT

## 2023-05-18 PROCEDURE — 96365 THER/PROPH/DIAG IV INF INIT: CPT

## 2023-05-18 PROCEDURE — 80053 COMPREHEN METABOLIC PANEL: CPT | Performed by: PHYSICIAN ASSISTANT

## 2023-05-18 PROCEDURE — 84484 ASSAY OF TROPONIN QUANT: CPT | Performed by: PHYSICIAN ASSISTANT

## 2023-05-18 PROCEDURE — 81001 URINALYSIS AUTO W/SCOPE: CPT | Performed by: PHYSICIAN ASSISTANT

## 2023-05-18 PROCEDURE — 36415 COLL VENOUS BLD VENIPUNCTURE: CPT

## 2023-05-18 PROCEDURE — 87040 BLOOD CULTURE FOR BACTERIA: CPT | Performed by: PHYSICIAN ASSISTANT

## 2023-05-18 PROCEDURE — 25010000002 CEFTRIAXONE PER 250 MG: Performed by: PHYSICIAN ASSISTANT

## 2023-05-18 PROCEDURE — 85025 COMPLETE CBC W/AUTO DIFF WBC: CPT | Performed by: PHYSICIAN ASSISTANT

## 2023-05-18 PROCEDURE — 99285 EMERGENCY DEPT VISIT HI MDM: CPT

## 2023-05-18 PROCEDURE — 83880 ASSAY OF NATRIURETIC PEPTIDE: CPT | Performed by: PHYSICIAN ASSISTANT

## 2023-05-18 PROCEDURE — 93005 ELECTROCARDIOGRAM TRACING: CPT | Performed by: EMERGENCY MEDICINE

## 2023-05-18 PROCEDURE — 83735 ASSAY OF MAGNESIUM: CPT | Performed by: PHYSICIAN ASSISTANT

## 2023-05-18 PROCEDURE — 85730 THROMBOPLASTIN TIME PARTIAL: CPT | Performed by: PHYSICIAN ASSISTANT

## 2023-05-18 PROCEDURE — 71045 X-RAY EXAM CHEST 1 VIEW: CPT

## 2023-05-18 PROCEDURE — 85610 PROTHROMBIN TIME: CPT | Performed by: PHYSICIAN ASSISTANT

## 2023-05-18 PROCEDURE — 83605 ASSAY OF LACTIC ACID: CPT | Performed by: PHYSICIAN ASSISTANT

## 2023-05-18 RX ORDER — NITROGLYCERIN 0.4 MG/1
0.4 TABLET SUBLINGUAL
Status: DISCONTINUED | OUTPATIENT
Start: 2023-05-18 | End: 2023-05-20 | Stop reason: HOSPADM

## 2023-05-18 RX ORDER — SODIUM CHLORIDE 0.9 % (FLUSH) 0.9 %
10 SYRINGE (ML) INJECTION AS NEEDED
Status: DISCONTINUED | OUTPATIENT
Start: 2023-05-18 | End: 2023-05-20 | Stop reason: HOSPADM

## 2023-05-18 RX ORDER — ASPIRIN 325 MG
325 TABLET ORAL ONCE
Status: COMPLETED | OUTPATIENT
Start: 2023-05-18 | End: 2023-05-19

## 2023-05-18 RX ORDER — SODIUM CHLORIDE 9 MG/ML
40 INJECTION, SOLUTION INTRAVENOUS AS NEEDED
Status: DISCONTINUED | OUTPATIENT
Start: 2023-05-18 | End: 2023-05-20 | Stop reason: HOSPADM

## 2023-05-18 RX ORDER — SODIUM CHLORIDE 0.9 % (FLUSH) 0.9 %
10 SYRINGE (ML) INJECTION EVERY 12 HOURS SCHEDULED
Status: DISCONTINUED | OUTPATIENT
Start: 2023-05-18 | End: 2023-05-20 | Stop reason: HOSPADM

## 2023-05-18 RX ORDER — ASPIRIN 300 MG/1
300 SUPPOSITORY RECTAL ONCE
Status: COMPLETED | OUTPATIENT
Start: 2023-05-18 | End: 2023-05-19

## 2023-05-18 RX ORDER — POTASSIUM CHLORIDE 750 MG/1
40 TABLET, FILM COATED, EXTENDED RELEASE ORAL ONCE
Status: COMPLETED | OUTPATIENT
Start: 2023-05-18 | End: 2023-05-19

## 2023-05-18 RX ADMIN — CEFTRIAXONE SODIUM 1 G: 1 INJECTION, POWDER, FOR SOLUTION INTRAMUSCULAR; INTRAVENOUS at 22:21

## 2023-05-18 RX ADMIN — SODIUM CHLORIDE 1000 ML: 9 INJECTION, SOLUTION INTRAVENOUS at 22:21

## 2023-05-18 NOTE — ED NOTES
Pt presents to ED via EMS from middle school soccer game. Pt had syncopal episode and fell to the ground. Pt did hit head, did have LOC, and unsure of blood thinner use. Pt family reports recent UTI treatment. Upon EMS arrival when they attempted to get her on the stretcher pt had another syncopal episode. IV access established and pt give fluids in route. Pt is A&OX4 and in a mask at this time.     Martina John RN

## 2023-05-18 NOTE — ED PROVIDER NOTES
EMERGENCY DEPARTMENT ENCOUNTER    Room Number:  08/08  Date seen:  5/18/2023  PCP: Bk Mendez MD  Discussed/ obtained information from independent historians: Family at bedside      HPI:  Chief Complaint: Multiple syncopal episodes    Context: Marcia Ulloa is a 78 y.o. female who presents to the ED c/o multiple syncopal episodes.  Patient is currently being treated for UTI, was placed on antibiotic yesterday at urologist office, however she cannot recall what the antibiotic is.  She has been having urinary frequency but denies fever or dysuria.  She reports it made her feel very sleepy and she slept through the majority of the day today.  She got up and went to her grandchild's middle school soccer game.  She was walking across the field when family saw her slowly go down to the ground.  Patient reports she did fully pass out.  EMS was called and when they placed patient on the stretcher she had a second syncopal episode.  Patient denies recent vomiting, diarrhea.  No history of cardiac or pulmonary disease.  Patient denies associated chest pain, dyspnea, or unilateral numbness/weakness.  No other systemic complaints at this time.      External (non-ED) record review:   · Reviewed note from office visit with nephrology on 11/18/2022 where patient was seen for CKD 3.  Reviewed assessment and plan.  Patient kidney function stable, continue current meds and follow-up for next scheduled visit.  · Reviewed most recent laboratory studies.  Most recent CBC with hemoglobin 12.9.  Most recent CMP with creatinine 1.24.      PAST MEDICAL HISTORY  Active Ambulatory Problems     Diagnosis Date Noted   • Essential hypertension 11/22/2017   • Other hyperlipidemia 11/22/2017   • Primary osteoarthritis of knee 11/22/2017   • Elevated serum creatinine 11/30/2017   • Acquired absence of breast and absent nipple 04/24/2013   • Asymptomatic postmenopausal status 04/24/2013   • Breast cancer, right breast 06/18/2014   •  Osteoarthritis of hip 06/10/2019   • Personal history of breast cancer 04/24/2013   • Uterovaginal prolapse, incomplete 01/07/2020   • Cystocele, midline 01/07/2020   • Cystocele, lateral 01/07/2020   • Vaginal enterocele, congenital or acquired 01/07/2020   • Loss of perineal body, female 01/07/2020   • Female stress incontinence 01/07/2020   • Incomplete defecation 01/07/2020   • Incompetence or weakening of pubocervical tissue 01/07/2020   • Incompetence or weakening of rectovaginal tissue 01/07/2020   • Personal history of endometriosis 01/07/2020     Resolved Ambulatory Problems     Diagnosis Date Noted   • No Resolved Ambulatory Problems     Past Medical History:   Diagnosis Date   • Breast cancer 2010   • Drug therapy    • Eczema    • History of shingles    • Hyperlipidemia    • Hypertension    • Knee pain    • Prolapse of female bladder, acquired    • Seasonal allergies    • Slow to wake up after anesthesia    • SOB (shortness of breath) on exertion    • Urinary incontinence          PAST SURGICAL HISTORY  Past Surgical History:   Procedure Laterality Date   • ANTERIOR AND POSTERIOR VAGINAL REPAIR N/A 01/07/2020    Procedure: LAPAROSCOPIC/ABDOMINAL ENTEROCELE REPAIR;;  Surgeon: Senia Frederick MD;  Location: [facility];  Service: Gynecology   • BREAST BIOPSY Right 2010    malignant in 3 quadrents.   • BREAST SURGERY  08/01/2010    mastectomy   • KNEE SURGERY     • MASTECTOMY Right 2010   • PUBOVAGINAL SLING N/A 01/07/2020    Procedure: PUBOVAGINAL SLING; POSTERIOR COLPORRHAPHY; ANTERIOR COLPORRHAPHY; ENTRAPERITONEAL COLPOPEXY SACROSPINUS LIGAMENT FIXATION; INSERTION OF VAGINAL GRAFT; CYSTOURETHROSCOPY;  Surgeon: Senia Frederick MD;  Location: [facility];  Service: Gynecology   • REDUCTION MAMMAPLASTY Left    • TOTAL LAPAROSCOPIC HYSTERECTOMY, SACROCOLPOPEXY N/A 01/07/2020    Procedure: LAPAROSCOPIC UTEROSCRAL LIGAMENT SUSPENSION/SACRAL COLPOPEXY; LAPAROSCOPIC PARAVAGINAL REPAIR; TOTAL LAPAROSCPIC  HYSTERECTOMY WITH BILATERAL SALPINGECTOMY;;  Surgeon: Senia Frederick MD;  Location: [facility] OR;  Service: Gynecology   • TUBAL ABDOMINAL LIGATION           FAMILY HISTORY  Family History   Problem Relation Age of Onset   • COPD Mother    • Heart failure Mother    • Hypertension Mother    • Heart disease Mother    • Depression Mother    • Vision loss Mother    • Heart failure Father    • Hypertension Father    • Heart disease Father    • Vision loss Maternal Grandfather    • Malig Hyperthermia Neg Hx    • Breast cancer Neg Hx    • Ovarian cancer Neg Hx          SOCIAL HISTORY  Social History     Socioeconomic History   • Marital status:    Tobacco Use   • Smoking status: Never   • Smokeless tobacco: Never   Substance and Sexual Activity   • Alcohol use: Yes     Comment: 1/day   • Drug use: No   • Sexual activity: Never         ALLERGIES  Penicillins        REVIEW OF SYSTEMS  Review of Systems   Constitutional: Negative for chills and fever.   HENT: Negative for ear pain and sore throat.    Respiratory: Negative for cough and shortness of breath.    Cardiovascular: Negative for chest pain and palpitations.   Gastrointestinal: Negative for abdominal pain and vomiting.   Genitourinary: Positive for frequency. Negative for dysuria and hematuria.   Musculoskeletal: Negative for arthralgias and joint swelling.   Skin: Negative for pallor and rash.   Neurological: Positive for syncope and light-headedness. Negative for numbness and headaches.   Psychiatric/Behavioral: Negative for confusion and hallucinations.            PHYSICAL EXAM  ED Triage Vitals [05/18/23 1904]   Temp Heart Rate Resp BP SpO2   99.2 °F (37.3 °C) 98 18 140/75 96 %      Temp src Heart Rate Source Patient Position BP Location FiO2 (%)   Tympanic Monitor Sitting Right arm --       Physical Exam  Constitutional:       Appearance: She is well-developed.   HENT:      Head: Normocephalic and atraumatic.   Eyes:      Extraocular Movements:  Extraocular movements intact.      Conjunctiva/sclera: Conjunctivae normal.      Pupils: Pupils are equal, round, and reactive to light.   Cardiovascular:      Rate and Rhythm: Normal rate and regular rhythm.      Heart sounds: Normal heart sounds.      Comments: Distal pulses intact  Pulmonary:      Effort: Pulmonary effort is normal.      Breath sounds: Normal breath sounds.   Abdominal:      General: There is no distension.   Skin:     General: Skin is warm.   Neurological:      General: No focal deficit present.      Mental Status: She is alert and oriented to person, place, and time.      Comments: Sensation intact to light touch all 4 extremities.  Motor strength 5/5 all 4 extremities.   Psychiatric:         Mood and Affect: Mood normal.         Vital signs and nursing notes reviewed.          LAB RESULTS  Recent Results (from the past 24 hour(s))   ECG 12 Lead Syncope    Collection Time: 05/18/23  7:34 PM   Result Value Ref Range    QT Interval 462 ms   Comprehensive Metabolic Panel    Collection Time: 05/18/23  8:12 PM    Specimen: Blood   Result Value Ref Range    Glucose 133 (H) 65 - 99 mg/dL    BUN 22 8 - 23 mg/dL    Creatinine 1.22 (H) 0.57 - 1.00 mg/dL    Sodium 136 136 - 145 mmol/L    Potassium 3.1 (L) 3.5 - 5.2 mmol/L    Chloride 97 (L) 98 - 107 mmol/L    CO2 26.9 22.0 - 29.0 mmol/L    Calcium 9.8 8.6 - 10.5 mg/dL    Total Protein 7.4 6.0 - 8.5 g/dL    Albumin 4.4 3.5 - 5.2 g/dL    ALT (SGPT) 22 1 - 33 U/L    AST (SGOT) 25 1 - 32 U/L    Alkaline Phosphatase 86 39 - 117 U/L    Total Bilirubin 0.8 0.0 - 1.2 mg/dL    Globulin 3.0 gm/dL    A/G Ratio 1.5 g/dL    BUN/Creatinine Ratio 18.0 7.0 - 25.0    Anion Gap 12.1 5.0 - 15.0 mmol/L    eGFR 45.8 (L) >60.0 mL/min/1.73   Protime-INR    Collection Time: 05/18/23  8:12 PM    Specimen: Blood   Result Value Ref Range    Protime 12.5 11.7 - 14.2 Seconds    INR 0.92 0.90 - 1.10   aPTT    Collection Time: 05/18/23  8:12 PM    Specimen: Blood   Result Value Ref  Range    PTT 22.8 22.7 - 35.4 seconds   Single High Sensitivity Troponin T    Collection Time: 05/18/23  8:12 PM    Specimen: Blood   Result Value Ref Range    HS Troponin T 33 (H) <10 ng/L   BNP    Collection Time: 05/18/23  8:12 PM    Specimen: Blood   Result Value Ref Range    proBNP 296.0 0.0 - 1,800.0 pg/mL   Magnesium    Collection Time: 05/18/23  8:12 PM    Specimen: Blood   Result Value Ref Range    Magnesium 2.0 1.6 - 2.4 mg/dL   Lactic Acid, Plasma    Collection Time: 05/18/23  8:12 PM    Specimen: Blood   Result Value Ref Range    Lactate 1.6 0.5 - 2.0 mmol/L   CBC Auto Differential    Collection Time: 05/18/23  8:12 PM    Specimen: Blood   Result Value Ref Range    WBC 21.98 (H) 3.40 - 10.80 10*3/mm3    RBC 4.30 3.77 - 5.28 10*6/mm3    Hemoglobin 13.8 12.0 - 15.9 g/dL    Hematocrit 39.6 34.0 - 46.6 %    MCV 92.1 79.0 - 97.0 fL    MCH 32.1 26.6 - 33.0 pg    MCHC 34.8 31.5 - 35.7 g/dL    RDW 13.1 12.3 - 15.4 %    RDW-SD 44.5 37.0 - 54.0 fl    MPV 10.9 6.0 - 12.0 fL    Platelets 234 140 - 450 10*3/mm3    Neutrophil % 92.0 (H) 42.7 - 76.0 %    Lymphocyte % 1.5 (L) 19.6 - 45.3 %    Monocyte % 5.3 5.0 - 12.0 %    Eosinophil % 0.4 0.3 - 6.2 %    Basophil % 0.3 0.0 - 1.5 %    Immature Grans % 0.5 0.0 - 0.5 %    Neutrophils, Absolute 20.21 (H) 1.70 - 7.00 10*3/mm3    Lymphocytes, Absolute 0.34 (L) 0.70 - 3.10 10*3/mm3    Monocytes, Absolute 1.17 (H) 0.10 - 0.90 10*3/mm3    Eosinophils, Absolute 0.08 0.00 - 0.40 10*3/mm3    Basophils, Absolute 0.06 0.00 - 0.20 10*3/mm3    Immature Grans, Absolute 0.12 (H) 0.00 - 0.05 10*3/mm3    nRBC 0.0 0.0 - 0.2 /100 WBC   Urinalysis With Culture If Indicated - Urine, Clean Catch    Collection Time: 05/18/23  8:32 PM    Specimen: Urine, Clean Catch   Result Value Ref Range    Color, UA Yellow Yellow, Straw    Appearance, UA Clear Clear    pH, UA 7.0 5.0 - 8.0    Specific Gravity, UA 1.017 1.005 - 1.030    Glucose, UA Negative Negative    Ketones, UA Trace (A) Negative     Bilirubin, UA Negative Negative    Blood, UA Negative Negative    Protein,  mg/dL (2+) (A) Negative    Leuk Esterase, UA Moderate (2+) (A) Negative    Nitrite, UA Negative Negative    Urobilinogen, UA 0.2 E.U./dL 0.2 - 1.0 E.U./dL   Urinalysis, Microscopic Only - Urine, Clean Catch    Collection Time: 05/18/23  8:32 PM    Specimen: Urine, Clean Catch   Result Value Ref Range    RBC, UA 0-2 None Seen, 0-2 /HPF    WBC, UA 6-12 (A) None Seen, 0-2 /HPF    Bacteria, UA None Seen None Seen /HPF    Squamous Epithelial Cells, UA 0-2 None Seen, 0-2 /HPF    Hyaline Casts, UA None Seen None Seen /LPF    Methodology Manual Light Microscopy        Ordered the above labs and reviewed the results.        RADIOLOGY  CT Head Without Contrast    Result Date: 5/18/2023  CT HEAD WO CONTRAST-  INDICATIONS: Syncope  TECHNIQUE: Radiation dose reduction techniques were utilized, including automated exposure control and exposure modulation based on body size. Noncontrast head CT  COMPARISON: None available  FINDINGS:    No acute intracranial hemorrhage, midline shift or mass effect. No acute territorial infarct is identified.  Ventricles, cisterns, cerebral sulci are unremarkable for patient age.  Right frontal sinus is hypoplastic. The visualized paranasal sinuses, orbits, mastoid air cells are otherwise unremarkable.        No acute intracranial hemorrhage or hydrocephalus. If there is further clinical concern, MRI could be considered for further evaluation.  This report was finalized on 5/18/2023 8:49 PM by Dr. Margarito Tsai M.D.      XR Chest 1 View    Result Date: 5/18/2023  EXAMINATION: SINGLE VIEW CHEST RADIOGRAPH  HISTORY: 78-year-old female with a history of syncope.  FINDINGS: An upright AP portable chest radiograph was obtained. Comparison is made to a chest radiograph dated 01/03/2020. The lungs are normal in volume and are clear. The cardiomediastinal silhouette, pulmonary vasculature and osseous structures appear  normal.      Negative chest radiograph.  This report was finalized on 5/18/2023 9:26 PM by Dr. Gm Navarrete M.D.        Ordered the above noted radiological studies. Reviewed by me in PACS.              MEDICATIONS GIVEN IN ER  Medications   sodium chloride 0.9 % bolus 1,000 mL (1,000 mL Intravenous New Bag 5/18/23 2221)   cefTRIAXone (ROCEPHIN) 1 g in sodium chloride 0.9 % 100 mL IVPB-VTB (1 g Intravenous New Bag 5/18/23 2221)                   MEDICAL DECISION MAKING, PROGRESS, and CONSULTS    All labs have been independently reviewed by me.  All radiology studies have been reviewed by me and I have also reviewed the radiology report.   EKG's independently viewed and interpreted by me.  Discussion below represents my analysis of pertinent findings related to patient's condition, differential diagnosis, treatment plan and final disposition.      Additional sources:    - Chronic or social conditions impacting care: CKD        Orders placed during this visit:  Orders Placed This Encounter   Procedures   • Blood Culture - Blood,   • Blood Culture - Blood,   • Urine Culture - Urine,   • XR Chest 1 View   • CT Head Without Contrast   • Comprehensive Metabolic Panel   • Protime-INR   • aPTT   • Urinalysis With Culture If Indicated - Urine, Clean Catch   • Single High Sensitivity Troponin T   • BNP   • Magnesium   • Lactic Acid, Plasma   • CBC Auto Differential   • Urinalysis, Microscopic Only - Urine, Clean Catch   • Orthostatic Vitals   • LHA (on-call MD unless specified) Details   • ECG 12 Lead Syncope   • Initiate Observation Status   • CBC & Differential         Additional orders considered but not ordered:  MRI of the brain    Differential diagnosis:  Vasovagal syncope, orthostatic syncope, medication adverse effect, electrolyte disturbance, acute UTI, cardiac arrhythmia      Independent interpretation of labs, radiology studies, and discussions with consultants:  ED Course as of 05/18/23 2316   Thu May 18, 2023    2019 Chest x-ray independently interpreted by me as no pneumothorax [MP]   2031 WBC(!): 21.98 [MP]   2031 Patient meeting sepsis criteria with WBC 21 and heart rate 98 [MP]   2031 Neutrophils Absolute(!): 20.21 [MP]   2046 CT scan of the head independently interpreted by me as no intracranial hemorrhage [MP]   2058 BUN: 22 [MP]   2058 Creatinine(!): 1.22  Most recent 1.22 [MP]   2058 HS Troponin T(!): 33 [MP]   2143 Leukocytes, UA(!): Moderate (2+) [MP]   2309 Still awaiting hospitalist consult [MP]   2314 Spoke with Graciela with A.  Reviewed patient presentation and ED findings.  She agrees to admit to the service of Dr. Canales to a telemetry bed. [MP]      ED Course User Index  [MP] Gayle Lucas PA-C             Patient was wearing a face mask when I entered the room and they continued to wear a mask throughout their stay in the ED.  I wore PPE, including  gloves, face mask with shield or face mask with goggles whenever I was in the room with patient.     DIAGNOSIS  Final diagnoses:   Recurrent syncope   Acute UTI   Sepsis without acute organ dysfunction, due to unspecified organism   Elevated troponin         Latest Documented Vital Signs:  As of 23:16 EDT  BP- 112/65 HR- 85 Temp- 99.2 °F (37.3 °C) (Tympanic) O2 sat- 93%              --    Please note that portions of this were completed with a voice recognition program.       Note Disclaimer: At [facility], we believe that sharing information builds trust and better relationships. You are receiving this note because you are receiving care at [facility] or recently visited. It is possible you will see health information before a provider has talked with you about it. This kind of information can be easy to misunderstand. To help you fully understand what it means for your health, we urge you to discuss this note with your provider.           Gayle Lucas PA-C  05/18/23 2805

## 2023-05-19 ENCOUNTER — APPOINTMENT (OUTPATIENT)
Dept: CARDIOLOGY | Facility: HOSPITAL | Age: 78
End: 2023-05-19
Payer: MEDICARE

## 2023-05-19 LAB
AORTIC DIMENSIONLESS INDEX: 0.8 (DI)
BH CV ECHO MEAS - ACS: 2.13 CM
BH CV ECHO MEAS - AI P1/2T: 387.7 MSEC
BH CV ECHO MEAS - AO MAX PG: 7.3 MMHG
BH CV ECHO MEAS - AO MEAN PG: 3.9 MMHG
BH CV ECHO MEAS - AO V2 MAX: 134.7 CM/SEC
BH CV ECHO MEAS - AO V2 VTI: 29.4 CM
BH CV ECHO MEAS - AVA(I,D): 2.37 CM2
BH CV ECHO MEAS - EDV(CUBED): 89.8 ML
BH CV ECHO MEAS - EDV(MOD-SP2): 59 ML
BH CV ECHO MEAS - EDV(MOD-SP4): 49 ML
BH CV ECHO MEAS - EF(MOD-BP): 61.6 %
BH CV ECHO MEAS - EF(MOD-SP2): 62.7 %
BH CV ECHO MEAS - EF(MOD-SP4): 59.2 %
BH CV ECHO MEAS - ESV(CUBED): 26.6 ML
BH CV ECHO MEAS - ESV(MOD-SP2): 22 ML
BH CV ECHO MEAS - ESV(MOD-SP4): 20 ML
BH CV ECHO MEAS - FS: 33.3 %
BH CV ECHO MEAS - IVS/LVPW: 1 CM
BH CV ECHO MEAS - IVSD: 1.03 CM
BH CV ECHO MEAS - LAT PEAK E' VEL: 9.9 CM/SEC
BH CV ECHO MEAS - LV DIASTOLIC VOL/BSA (35-75): 26.8 CM2
BH CV ECHO MEAS - LV MASS(C)D: 158.2 GRAMS
BH CV ECHO MEAS - LV MAX PG: 3.5 MMHG
BH CV ECHO MEAS - LV MEAN PG: 2.18 MMHG
BH CV ECHO MEAS - LV SYSTOLIC VOL/BSA (12-30): 10.9 CM2
BH CV ECHO MEAS - LV V1 MAX: 93.6 CM/SEC
BH CV ECHO MEAS - LV V1 VTI: 22.2 CM
BH CV ECHO MEAS - LVIDD: 4.5 CM
BH CV ECHO MEAS - LVIDS: 3 CM
BH CV ECHO MEAS - LVOT AREA: 3.1 CM2
BH CV ECHO MEAS - LVOT DIAM: 2 CM
BH CV ECHO MEAS - LVPWD: 1.03 CM
BH CV ECHO MEAS - MED PEAK E' VEL: 8.8 CM/SEC
BH CV ECHO MEAS - MV A MAX VEL: 85.9 CM/SEC
BH CV ECHO MEAS - MV DEC SLOPE: 391.6 CM/SEC2
BH CV ECHO MEAS - MV DEC TIME: 0.15 MSEC
BH CV ECHO MEAS - MV E MAX VEL: 70.3 CM/SEC
BH CV ECHO MEAS - MV E/A: 0.82
BH CV ECHO MEAS - MV MAX PG: 2.8 MMHG
BH CV ECHO MEAS - MV MEAN PG: 1.36 MMHG
BH CV ECHO MEAS - MV P1/2T: 60.6 MSEC
BH CV ECHO MEAS - MV V2 VTI: 20.7 CM
BH CV ECHO MEAS - MVA(P1/2T): 3.6 CM2
BH CV ECHO MEAS - MVA(VTI): 3.4 CM2
BH CV ECHO MEAS - PA ACC TIME: 0.14 SEC
BH CV ECHO MEAS - PA PR(ACCEL): 14.8 MMHG
BH CV ECHO MEAS - PA V2 MAX: 67.9 CM/SEC
BH CV ECHO MEAS - QP/QS: 0.85
BH CV ECHO MEAS - RV MAX PG: 1.06 MMHG
BH CV ECHO MEAS - RV V1 MAX: 51.4 CM/SEC
BH CV ECHO MEAS - RV V1 VTI: 12.5 CM
BH CV ECHO MEAS - RVOT DIAM: 2.45 CM
BH CV ECHO MEAS - SI(MOD-SP2): 20.2 ML/M2
BH CV ECHO MEAS - SI(MOD-SP4): 15.8 ML/M2
BH CV ECHO MEAS - SV(LVOT): 69.5 ML
BH CV ECHO MEAS - SV(MOD-SP2): 37 ML
BH CV ECHO MEAS - SV(MOD-SP4): 29 ML
BH CV ECHO MEAS - SV(RVOT): 58.9 ML
BH CV ECHO MEASUREMENTS AVERAGE E/E' RATIO: 7.52
BH CV XLRA - RV BASE: 3.4 CM
BH CV XLRA - RV LENGTH: 7.4 CM
BH CV XLRA - RV MID: 2.6 CM
BH CV XLRA - TDI S': 10.6 CM/SEC
GEN 5 2HR TROPONIN T REFLEX: 33 NG/L
MAXIMAL PREDICTED HEART RATE: 143 BPM
QT INTERVAL: 462 MS
SINUS: 3.5 CM
STRESS TARGET HR: 122 BPM
TROPONIN T DELTA: 1 NG/L
TROPONIN T SERPL HS-MCNC: 32 NG/L

## 2023-05-19 PROCEDURE — G0378 HOSPITAL OBSERVATION PER HR: HCPCS

## 2023-05-19 PROCEDURE — 25010000002 CEFTRIAXONE PER 250 MG: Performed by: NURSE PRACTITIONER

## 2023-05-19 PROCEDURE — 93880 EXTRACRANIAL BILAT STUDY: CPT

## 2023-05-19 PROCEDURE — 25510000001 PERFLUTREN (DEFINITY) 8.476 MG IN SODIUM CHLORIDE (PF) 0.9 % 10 ML INJECTION: Performed by: STUDENT IN AN ORGANIZED HEALTH CARE EDUCATION/TRAINING PROGRAM

## 2023-05-19 PROCEDURE — 84484 ASSAY OF TROPONIN QUANT: CPT | Performed by: NURSE PRACTITIONER

## 2023-05-19 PROCEDURE — 93306 TTE W/DOPPLER COMPLETE: CPT

## 2023-05-19 RX ORDER — ROSUVASTATIN CALCIUM 5 MG/1
5 TABLET, COATED ORAL DAILY
Status: DISCONTINUED | OUTPATIENT
Start: 2023-05-19 | End: 2023-05-20 | Stop reason: HOSPADM

## 2023-05-19 RX ORDER — ALBUTEROL SULFATE 2.5 MG/3ML
2.5 SOLUTION RESPIRATORY (INHALATION) EVERY 6 HOURS PRN
Status: DISCONTINUED | OUTPATIENT
Start: 2023-05-19 | End: 2023-05-20 | Stop reason: HOSPADM

## 2023-05-19 RX ORDER — UREA 10 %
3 LOTION (ML) TOPICAL NIGHTLY PRN
Status: DISCONTINUED | OUTPATIENT
Start: 2023-05-19 | End: 2023-05-20 | Stop reason: HOSPADM

## 2023-05-19 RX ORDER — CHOLECALCIFEROL (VITAMIN D3) 125 MCG
5 CAPSULE ORAL NIGHTLY
Status: DISCONTINUED | OUTPATIENT
Start: 2023-05-19 | End: 2023-05-20 | Stop reason: HOSPADM

## 2023-05-19 RX ADMIN — ROSUVASTATIN CALCIUM 5 MG: 5 TABLET, FILM COATED ORAL at 12:23

## 2023-05-19 RX ADMIN — Medication 5 MG: at 20:10

## 2023-05-19 RX ADMIN — Medication 10 ML: at 12:23

## 2023-05-19 RX ADMIN — Medication 10 ML: at 01:55

## 2023-05-19 RX ADMIN — ASPIRIN 81 MG: 81 TABLET, COATED ORAL at 12:23

## 2023-05-19 RX ADMIN — POTASSIUM CHLORIDE 40 MEQ: 750 TABLET, EXTENDED RELEASE ORAL at 01:49

## 2023-05-19 RX ADMIN — CEFTRIAXONE SODIUM 1 G: 1 INJECTION, POWDER, FOR SOLUTION INTRAMUSCULAR; INTRAVENOUS at 21:48

## 2023-05-19 RX ADMIN — PERFLUTREN 2 ML: 6.52 INJECTION, SUSPENSION INTRAVENOUS at 16:35

## 2023-05-19 RX ADMIN — ASPIRIN 325 MG: 325 TABLET ORAL at 03:02

## 2023-05-19 RX ADMIN — Medication 10 ML: at 20:10

## 2023-05-19 NOTE — H&P
Patient Name:  Marcia Ulloa  YOB: 1945  MRN:  [id number]  Admit Date:  5/18/2023  Patient Care Team:  Bk Mendez MD as PCP - General (Family Medicine)  Senia Frederick MD as Consulting Physician (Obstetrics and Gynecology)  Thaddeus Stafford MD as Consulting Physician (Orthopedic Surgery)  Elayne Paulino MD (Inactive) as Consulting Physician (Gynecology)  Hunter Benito DC (Chiropractic Medicine)  Shahrzad Maza MD as Consulting Physician (Ophthalmology)  Kostas Loomis MD (Dermatology)      Subjective   History Present Illness     Chief Complaint   Patient presents with   • Syncope     History of Present Illness   Ms. Ulloa is a 78 y.o. female  with a history of breast cancer, HTN, GOOD, CKD, and recent  UTI  that presents to [facility] via EMS after she had a witnessed syncopal event with loss of consciousness for few seconds while walking across a ball field. She has full recall of the events and believes she was just over heated after sitting in the sun during her grandchild's ball game with a sweatshirt in place. She is reported to have had a second episode while on the stretcher with EMS again with loss of consciousness for few seconds.  She denies any previous similar episodes, palpitations, lightheadedness, dizziness, vision changes, or changes to medication other than recent Macrobid for a UTI given to her by her urologist.  She states that she has been eating well, has been drinking plenty of fluids.  Currently she is alert and oriented, up walking around in ER room, with stable gait, and in no acute distress, she denies chest pain, palpitations, shortness of breath, abdominal pain, nausea, vomiting, diarrhea, constipation, or  complaints.  She states that she really feels like she could go home but agrees to stay,to be be evaluated.    Initial evaluation in the emergency department blood pressure 112/65, heart rate 85, respiratory rate 18,  oxygen saturation 94% on room air, she is afebrile with a Tmax of 98.8  Elevated troponin 33, glucose 133, potassium 3.1, creatinine 1.22 is close to her baseline,  This shows trace ketones, moderate leukocytes, 2+ protein, 6-12 WBCs.  Blood culture, urine cultures pending  Chest x-ray is unremarkable  CT scan of the head shows no acute intracranial processes.  EKG shows sinus tachycardia heart rate 101    She will be admitted to the hospitalist group for observation and treatment of recurrent syncopal episodes and other acute and or chronic conditions.    ,Review of Systems   Constitutional: Negative.    HENT: Negative.    Eyes: Negative.    Respiratory: Negative.    Cardiovascular: Negative.    Gastrointestinal: Negative.    Musculoskeletal: Negative.    Skin: Negative.    Allergic/Immunologic: Negative.    Hematological: Negative.    Psychiatric/Behavioral: Negative.    All other systems reviewed and are negative.     Personal History     Past Medical History:   Diagnosis Date   • Breast cancer 2010    Rt   • Drug therapy     femara 5-7 years.   • Eczema    • History of shingles    • Hyperlipidemia    • Hypertension    • Knee pain    • Prolapse of female bladder, acquired    • Seasonal allergies    • Slow to wake up after anesthesia    • SOB (shortness of breath) on exertion    • Urinary incontinence      Past Surgical History:   Procedure Laterality Date   • ANTERIOR AND POSTERIOR VAGINAL REPAIR N/A 01/07/2020    Procedure: LAPAROSCOPIC/ABDOMINAL ENTEROCELE REPAIR;;  Surgeon: Senia Frederick MD;  Location: [facility];  Service: Gynecology   • BREAST BIOPSY Right 2010    malignant in 3 quadrents.   • BREAST SURGERY  08/01/2010    mastectomy   • KNEE SURGERY     • MASTECTOMY Right 2010   • PUBOVAGINAL SLING N/A 01/07/2020    Procedure: PUBOVAGINAL SLING; POSTERIOR COLPORRHAPHY; ANTERIOR COLPORRHAPHY; ENTRAPERITONEAL COLPOPEXY SACROSPINUS LIGAMENT FIXATION; INSERTION OF VAGINAL GRAFT; CYSTOURETHROSCOPY;   Surgeon: Senia Frederick MD;  Location: [facility] MAIN OR;  Service: Gynecology   • REDUCTION MAMMAPLASTY Left    • TOTAL LAPAROSCOPIC HYSTERECTOMY, SACROCOLPOPEXY N/A 01/07/2020    Procedure: LAPAROSCOPIC UTEROSCRAL LIGAMENT SUSPENSION/SACRAL COLPOPEXY; LAPAROSCOPIC PARAVAGINAL REPAIR; TOTAL LAPAROSCPIC HYSTERECTOMY WITH BILATERAL SALPINGECTOMY;;  Surgeon: Senia Frederick MD;  Location: [facility] MAIN OR;  Service: Gynecology   • TUBAL ABDOMINAL LIGATION       Family History   Problem Relation Age of Onset   • COPD Mother    • Heart failure Mother    • Hypertension Mother    • Heart disease Mother    • Depression Mother    • Vision loss Mother    • Heart failure Father    • Hypertension Father    • Heart disease Father    • Vision loss Maternal Grandfather    • Malig Hyperthermia Neg Hx    • Breast cancer Neg Hx    • Ovarian cancer Neg Hx      Social History     Tobacco Use   • Smoking status: Never   • Smokeless tobacco: Never   Substance Use Topics   • Alcohol use: Yes     Comment: 1/day   • Drug use: No     No current facility-administered medications on file prior to encounter.     Current Outpatient Medications on File Prior to Encounter   Medication Sig Dispense Refill   • albuterol sulfate  (90 Base) MCG/ACT inhaler Inhale 2 puffs Every 4 (Four) Hours As Needed for Wheezing or Shortness of Air. 18 g 3   • aspirin 81 MG EC tablet Take 1 tablet by mouth Daily. HELD     • Calcium Carbonate-Vitamin D (CALCIUM-CARB 600 + D PO) Take 1 tablet by mouth Daily.     • hydroCHLOROthiazide (HYDRODIURIL) 25 MG tablet Take 1 tablet by mouth Daily. 90 tablet 3   • melatonin 3 MG tablet Take 1 tablet by mouth At Night As Needed for Sleep.     • Multiple Vitamin (MULTI VITAMIN DAILY PO) Take 1 tablet by mouth Daily.     • rosuvastatin (CRESTOR) 5 MG tablet Take 1 tablet by mouth Daily. 90 tablet 3   • TURMERIC PO Take 1 tablet by mouth Daily. HELD     • CRANBERRY PO Take 1 tablet by mouth Daily.     • potassium chloride  (K-DUR,KLOR-CON) 20 MEQ CR tablet Take 1 tablet by mouth Daily. 3 tablet 0     Allergies   Allergen Reactions   • Penicillins Unknown (See Comments)     UNSURE-AS A CHILD       Objective    Objective     Vital Signs  Temp:  [98.8 °F (37.1 °C)-99.2 °F (37.3 °C)] 98.8 °F (37.1 °C)  Heart Rate:  [] 117  Resp:  [18] 18  BP: (112-143)/(65-83) 137/83  SpO2:  [93 %-99 %] 99 %  on   ;   Device (Oxygen Therapy): room air  Body mass index is 25 kg/m².    Physical Exam  Vitals and nursing note reviewed.   Constitutional:       Appearance: Normal appearance.   HENT:      Head: Atraumatic.      Mouth/Throat:      Mouth: Mucous membranes are dry.   Eyes:      Conjunctiva/sclera: Conjunctivae normal.   Cardiovascular:      Rate and Rhythm: Tachycardia present.      Pulses: Normal pulses.           Radial pulses are 2+ on the right side and 2+ on the left side.        Dorsalis pedis pulses are 2+ on the right side and 2+ on the left side.        Posterior tibial pulses are 2+ on the right side and 2+ on the left side.      Heart sounds: Normal heart sounds.   Pulmonary:      Effort: Pulmonary effort is normal.      Breath sounds: Normal breath sounds.   Abdominal:      General: Bowel sounds are normal.      Palpations: Abdomen is soft.   Musculoskeletal:      Cervical back: Neck supple.      Right lower leg: No edema.      Left lower leg: No edema.   Skin:     General: Skin is warm and dry.      Capillary Refill: Capillary refill takes less than 2 seconds.   Neurological:      General: No focal deficit present.      Mental Status: She is alert and oriented to person, place, and time.   Psychiatric:         Mood and Affect: Mood normal.         Results Review:  I reviewed the patient's new clinical results.  I reviewed the patient's new imaging results and agree with the interpretation.  I reviewed the patient's other test results and agree with the interpretation  I personally viewed and interpreted the patient's  EKG/Telemetry data  Discussed with ED provider.    Lab Results (last 24 hours)     Procedure Component Value Units Date/Time    CBC & Differential [[id number]]  (Abnormal) Collected: 05/18/23 2012    Specimen: Blood Updated: 05/18/23 2028    Narrative:      The following orders were created for panel order CBC & Differential.  Procedure                               Abnormality         Status                     ---------                               -----------         ------                     CBC Auto Differential[[id number]]        Abnormal            Final result                 Please view results for these tests on the individual orders.    Comprehensive Metabolic Panel [[id number]]  (Abnormal) Collected: 05/18/23 2012    Specimen: Blood Updated: 05/18/23 2048     Glucose 133 mg/dL      BUN 22 mg/dL      Creatinine 1.22 mg/dL      Sodium 136 mmol/L      Potassium 3.1 mmol/L      Chloride 97 mmol/L      CO2 26.9 mmol/L      Calcium 9.8 mg/dL      Total Protein 7.4 g/dL      Albumin 4.4 g/dL      ALT (SGPT) 22 U/L      AST (SGOT) 25 U/L      Alkaline Phosphatase 86 U/L      Total Bilirubin 0.8 mg/dL      Globulin 3.0 gm/dL      A/G Ratio 1.5 g/dL      BUN/Creatinine Ratio 18.0     Anion Gap 12.1 mmol/L      eGFR 45.8 mL/min/1.73     Narrative:      GFR Normal >60  Chronic Kidney Disease <60  Kidney Failure <15    The GFR formula is only valid for adults with stable renal function between ages 18 and 70.    Protime-INR [[id number]]  (Normal) Collected: 05/18/23 2012    Specimen: Blood Updated: 05/18/23 2109     Protime 12.5 Seconds      INR 0.92    aPTT [[id number]]  (Normal) Collected: 05/18/23 2012    Specimen: Blood Updated: 05/18/23 2109     PTT 22.8 seconds     Single High Sensitivity Troponin T [[id number]]  (Abnormal) Collected: 05/18/23 2012    Specimen: Blood Updated: 05/18/23 2048     HS Troponin T 33 ng/L     Narrative:      High Sensitive Troponin T Reference Range:  <10.0 ng/L- Negative Female for  AMI  <15.0 ng/L- Negative Male for AMI  >=10 - Abnormal Female indicating possible myocardial injury.  >=15 - Abnormal Male indicating possible myocardial injury.   Clinicians would have to utilize clinical acumen, EKG, Troponin, and serial changes to determine if it is an Acute Myocardial Infarction or myocardial injury due to an underlying chronic condition.         BNP [[id number]]  (Normal) Collected: 05/18/23 2012    Specimen: Blood Updated: 05/18/23 2046     proBNP 296.0 pg/mL     Narrative:      Among patients with dyspnea, NT-proBNP is highly sensitive for the detection of acute congestive heart failure. In addition NT-proBNP of <300 pg/ml effectively rules out acute congestive heart failure with 99% negative predictive value.    Results may be falsely decreased if patient taking Biotin.      Magnesium [[id number]]  (Normal) Collected: 05/18/23 2012    Specimen: Blood Updated: 05/18/23 2048     Magnesium 2.0 mg/dL     Lactic Acid, Plasma [[id number]]  (Normal) Collected: 05/18/23 2012    Specimen: Blood Updated: 05/18/23 2110     Lactate 1.6 mmol/L     Blood Culture - Blood, Arm, Right [[id number]] Collected: 05/18/23 2012    Specimen: Blood from Arm, Right Updated: 05/18/23 2022    CBC Auto Differential [[id number]]  (Abnormal) Collected: 05/18/23 2012    Specimen: Blood Updated: 05/18/23 2028     WBC 21.98 10*3/mm3      RBC 4.30 10*6/mm3      Hemoglobin 13.8 g/dL      Hematocrit 39.6 %      MCV 92.1 fL      MCH 32.1 pg      MCHC 34.8 g/dL      RDW 13.1 %      RDW-SD 44.5 fl      MPV 10.9 fL      Platelets 234 10*3/mm3      Neutrophil % 92.0 %      Lymphocyte % 1.5 %      Monocyte % 5.3 %      Eosinophil % 0.4 %      Basophil % 0.3 %      Immature Grans % 0.5 %      Neutrophils, Absolute 20.21 10*3/mm3      Lymphocytes, Absolute 0.34 10*3/mm3      Monocytes, Absolute 1.17 10*3/mm3      Eosinophils, Absolute 0.08 10*3/mm3      Basophils, Absolute 0.06 10*3/mm3      Immature Grans, Absolute 0.12 10*3/mm3      nRBC  0.0 /100 WBC     Urinalysis With Culture If Indicated - Urine, Clean Catch [[id number]]  (Abnormal) Collected: 05/18/23 2032    Specimen: Urine, Clean Catch Updated: 05/18/23 2121     Color, UA Yellow     Appearance, UA Clear     pH, UA 7.0     Specific Gravity, UA 1.017     Glucose, UA Negative     Ketones, UA Trace     Bilirubin, UA Negative     Blood, UA Negative     Protein,  mg/dL (2+)     Leuk Esterase, UA Moderate (2+)     Nitrite, UA Negative     Urobilinogen, UA 0.2 E.U./dL    Narrative:      In absence of clinical symptoms, the presence of pyuria, bacteria, and/or nitrites on the urinalysis result does not correlate with infection.    Urinalysis, Microscopic Only - Urine, Clean Catch [[id number]]  (Abnormal) Collected: 05/18/23 2032    Specimen: Urine, Clean Catch Updated: 05/18/23 2200     RBC, UA 0-2 /HPF      WBC, UA 6-12 /HPF      Bacteria, UA None Seen /HPF      Squamous Epithelial Cells, UA 0-2 /HPF      Hyaline Casts, UA None Seen /LPF      Methodology Manual Light Microscopy    Blood Culture - Blood, Arm, Left [[id number]] Collected: 05/18/23 2052    Specimen: Blood from Arm, Left Updated: 05/18/23 2057    Urine Culture - Urine, Urine, Clean Catch [[id number]] Updated: 05/18/23 2219    Specimen: Urine, Clean Catch           Imaging Results (Last 24 Hours)     Procedure Component Value Units Date/Time    XR Chest 1 View [[id number]] Collected: 05/18/23 2017     Updated: 05/18/23 2129    Narrative:      EXAMINATION: SINGLE VIEW CHEST RADIOGRAPH     HISTORY: 78-year-old female with a history of syncope.      FINDINGS: An upright AP portable chest radiograph was obtained.  Comparison is made to a chest radiograph dated 01/03/2020. The lungs are  normal in volume and are clear. The cardiomediastinal silhouette,  pulmonary vasculature and osseous structures appear normal.       Impression:      Negative chest radiograph.     This report was finalized on 5/18/2023 9:26 PM by Dr. Gm Navarrete M.D.        CT Head Without Contrast [[id number]] Collected: 05/18/23 2047     Updated: 05/18/23 2052    Narrative:      CT HEAD WO CONTRAST-     INDICATIONS: Syncope     TECHNIQUE: Radiation dose reduction techniques were utilized, including  automated exposure control and exposure modulation based on body size.  Noncontrast head CT     COMPARISON: None available     FINDINGS:           No acute intracranial hemorrhage, midline shift or mass effect. No acute  territorial infarct is identified.     Ventricles, cisterns, cerebral sulci are unremarkable for patient age.     Right frontal sinus is hypoplastic. The visualized paranasal sinuses,  orbits, mastoid air cells are otherwise unremarkable.          Impression:         No acute intracranial hemorrhage or hydrocephalus. If there is further  clinical concern, MRI could be considered for further evaluation.     This report was finalized on 5/18/2023 8:49 PM by Dr. Margarito Tsai M.D.                 ECG 12 Lead Syncope   Preliminary Result   HEART RATE= 101  bpm   RR Interval= 594  ms   CO Interval= 149  ms   P Horizontal Axis= 176  deg   P Front Axis= 259  deg   QRSD Interval= 139  ms   QT Interval= 462  ms   QRS Axis= -42  deg   T Wave Axis= 76  deg   - ABNORMAL ECG -   Sinus or ectopic atrial tachycardia   Nonspecific IVCD with LAD   Inferior infarct, old   Lateral leads are also involved   Electronically Signed By:    Date and Time of Study: 2023-05-18 19:34:56           Assessment/Plan     Active Hospital Problems    Diagnosis  POA   • **Recurrent syncope [R55]  Yes   • Acute UTI (urinary tract infection) [N39.0]  Yes   • Stage 3 chronic kidney disease [N18.30]  Yes   • Essential hypertension [I10]  Yes      Resolved Hospital Problems   No resolved problems to display.     Recurrent syncope  Continuous cardiac monitoring pulse oximetry  Safety precautions  Check orthostatic blood pressures  Echocardiogram ordered  MRI of the head ordered  Vital signs per  policy  One-time dose ASA ordered  Consider cardiology consulted if clinically appropriate  Elevated troponin 33 at time of admission we will order recheck  On hydrochlorothiazide at home   Continue home statin    acute UTI (urinary tract infection)  Acute   Continue ceftriaxone  Monitor I's and O's  Was recently prescribed Macrobid by urology on May 16    Stage 3 chronic kidney disease  Chronic  Creatinine 1.2 to close to her baseline  Monitor I's and O's  Daily weights  Avoid nephrotoxic medications, hypovolemia, hypotension  Mild hypokalemia  on admission potassium 3.1 one-time dose ordered      · I discussed the patient's findings and my recommendations with patient and spouse.    VTE Prophylaxis - SCDs.  Code Status - Full code.       MAT Lazar  [facility]  05/19/23  02:08 EDT

## 2023-05-19 NOTE — ED NOTES
"..Nursing report ED to floor  Marcia Ulloa  78 y.o.  female    HPI :   Chief Complaint   Patient presents with    Syncope       Admitting doctor:   Boni Canales MD    Admitting diagnosis:   The primary encounter diagnosis was Recurrent syncope. Diagnoses of Acute UTI, Sepsis without acute organ dysfunction, due to unspecified organism, and Elevated troponin were also pertinent to this visit.    Code status:   Current Code Status       Date Active Code Status Order ID Comments User Context       Prior            Allergies:   Penicillins    Isolation:   No active isolations    Intake and Output    Intake/Output Summary (Last 24 hours) at 5/18/2023 2353  Last data filed at 5/18/2023 2330  Gross per 24 hour   Intake 100 ml   Output --   Net 100 ml       Weight:       05/18/23 2018   Weight: 68 kg (150 lb)       Most recent vitals:   Vitals:    05/18/23 1904 05/18/23 1917 05/18/23 2018 05/18/23 2230   BP: 140/75 143/78  112/65   BP Location: Right arm      Patient Position: Sitting      Pulse: 98 104  85   Resp: 18      Temp: 99.2 °F (37.3 °C)      TempSrc: Tympanic      SpO2: 96% 95%  93%   Weight:   68 kg (150 lb)    Height:   170 cm (66.93\")        Active LDAs/IV Access:   Lines, Drains & Airways       Active LDAs       Name Placement date Placement time Site Days    Peripheral IV 05/18/23 1903 Left Antecubital 05/18/23 1903  Antecubital  less than 1    Urethral Catheter Non-latex;Silicone 16 Fr. 01/08/20  1733  -- 1226                    Labs (abnormal labs have a star):   Labs Reviewed   COMPREHENSIVE METABOLIC PANEL - Abnormal; Notable for the following components:       Result Value    Glucose 133 (*)     Creatinine 1.22 (*)     Potassium 3.1 (*)     Chloride 97 (*)     eGFR 45.8 (*)     All other components within normal limits    Narrative:     GFR Normal >60  Chronic Kidney Disease <60  Kidney Failure <15    The GFR formula is only valid for adults with stable renal function between ages 18 and 70. "   URINALYSIS W/ CULTURE IF INDICATED - Abnormal; Notable for the following components:    Ketones, UA Trace (*)     Protein,  mg/dL (2+) (*)     Leuk Esterase, UA Moderate (2+) (*)     All other components within normal limits    Narrative:     In absence of clinical symptoms, the presence of pyuria, bacteria, and/or nitrites on the urinalysis result does not correlate with infection.   SINGLE HSTROPONIN T - Abnormal; Notable for the following components:    HS Troponin T 33 (*)     All other components within normal limits    Narrative:     High Sensitive Troponin T Reference Range:  <10.0 ng/L- Negative Female for AMI  <15.0 ng/L- Negative Male for AMI  >=10 - Abnormal Female indicating possible myocardial injury.  >=15 - Abnormal Male indicating possible myocardial injury.   Clinicians would have to utilize clinical acumen, EKG, Troponin, and serial changes to determine if it is an Acute Myocardial Infarction or myocardial injury due to an underlying chronic condition.        CBC WITH AUTO DIFFERENTIAL - Abnormal; Notable for the following components:    WBC 21.98 (*)     Neutrophil % 92.0 (*)     Lymphocyte % 1.5 (*)     Neutrophils, Absolute 20.21 (*)     Lymphocytes, Absolute 0.34 (*)     Monocytes, Absolute 1.17 (*)     Immature Grans, Absolute 0.12 (*)     All other components within normal limits   URINALYSIS, MICROSCOPIC ONLY - Abnormal; Notable for the following components:    WBC, UA 6-12 (*)     All other components within normal limits   PROTIME-INR - Normal   APTT - Normal   BNP (IN-HOUSE) - Normal    Narrative:     Among patients with dyspnea, NT-proBNP is highly sensitive for the detection of acute congestive heart failure. In addition NT-proBNP of <300 pg/ml effectively rules out acute congestive heart failure with 99% negative predictive value.    Results may be falsely decreased if patient taking Biotin.     MAGNESIUM - Normal   LACTIC ACID, PLASMA - Normal   BLOOD CULTURE   BLOOD CULTURE    URINE CULTURE   CBC AND DIFFERENTIAL    Narrative:     The following orders were created for panel order CBC & Differential.  Procedure                               Abnormality         Status                     ---------                               -----------         ------                     CBC Auto Differential[[id number]]        Abnormal            Final result                 Please view results for these tests on the individual orders.       EKG:   ECG 12 Lead Syncope   Preliminary Result   HEART RATE= 101  bpm   RR Interval= 594  ms   VA Interval= 149  ms   P Horizontal Axis= 176  deg   P Front Axis= 259  deg   QRSD Interval= 139  ms   QT Interval= 462  ms   QRS Axis= -42  deg   T Wave Axis= 76  deg   - ABNORMAL ECG -   Sinus or ectopic atrial tachycardia   Nonspecific IVCD with LAD   Inferior infarct, old   Lateral leads are also involved   Electronically Signed By:    Date and Time of Study: 2023-05-18 19:34:56          Meds given in ED:   Medications   sodium chloride 0.9 % flush 10 mL (has no administration in time range)   sodium chloride 0.9 % flush 10 mL (has no administration in time range)   sodium chloride 0.9 % infusion 40 mL (has no administration in time range)   nitroglycerin (NITROSTAT) SL tablet 0.4 mg (has no administration in time range)   aspirin tablet 325 mg (has no administration in time range)     Or   aspirin suppository 300 mg (has no administration in time range)   potassium chloride (K-DUR,KLOR-CON) ER tablet 40 mEq (has no administration in time range)   sodium chloride 0.9 % bolus 1,000 mL (1,000 mL Intravenous New Bag 5/18/23 2221)   cefTRIAXone (ROCEPHIN) 1 g in sodium chloride 0.9 % 100 mL IVPB-VTB (0 g Intravenous Stopped 5/18/23 2330)       Imaging results:  CT Head Without Contrast    Result Date: 5/18/2023   No acute intracranial hemorrhage or hydrocephalus. If there is further clinical concern, MRI could be considered for further evaluation.  This report was  finalized on 5/18/2023 8:49 PM by Dr. Margarito Tsai M.D.      XR Chest 1 View    Result Date: 5/18/2023  Negative chest radiograph.  This report was finalized on 5/18/2023 9:26 PM by Dr. Gm Navarrete M.D.       Ambulatory status:   - standby    Social issues:   Social History     Socioeconomic History    Marital status:    Tobacco Use    Smoking status: Never    Smokeless tobacco: Never   Substance and Sexual Activity    Alcohol use: Yes     Comment: 1/day    Drug use: No    Sexual activity: Never       NIH Stroke Scale:         Mago Cross RN  05/18/23 23:53 EDT

## 2023-05-19 NOTE — ED PROVIDER NOTES
MD ATTESTATION NOTE    The AMAURY and I have discussed this patient's history, physical exam, and treatment plan.  I have reviewed the documentation and personally had a face to face interaction with the patient. I affirm the documentation and agree with the treatment and plan.  The attached note describes my personal findings.      I provided a substantive portion of the care of the patient.  I personally performed the physical exam in its entirety, and below are my findings.  For this patient encounter, the patient wore surgical mask, I wore full protective PPE including N95 and eye protection.      Brief HPI: Patient presented via EMS from a local soccer field where she was watching her grandchildren play.  She had been feeling ill for the past couple of days and had been reportedly prescribed an antibiotic for UTI at her most recent urology office visit.  She says that she slept most of the day today and did not feel well, she had a poor appetite.  While walking across the soccer field this afternoon, she had a witnessed syncopal episode.  Paramedics were called to the scene.  And when they arrived, she had a second syncopal episode.  She denies any chest pain or abdominal pain.  She denies any difficulties breathing.    PHYSICAL EXAM  ED Triage Vitals [05/18/23 1904]   Temp Heart Rate Resp BP SpO2   99.2 °F (37.3 °C) 98 18 140/75 96 %      Temp src Heart Rate Source Patient Position BP Location FiO2 (%)   Tympanic Monitor Sitting Right arm --         GENERAL: Pleasant, older lady, calmly lying on the bed, no diaphoresis, no acute distress  HENT: nares patent, normocephalic, atraumatic  EYES: no scleral icterus, normal conjunctiva  CV: Normal pulses, normal rate  RESPIRATORY: normal effort, no stridor  ABDOMEN: soft, nontender in all quadrants  MUSCULOSKELETAL: no deformity  NEURO: alert, moves all extremities, follows commands  PSYCH:  calm, cooperative  SKIN: warm, dry    Vital signs and nursing notes  reviewed.        Plan: Patient with a notable elevation in white blood cell count and 2 syncopal episodes today.  Presentation is certainly worrisome for sepsis concerns.  Therefore we made arrangements to admit her to the hospitalist and begin IV antibiotics for presumptive UTI diagnosis.  Patient and her family were agreeable with this plan.     Mitchell Mayen MD  05/19/23 0032

## 2023-05-20 ENCOUNTER — READMISSION MANAGEMENT (OUTPATIENT)
Dept: CALL CENTER | Facility: HOSPITAL | Age: 78
End: 2023-05-20
Payer: MEDICARE

## 2023-05-20 VITALS
HEART RATE: 73 BPM | BODY MASS INDEX: 24.91 KG/M2 | DIASTOLIC BLOOD PRESSURE: 79 MMHG | WEIGHT: 158.73 LBS | OXYGEN SATURATION: 93 % | HEIGHT: 67 IN | SYSTOLIC BLOOD PRESSURE: 134 MMHG | RESPIRATION RATE: 18 BRPM | TEMPERATURE: 98.4 F

## 2023-05-20 LAB
ANION GAP SERPL CALCULATED.3IONS-SCNC: 10.5 MMOL/L (ref 5–15)
BH CV XLRA MEAS LEFT DIST CCA EDV: 8.8 CM/SEC
BH CV XLRA MEAS LEFT DIST CCA PSV: 50.2 CM/SEC
BH CV XLRA MEAS LEFT DIST ICA EDV: -22.6 CM/SEC
BH CV XLRA MEAS LEFT DIST ICA PSV: -86 CM/SEC
BH CV XLRA MEAS LEFT ICA/CCA RATIO: -1.71
BH CV XLRA MEAS LEFT MID ICA EDV: -18 CM/SEC
BH CV XLRA MEAS LEFT MID ICA PSV: -66.7 CM/SEC
BH CV XLRA MEAS LEFT PROX CCA EDV: 7.8 CM/SEC
BH CV XLRA MEAS LEFT PROX CCA PSV: 60.2 CM/SEC
BH CV XLRA MEAS LEFT PROX ECA EDV: 6.3 CM/SEC
BH CV XLRA MEAS LEFT PROX ECA PSV: 62.7 CM/SEC
BH CV XLRA MEAS LEFT PROX ICA EDV: -15.4 CM/SEC
BH CV XLRA MEAS LEFT PROX ICA PSV: -62.7 CM/SEC
BH CV XLRA MEAS LEFT PROX SCLA PSV: 115.6 CM/SEC
BH CV XLRA MEAS LEFT VERTEBRAL A EDV: 13.3 CM/SEC
BH CV XLRA MEAS LEFT VERTEBRAL A PSV: 55 CM/SEC
BH CV XLRA MEAS RIGHT DIST CCA EDV: 16.2 CM/SEC
BH CV XLRA MEAS RIGHT DIST CCA PSV: 59.3 CM/SEC
BH CV XLRA MEAS RIGHT DIST ICA EDV: -23.7 CM/SEC
BH CV XLRA MEAS RIGHT DIST ICA PSV: -76.8 CM/SEC
BH CV XLRA MEAS RIGHT ICA/CCA RATIO: -1.3
BH CV XLRA MEAS RIGHT MID ICA EDV: -18.9 CM/SEC
BH CV XLRA MEAS RIGHT MID ICA PSV: -68.5 CM/SEC
BH CV XLRA MEAS RIGHT PROX CCA EDV: 9.7 CM/SEC
BH CV XLRA MEAS RIGHT PROX CCA PSV: 50.7 CM/SEC
BH CV XLRA MEAS RIGHT PROX ECA EDV: 8.4 CM/SEC
BH CV XLRA MEAS RIGHT PROX ECA PSV: 60.9 CM/SEC
BH CV XLRA MEAS RIGHT PROX ICA EDV: -14.9 CM/SEC
BH CV XLRA MEAS RIGHT PROX ICA PSV: -61.5 CM/SEC
BH CV XLRA MEAS RIGHT PROX SCLA PSV: 127 CM/SEC
BH CV XLRA MEAS RIGHT VERTEBRAL A EDV: 10.5 CM/SEC
BH CV XLRA MEAS RIGHT VERTEBRAL A PSV: 56.2 CM/SEC
BUN SERPL-MCNC: 26 MG/DL (ref 8–23)
BUN/CREAT SERPL: 21.5 (ref 7–25)
CALCIUM SPEC-SCNC: 8.9 MG/DL (ref 8.6–10.5)
CHLORIDE SERPL-SCNC: 105 MMOL/L (ref 98–107)
CO2 SERPL-SCNC: 26.5 MMOL/L (ref 22–29)
CREAT SERPL-MCNC: 1.21 MG/DL (ref 0.57–1)
DEPRECATED RDW RBC AUTO: 44.2 FL (ref 37–54)
EGFRCR SERPLBLD CKD-EPI 2021: 46.3 ML/MIN/1.73
ERYTHROCYTE [DISTWIDTH] IN BLOOD BY AUTOMATED COUNT: 12.9 % (ref 12.3–15.4)
GLUCOSE SERPL-MCNC: 101 MG/DL (ref 65–99)
HCT VFR BLD AUTO: 34.8 % (ref 34–46.6)
HGB BLD-MCNC: 11.8 G/DL (ref 12–15.9)
MAXIMAL PREDICTED HEART RATE: 143 BPM
MCH RBC QN AUTO: 31.5 PG (ref 26.6–33)
MCHC RBC AUTO-ENTMCNC: 33.9 G/DL (ref 31.5–35.7)
MCV RBC AUTO: 92.8 FL (ref 79–97)
PLATELET # BLD AUTO: 213 10*3/MM3 (ref 140–450)
PMV BLD AUTO: 11.1 FL (ref 6–12)
POTASSIUM SERPL-SCNC: 3.6 MMOL/L (ref 3.5–5.2)
RBC # BLD AUTO: 3.75 10*6/MM3 (ref 3.77–5.28)
SODIUM SERPL-SCNC: 142 MMOL/L (ref 136–145)
STRESS TARGET HR: 122 BPM
WBC NRBC COR # BLD: 8 10*3/MM3 (ref 3.4–10.8)

## 2023-05-20 PROCEDURE — 85027 COMPLETE CBC AUTOMATED: CPT | Performed by: HOSPITALIST

## 2023-05-20 PROCEDURE — G0378 HOSPITAL OBSERVATION PER HR: HCPCS

## 2023-05-20 PROCEDURE — 80048 BASIC METABOLIC PNL TOTAL CA: CPT | Performed by: HOSPITALIST

## 2023-05-20 RX ORDER — AMLODIPINE BESYLATE 5 MG/1
2.5 TABLET ORAL DAILY
Qty: 30 TABLET | Refills: 0 | Status: SHIPPED | OUTPATIENT
Start: 2023-05-20

## 2023-05-20 RX ADMIN — ASPIRIN 81 MG: 81 TABLET, COATED ORAL at 08:04

## 2023-05-20 RX ADMIN — Medication 10 ML: at 08:04

## 2023-05-20 RX ADMIN — ROSUVASTATIN CALCIUM 5 MG: 5 TABLET, FILM COATED ORAL at 08:04

## 2023-05-20 NOTE — PLAN OF CARE
Goal Outcome Evaluation:  Plan of Care Reviewed With: patient        Progress: no change  Outcome Evaluation: Alert and orientedx4 but forgetful. OOB to chair. Up with assist x1 to BR. All meds given as ordered. VSS. NAD. Will CTM.

## 2023-05-20 NOTE — DISCHARGE SUMMARY
[facility]    Date of Discharge:  5/20/2023    PCP: Bk Mendez MD    Discharge Diagnosis:   Active Hospital Problems    Diagnosis  POA   • **Recurrent syncope [R55]  Yes   • Acute UTI (urinary tract infection) [N39.0]  Yes   • Stage 3 chronic kidney disease [N18.30]  Yes   • Essential hypertension [I10]  Yes      Resolved Hospital Problems   No resolved problems to display.          Consults     Date and Time Order Name Status Description    5/18/2023  9:45 PM [facility] (on-call MD unless specified) Details          Hospital Course  78 y.o. female initially admitted by myself due to near syncope.  I think this is multifactorial due to some mild dehydration compounded by hydrochlorothiazide as well as recent initiation of Macrobid and its side effect profile in conjunction with baseline CKD 3A.  The patient did not have full syncope and we held her hydrochlorothiazide while here and I think given her creatinine clearance, this is likely becoming not an ideal drug for further BP management.  She should likely have consideration of transition towards loop diuretic in the future but she tells me she has no issues with volume overload.  I discussed the pros and cons of additional blood pressure medications with the patient given her kidney function and normally and like to start an ACE or an ARB but I am going to defer that to PCP and in the meantime and when to add very low-dose amlodipine at 2.5 mg to avoid any possibly renal insulting medications even though that ACE/ARB should be renal protective as well.  Her blood pressures remain therapeutic despite removal of amlodipine.  CT of the head was negative for any acute disease and the patient has been neurologically intact so no further MRI was employed.  Echocardiogram was obtained as well as carotid Doppler.  Carotid Doppler report is pending and I will defer to PCP to follow-up on that in an outpatient  setting.  Echocardiogram showed normal EF but demonstrated changes due to hypertension with grade 1 diastolic dysfunction.  See reports for further clarification.  She had no elevated troponin or EKG changes that she is never complained of any chest pain, telemetry also remained unremarkable.  Her urinary tract infection was treated with 2 more doses of IV Rocephin and she already had a day and has worsened Macrobid prior to admission so no additional antibiotics are warranted.  She clinically feels back to baseline.  Vital signs are stable as well as afebrile.  She is thankful for the care she received while here as well as amenable to the above plan for discharge and denies any additional discharge needs      Addendum -carotid Dopplers without hemodynamic stenotic lesion      Temp:  [98.1 °F (36.7 °C)-99.4 °F (37.4 °C)] 98.4 °F (36.9 °C)  Heart Rate:  [73-84] 73  Resp:  [18] 18  BP: (124-168)/(74-83) 134/79  Body mass index is 24.91 kg/m².    Physical Exam  HENT:      Head: Normocephalic.      Nose: Nose normal.      Mouth/Throat:      Mouth: Mucous membranes are moist.      Pharynx: Oropharynx is clear.   Eyes:      General: No scleral icterus.     Extraocular Movements: Extraocular movements intact.      Conjunctiva/sclera: Conjunctivae normal.      Pupils: Pupils are equal, round, and reactive to light.   Cardiovascular:      Rate and Rhythm: Normal rate and regular rhythm.   Pulmonary:      Effort: Pulmonary effort is normal. No respiratory distress.      Breath sounds: Normal breath sounds.   Abdominal:      General: Bowel sounds are normal.      Palpations: Abdomen is soft.   Musculoskeletal:         General: No swelling.   Skin:     General: Skin is warm and dry.   Neurological:      Mental Status: She is alert. Mental status is at baseline.      Cranial Nerves: No cranial nerve deficit.      Motor: No weakness.      Gait: Gait normal.   Psychiatric:         Mood and Affect: Mood normal.         Behavior:  Behavior normal.         Thought Content: Thought content normal.         Judgment: Judgment normal.       Disposition: Home or Self Care       Discharge Medications      New Medications      Instructions Start Date   amLODIPine 5 MG tablet  Commonly known as: NORVASC   2.5 mg, Oral, Daily         Continue These Medications      Instructions Start Date   albuterol sulfate  (90 Base) MCG/ACT inhaler  Commonly known as: PROVENTIL HFA;VENTOLIN HFA;PROAIR HFA   2 puffs, Inhalation, Every 4 Hours PRN      aspirin 81 MG EC tablet   81 mg, Oral, Daily, HELD      CALCIUM-CARB 600 + D PO   1 tablet, Oral, Daily      CRANBERRY PO   1 tablet, Oral, Daily      melatonin 3 MG tablet   3 mg, Oral, Nightly PRN      multivitamin tablet tablet  Commonly known as: THERAGRAN   1 tablet, Oral, Daily      rosuvastatin 5 MG tablet  Commonly known as: CRESTOR   5 mg, Oral, Daily      TURMERIC PO   1 tablet, Oral, Daily, HELD         Stop These Medications    hydroCHLOROthiazide 25 MG tablet  Commonly known as: HYDRODIURIL     potassium chloride 20 MEQ CR tablet  Commonly known as: K-DUR,KLOR-CON             Additional Instructions for the Follow-ups that You Need to Schedule     Discharge Follow-up with PCP   As directed       Currently Documented PCP:    Bk Mendez MD    PCP Phone Number:    [phone]     Follow Up Details: pcp 2-3 weeks            Follow-up Information     Bk Mendez MD .    Specialties: Family Medicine, Urgent Care, Emergency Medicine  Why: pcp 2-3 weeks  Contact information:  [address] [address]  [phone]                        Future Appointments   Date Time Provider Department Center   6/9/2023  8:15 AM Bk Mendez MD [facility]     Pending Labs     Order Current Status    Blood Culture - Blood, Arm, Left Preliminary result    Blood Culture - Blood, Arm, Right Preliminary result         Ramone Mauro MD  [facility]  Sarah  05/20/23    Discharge time spent greater than 30 minutes.

## 2023-05-20 NOTE — PLAN OF CARE
Goal Outcome Evaluation:  Plan of Care Reviewed With: patient        Progress: improving  Outcome Evaluation: VSS, on RA. Patient states she feels back to baseline and would like to go home. Plan to d/c today.

## 2023-05-20 NOTE — OUTREACH NOTE
Prep Survey    Flowsheet Row Responses   [facility] patient discharged from? [place]   Is LACE score < 7 ? Yes   Eligibility [facility]   Date of Admission 05/18/23   Date of Discharge 05/20/23   Discharge Disposition Home or Self Care   Discharge diagnosis Recurrent syncope   Does the patient have one of the following disease processes/diagnoses(primary or secondary)? Other   Does the patient have Home health ordered? No   Is there a DME ordered? No   Prep survey completed? Yes          Debra STAHL - Registered Nurse

## 2023-05-22 ENCOUNTER — TRANSITIONAL CARE MANAGEMENT TELEPHONE ENCOUNTER (OUTPATIENT)
Dept: CALL CENTER | Facility: HOSPITAL | Age: 78
End: 2023-05-22
Payer: MEDICARE

## 2023-05-22 NOTE — OUTREACH NOTE
Call Center TCM Note    Flowsheet Row Responses   [facility] patient discharged from? [place]   Does the patient have one of the following disease processes/diagnoses(primary or secondary)? Other   TCM attempt successful? Yes   Call start time 1012   Call end time 1014   Meds reviewed with patient/caregiver? Yes   Is the patient having any side effects they believe may be caused by any medication additions or changes? No   Does the patient have all medications ordered at discharge? Yes   Is the patient taking all medications as directed (includes completed medication regime)? Yes   Does the patient have an appointment with their PCP within 7 days of discharge? No   Nursing Interventions Patient declined scheduling/rescheduling appointment at this time   Has home health visited the patient within 72 hours of discharge? N/A   Psychosocial issues? No   Did the patient receive a copy of their discharge instructions? Yes   Nursing interventions Reviewed instructions with patient   What is the patient's perception of their health status since discharge? Improving   Is the patient/caregiver able to teach back signs and symptoms related to disease process for when to call PCP? Yes   Is the patient/caregiver able to teach back signs and symptoms related to disease process for when to call 911? Yes   Is the patient/caregiver able to teach back the hierarchy of who to call/visit for symptoms/problems? PCP, Specialist, Home health nurse, Urgent Care, ED, 911 Yes   TCM call completed? Yes   Call end time 1014   Would this patient benefit from a Referral to Amb Social Work? No   Is the patient interested in additional calls from an ambulatory ?  NOTE:  applies to high risk patients requiring additional follow-up. No          Payton Otriz LPN    5/22/2023, 10:19 EDT

## 2023-05-22 NOTE — CASE MANAGEMENT/SOCIAL WORK
Case Management Discharge Note      Final Note: Discharge to home. AVERY AGUIRRE CCP         Selected Continued Care - Discharged on 5/20/2023 Admission date: 5/18/2023 - Discharge disposition: Home or Self Care    Destination    No services have been selected for the patient.              Durable Medical Equipment    No services have been selected for the patient.              Dialysis/Infusion    No services have been selected for the patient.              Home Medical Care    No services have been selected for the patient.              Therapy    No services have been selected for the patient.              Community Resources    No services have been selected for the patient.              Community & DME    No services have been selected for the patient.                  Transportation Services  Private: Car    Final Discharge Disposition Code: 01 - home or self-care

## 2023-05-23 LAB
BACTERIA SPEC AEROBE CULT: NORMAL
BACTERIA SPEC AEROBE CULT: NORMAL

## 2023-06-08 ENCOUNTER — OFFICE VISIT (OUTPATIENT)
Dept: INTERNAL MEDICINE | Facility: CLINIC | Age: 78
End: 2023-06-08
Payer: MEDICARE

## 2023-06-08 VITALS
DIASTOLIC BLOOD PRESSURE: 78 MMHG | RESPIRATION RATE: 16 BRPM | HEART RATE: 72 BPM | OXYGEN SATURATION: 97 % | BODY MASS INDEX: 24.64 KG/M2 | WEIGHT: 157 LBS | SYSTOLIC BLOOD PRESSURE: 126 MMHG | HEIGHT: 67 IN

## 2023-06-08 DIAGNOSIS — R32 URINARY INCONTINENCE, UNSPECIFIED TYPE: Primary | ICD-10-CM

## 2023-06-08 DIAGNOSIS — I10 ESSENTIAL HYPERTENSION: ICD-10-CM

## 2023-06-08 DIAGNOSIS — R06.2 WHEEZING: ICD-10-CM

## 2023-06-08 DIAGNOSIS — R41.3 MEMORY DEFICIT: ICD-10-CM

## 2023-06-08 PROCEDURE — 1160F RVW MEDS BY RX/DR IN RCRD: CPT | Performed by: FAMILY MEDICINE

## 2023-06-08 PROCEDURE — 99214 OFFICE O/P EST MOD 30 MIN: CPT | Performed by: FAMILY MEDICINE

## 2023-06-08 PROCEDURE — 3078F DIAST BP <80 MM HG: CPT | Performed by: FAMILY MEDICINE

## 2023-06-08 PROCEDURE — 3074F SYST BP LT 130 MM HG: CPT | Performed by: FAMILY MEDICINE

## 2023-06-08 PROCEDURE — 1159F MED LIST DOCD IN RCRD: CPT | Performed by: FAMILY MEDICINE

## 2023-06-08 RX ORDER — ALBUTEROL SULFATE 90 UG/1
2 AEROSOL, METERED RESPIRATORY (INHALATION) EVERY 4 HOURS PRN
Qty: 18 G | Refills: 3 | Status: SHIPPED | OUTPATIENT
Start: 2023-06-08

## 2023-06-08 RX ORDER — AMLODIPINE BESYLATE 5 MG/1
2.5 TABLET ORAL DAILY
Qty: 90 TABLET | Refills: 2 | Status: SHIPPED | OUTPATIENT
Start: 2023-06-08

## 2023-06-08 NOTE — PROGRESS NOTES
Chief Complaint  Follow-up (Last time she was here was October), Difficulty Urinating (Might have infection as well), and Wheezing    Subjective          Marcia Ulloa presents to [facility]  History of Present Illness    Ms. Ulloa is a 78-year-old female who presents for a 3 month follow up. She is accompanied by her . She is here to discuss difficulty urinating, and anything else she has on her mind.    The patient is experiencing difficulty urinating. Dr. Frederick, the urogynecologist, did surgery in 2020. The surgery has failed, so she can no longer urinate without help. She did get a piece of plastic placed by Dr Shelton Sutton's assistant. Since having the plastic placed urinating has not been a problem, but bowel movements are harder. She was informed it would be 7 years before the surgery would change anything. She is not sure if  she is willing to wait. She had knee surgery and did rehab afterwards. She was informed the rehab is likely what caused her problems with urinating. She is suppose to go once a month to check if she has a urinary tract infection..    One of the reasons she was in the hospital is she was taking one of the medications she prescribed her and it caused fatigued a dizziness. She fell down while she was watching her grandson play soccer. She went to the hospital by ambulance, but she did not feel bad.     They would like to change her blood pressure medication, and she has been taking amlodipine 2.5 mg daily. She did discontinue the other medication. She is checking her blood pressure in the morning prior to taking the medication. Her blood pressure readings show a couple of elevated days, but typically they have been in the 130s mmHg or 120s mmHg systolic and the diastolic has constantly been in the 70s mmHg. She is averaging 131/74 mmHg. Her heart rate has scared her a couple of times.     When she last saw the nephrologist, she was informed her kidney  "function was excellent.    She inquires about a probiotic her sister recommended for helping with her urination.    She feels her memory is \"slipping.\" [place] They did not stay at their other place as long last year. She noticed a change in her memory when bird watching. She can remember what she had for dinner the night before, and conversations with others. She is trying to drink more water and is working on not having caffeine after 3:00 PM. She does drive but can get around town fine.    She is wheezing more than usual. She uses the albuterol inhaler around 4 times a week. She does try to walk daily.     Objective   Vital Signs:   /78   Pulse 72   Resp 16   Ht 170 cm (66.93\")   Wt 71.2 kg (157 lb)   SpO2 97%   BMI 24.64 kg/m²     A review of systems was performed, and the pertinent positives are noted in the HPI.    Physical Exam  Vitals and nursing note reviewed.   Constitutional:       Appearance: She is well-developed.   HENT:      Head: Normocephalic and atraumatic.   Musculoskeletal:      Cervical back: Normal range of motion and neck supple.   Neurological:      Mental Status: She is alert and oriented to person, place, and time.   Psychiatric:         Behavior: Behavior normal.      Result Review :                 Assessment and Plan    Diagnoses and all orders for this visit:    1. Urinary incontinence, unspecified type (Primary)    2. Essential hypertension  -     amLODIPine (NORVASC) 5 MG tablet; Take 0.5 tablets by mouth Daily.  Dispense: 90 tablet; Refill: 2  -     Comprehensive Metabolic Panel  -     CBC & Differential    3. Memory deficit  -     Ambulatory Referral to Neurology    4. Wheezing  -     albuterol sulfate  (90 Base) MCG/ACT inhaler; Inhale 2 puffs Every 4 (Four) Hours As Needed for Wheezing or Shortness of Air.  Dispense: 18 g; Refill: 3      1. Chronic kidney disease  - We discussed Farxiga.  -Labs will be obtained.    2. Memory deficit  -The patient " will be referred to neurology.    3. Wheezing  -She will continue to use her albuterol inhaler.    She will return to the clinic in 1 month.      Follow Up   No follow-ups on file.  Patient was given instructions and counseling regarding her condition or for health maintenance advice. Please see specific information pulled into the AVS if appropriate.         Transcribed from ambient dictation for Bk Mendez MD by Alanis Galvez.  06/08/23   10:05 EDT    Patient or patient representative verbalized consent to the visit recording.  I have personally performed the services described in this document as transcribed by the above individual, and it is both accurate and complete.

## 2023-06-19 DIAGNOSIS — E78.49 OTHER HYPERLIPIDEMIA: ICD-10-CM

## 2023-06-19 RX ORDER — ROSUVASTATIN CALCIUM 5 MG/1
5 TABLET, COATED ORAL DAILY
Qty: 90 TABLET | Refills: 3 | Status: SHIPPED | OUTPATIENT
Start: 2023-06-19

## 2023-06-30 ENCOUNTER — TELEPHONE (OUTPATIENT)
Dept: INTERNAL MEDICINE | Facility: CLINIC | Age: 78
End: 2023-06-30

## 2023-06-30 NOTE — TELEPHONE ENCOUNTER
Caller:     Relationship:     Best call back number: [phone]    What is the best time to reach you: ANYTIME     Who are you requesting to speak with (clinical staff, provider,  specific staff member): CLINICAL STAFF     Do you know the name of the person who called: SELF     What was the call regarding: PATIENT CALLED AND STATED DOES SHE CONTINUE TO TAKE NEW BLOOD PRESSURE MEDICATION OR OLD BLOOD PRESSURE MEDICATION .PLEASE ADVISE    Is it okay if the provider responds through MyChart: NO

## 2023-07-12 ENCOUNTER — APPOINTMENT (OUTPATIENT)
Dept: CT IMAGING | Facility: HOSPITAL | Age: 78
DRG: 872 | End: 2023-07-12
Payer: MEDICARE

## 2023-07-12 ENCOUNTER — HOSPITAL ENCOUNTER (INPATIENT)
Facility: HOSPITAL | Age: 78
LOS: 3 days | Discharge: HOME | DRG: 872 | End: 2023-07-15
Attending: EMERGENCY MEDICINE | Admitting: INTERNAL MEDICINE
Payer: MEDICARE

## 2023-07-12 DIAGNOSIS — K52.9 COLITIS: Primary | ICD-10-CM

## 2023-07-12 DIAGNOSIS — A04.72 C. DIFFICILE COLITIS: ICD-10-CM

## 2023-07-12 DIAGNOSIS — R19.7 DIARRHEA OF PRESUMED INFECTIOUS ORIGIN: ICD-10-CM

## 2023-07-12 LAB
ADV 40+41 DNA STL QL NAA+NON-PROBE: NOT DETECTED
ALBUMIN SERPL-MCNC: 3.9 G/DL (ref 3.5–5.2)
ALBUMIN/GLOB SERPL: 1.4 G/DL
ALP SERPL-CCNC: 94 U/L (ref 39–117)
ALT SERPL W P-5'-P-CCNC: 11 U/L (ref 1–33)
ANION GAP SERPL CALCULATED.3IONS-SCNC: 9.5 MMOL/L (ref 5–15)
AST SERPL-CCNC: 16 U/L (ref 1–32)
ASTRO TYP 1-8 RNA STL QL NAA+NON-PROBE: NOT DETECTED
BASOPHILS # BLD AUTO: 0.05 10*3/MM3 (ref 0–0.2)
BASOPHILS NFR BLD AUTO: 0.4 % (ref 0–1.5)
BILIRUB SERPL-MCNC: 0.6 MG/DL (ref 0–1.2)
BILIRUB UR QL STRIP: NEGATIVE
BUN SERPL-MCNC: 17 MG/DL (ref 8–23)
BUN/CREAT SERPL: 14.3 (ref 7–25)
C CAYETANENSIS DNA STL QL NAA+NON-PROBE: NOT DETECTED
C COLI+JEJ+UPSA DNA STL QL NAA+NON-PROBE: NOT DETECTED
C DIFF GDH + TOXINS A+B STL QL IA.RAPID: NEGATIVE
C DIFF TOX GENS STL QL NAA+PROBE: POSITIVE
CALCIUM SPEC-SCNC: 10.1 MG/DL (ref 8.6–10.5)
CHLORIDE SERPL-SCNC: 107 MMOL/L (ref 98–107)
CLARITY UR: CLEAR
CO2 SERPL-SCNC: 21.5 MMOL/L (ref 22–29)
COLOR UR: YELLOW
CREAT SERPL-MCNC: 1.19 MG/DL (ref 0.57–1)
CRYPTOSP DNA STL QL NAA+NON-PROBE: NOT DETECTED
D-LACTATE SERPL-SCNC: 2.1 MMOL/L (ref 0.5–2)
D-LACTATE SERPL-SCNC: 2.7 MMOL/L (ref 0.5–2)
D-LACTATE SERPL-SCNC: 3.1 MMOL/L (ref 0.5–2)
DEPRECATED RDW RBC AUTO: 50.2 FL (ref 37–54)
E HISTOLYT DNA STL QL NAA+NON-PROBE: NOT DETECTED
EAEC PAA PLAS AGGR+AATA ST NAA+NON-PRB: NOT DETECTED
EC STX1+STX2 GENES STL QL NAA+NON-PROBE: NOT DETECTED
EGFRCR SERPLBLD CKD-EPI 2021: 47.2 ML/MIN/1.73
EOSINOPHIL # BLD AUTO: 0.16 10*3/MM3 (ref 0–0.4)
EOSINOPHIL NFR BLD AUTO: 1.1 % (ref 0.3–6.2)
EPEC EAE GENE STL QL NAA+NON-PROBE: NOT DETECTED
ERYTHROCYTE [DISTWIDTH] IN BLOOD BY AUTOMATED COUNT: 14 % (ref 12.3–15.4)
ETEC LTA+ST1A+ST1B TOX ST NAA+NON-PROBE: NOT DETECTED
G LAMBLIA DNA STL QL NAA+NON-PROBE: NOT DETECTED
GLOBULIN UR ELPH-MCNC: 2.7 GM/DL
GLUCOSE SERPL-MCNC: 164 MG/DL (ref 65–99)
GLUCOSE UR STRIP-MCNC: NEGATIVE MG/DL
HCT VFR BLD AUTO: 42 % (ref 34–46.6)
HGB BLD-MCNC: 13.3 G/DL (ref 12–15.9)
HGB UR QL STRIP.AUTO: NEGATIVE
IMM GRANULOCYTES # BLD AUTO: 0.03 10*3/MM3 (ref 0–0.05)
IMM GRANULOCYTES NFR BLD AUTO: 0.2 % (ref 0–0.5)
KETONES UR QL STRIP: NEGATIVE
LEUKOCYTE ESTERASE UR QL STRIP.AUTO: NEGATIVE
LIPASE SERPL-CCNC: 68 U/L (ref 13–60)
LYMPHOCYTES # BLD AUTO: 1.49 10*3/MM3 (ref 0.7–3.1)
LYMPHOCYTES NFR BLD AUTO: 10.6 % (ref 19.6–45.3)
MCH RBC QN AUTO: 30.4 PG (ref 26.6–33)
MCHC RBC AUTO-ENTMCNC: 31.7 G/DL (ref 31.5–35.7)
MCV RBC AUTO: 96.1 FL (ref 79–97)
MONOCYTES # BLD AUTO: 0.86 10*3/MM3 (ref 0.1–0.9)
MONOCYTES NFR BLD AUTO: 6.1 % (ref 5–12)
NEUTROPHILS NFR BLD AUTO: 11.43 10*3/MM3 (ref 1.7–7)
NEUTROPHILS NFR BLD AUTO: 81.6 % (ref 42.7–76)
NITRITE UR QL STRIP: NEGATIVE
NOROVIRUS GI+II RNA STL QL NAA+NON-PROBE: NOT DETECTED
P SHIGELLOIDES DNA STL QL NAA+NON-PROBE: NOT DETECTED
PH UR STRIP.AUTO: 7 [PH] (ref 5–8)
PLATELET # BLD AUTO: 253 10*3/MM3 (ref 140–450)
PMV BLD AUTO: 10.8 FL (ref 6–12)
POTASSIUM SERPL-SCNC: 3.7 MMOL/L (ref 3.5–5.2)
PROT SERPL-MCNC: 6.6 G/DL (ref 6–8.5)
PROT UR QL STRIP: NEGATIVE
RBC # BLD AUTO: 4.37 10*6/MM3 (ref 3.77–5.28)
RVA RNA STL QL NAA+NON-PROBE: NOT DETECTED
S ENT+BONG DNA STL QL NAA+NON-PROBE: NOT DETECTED
SAPO I+II+IV+V RNA STL QL NAA+NON-PROBE: NOT DETECTED
SHIGELLA SP+EIEC IPAH ST NAA+NON-PROBE: NOT DETECTED
SODIUM SERPL-SCNC: 138 MMOL/L (ref 136–145)
SP GR UR STRIP: 1.01 (ref 1–1.03)
UROBILINOGEN UR QL STRIP: NORMAL
V CHOL+PARA+VUL DNA STL QL NAA+NON-PROBE: NOT DETECTED
V CHOLERAE DNA STL QL NAA+NON-PROBE: NOT DETECTED
WBC NRBC COR # BLD: 14.02 10*3/MM3 (ref 3.4–10.8)
Y ENTEROCOL DNA STL QL NAA+NON-PROBE: NOT DETECTED

## 2023-07-12 PROCEDURE — 25510000001 IOPAMIDOL PER 1 ML: Performed by: EMERGENCY MEDICINE

## 2023-07-12 PROCEDURE — 87449 NOS EACH ORGANISM AG IA: CPT | Performed by: EMERGENCY MEDICINE

## 2023-07-12 PROCEDURE — 99285 EMERGENCY DEPT VISIT HI MDM: CPT

## 2023-07-12 PROCEDURE — 87507 IADNA-DNA/RNA PROBE TQ 12-25: CPT | Performed by: EMERGENCY MEDICINE

## 2023-07-12 PROCEDURE — 83690 ASSAY OF LIPASE: CPT | Performed by: EMERGENCY MEDICINE

## 2023-07-12 PROCEDURE — 0 METRONIDAZOLE 500 MG/100ML SOLUTION: Performed by: EMERGENCY MEDICINE

## 2023-07-12 PROCEDURE — 87493 C DIFF AMPLIFIED PROBE: CPT | Performed by: EMERGENCY MEDICINE

## 2023-07-12 PROCEDURE — 99285 EMERGENCY DEPT VISIT HI MDM: CPT | Performed by: EMERGENCY MEDICINE

## 2023-07-12 PROCEDURE — 85025 COMPLETE CBC W/AUTO DIFF WBC: CPT | Performed by: EMERGENCY MEDICINE

## 2023-07-12 PROCEDURE — 36415 COLL VENOUS BLD VENIPUNCTURE: CPT

## 2023-07-12 PROCEDURE — 81003 URINALYSIS AUTO W/O SCOPE: CPT | Performed by: EMERGENCY MEDICINE

## 2023-07-12 PROCEDURE — 74177 CT ABD & PELVIS W/CONTRAST: CPT

## 2023-07-12 PROCEDURE — 80053 COMPREHEN METABOLIC PANEL: CPT | Performed by: EMERGENCY MEDICINE

## 2023-07-12 PROCEDURE — 83605 ASSAY OF LACTIC ACID: CPT | Performed by: EMERGENCY MEDICINE

## 2023-07-12 PROCEDURE — 25010000002 ONDANSETRON PER 1 MG: Performed by: EMERGENCY MEDICINE

## 2023-07-12 PROCEDURE — 87040 BLOOD CULTURE FOR BACTERIA: CPT | Performed by: EMERGENCY MEDICINE

## 2023-07-12 RX ORDER — FAMOTIDINE 10 MG/ML
20 INJECTION, SOLUTION INTRAVENOUS ONCE
Status: COMPLETED | OUTPATIENT
Start: 2023-07-12 | End: 2023-07-12

## 2023-07-12 RX ORDER — METRONIDAZOLE 500 MG/100ML
500 INJECTION, SOLUTION INTRAVENOUS ONCE
Status: COMPLETED | OUTPATIENT
Start: 2023-07-12 | End: 2023-07-12

## 2023-07-12 RX ORDER — SODIUM CHLORIDE 0.9 % (FLUSH) 0.9 %
10 SYRINGE (ML) INJECTION AS NEEDED
Status: DISCONTINUED | OUTPATIENT
Start: 2023-07-12 | End: 2023-07-15 | Stop reason: HOSPADM

## 2023-07-12 RX ORDER — SODIUM CHLORIDE, SODIUM LACTATE, POTASSIUM CHLORIDE, CALCIUM CHLORIDE 600; 310; 30; 20 MG/100ML; MG/100ML; MG/100ML; MG/100ML
100 INJECTION, SOLUTION INTRAVENOUS CONTINUOUS
Status: ACTIVE | OUTPATIENT
Start: 2023-07-12 | End: 2023-07-13

## 2023-07-12 RX ORDER — ONDANSETRON 2 MG/ML
4 INJECTION INTRAMUSCULAR; INTRAVENOUS ONCE
Status: COMPLETED | OUTPATIENT
Start: 2023-07-12 | End: 2023-07-12

## 2023-07-12 RX ORDER — METRONIDAZOLE 500 MG/100ML
500 INJECTION, SOLUTION INTRAVENOUS EVERY 8 HOURS SCHEDULED
Status: COMPLETED | OUTPATIENT
Start: 2023-07-12 | End: 2023-07-13

## 2023-07-12 RX ADMIN — SODIUM CHLORIDE, POTASSIUM CHLORIDE, SODIUM LACTATE AND CALCIUM CHLORIDE 100 ML/HR: 600; 310; 30; 20 INJECTION, SOLUTION INTRAVENOUS at 14:03

## 2023-07-12 RX ADMIN — FAMOTIDINE 20 MG: 10 INJECTION INTRAVENOUS at 10:36

## 2023-07-12 RX ADMIN — IOPAMIDOL 85 ML: 755 INJECTION, SOLUTION INTRAVENOUS at 12:32

## 2023-07-12 RX ADMIN — METRONIDAZOLE 500 MG: 5 INJECTION, SOLUTION INTRAVENOUS at 14:06

## 2023-07-12 RX ADMIN — ONDANSETRON 4 MG: 2 INJECTION INTRAMUSCULAR; INTRAVENOUS at 10:36

## 2023-07-12 RX ADMIN — SODIUM CHLORIDE 1000 ML: 9 INJECTION, SOLUTION INTRAVENOUS at 10:36

## 2023-07-12 NOTE — ED NOTES
[facility] paged at 1912 for transport to [facility]- P386. [facility] returned page at 1915. ETA for transport 2300.

## 2023-07-12 NOTE — ED NOTES
Call placed to  regarding bed placement. Spoke with Maura who states patient will go to [address] room 386.

## 2023-07-12 NOTE — FSED PROVIDER NOTE
Subjective   History of Present Illness  The patient is a 78-year-old female.  She reports a history of constipation.  She presents today with left lower quadrant abdominal pain for approximately 12 to 18 hours.  She denies any significant nausea or vomiting.  She has developed significant foul smelling diarrhea here in ED.  She does admit to some left-sided back pain last night.  No true dysuria.  She does have a history of what she describes as a failed urological procedure.  It sounds like she has a pubovaginal sling.  No documented fever or chills.  No history of diverticulitis.  No trauma of note, she did have a bowel movement during the interview and states that she currently feels improved    Of note she does have a pessary in place which was readjusted by her gynecologist yesterday.  She does admit to some left-sided back pain last night.  No true dysuria.  She does have a history of what she describes as a failed urological procedure.  It sounds like she has a pubovaginal sling.  No documented fever or chills.  No history of diverticulitis.  No trauma of note, she did have a bowel movement during the interview and states that she currently feels improved    Review of Systems  Constitutional: No fevers, chills, sweats unless otherwise documented in HPI  Eyes: No recent visual problems, eye discharge, eye pain, redness unless otherwise documented in HPI  HEENT: No ear pain, nasal congestion, sore throat, voice changes unless otherwise documented in HPI  Respiratory: No shortness of breath, cough, pain on breathing, sputum production unless otherwise documented in HPI  Cardiovascular: No chest pain, palpitations, syncope, orthopnea unless otherwise documented in HPI  Gastrointestinal: No nausea, vomiting, diarrhea, constipation unless otherwise documented in HPI  Genitourinary: No hematuria, dysuria, incontinence unless otherwise documented in HPI  Endocrine: Negative for excessive thirst, excessive hunger,  excessive urination, heat or cold intolerance unless otherwise documented in HPI  Musculoskeletal: No back pain, neck pain, joint pain, muscle pain, decreased range of motion unless otherwise documented in HPI  Integumentary: No rash, pruritus, abrasion, lesions unless otherwise documented in HPI  Neurologic: No weakness, numbness, frequent headaches, tremors unless otherwise documented in HPI  Psychiatric: No anxiety, depression, mood changes, hallucinations unless otherwise documented in HPI        Past Medical History:   Diagnosis Date    Breast cancer 2010    Rt    Drug therapy     femara 5-7 years.    Eczema     History of shingles     Hyperlipidemia     Hypertension     Knee pain     Prolapse of female bladder, acquired     Seasonal allergies     Slow to wake up after anesthesia     SOB (shortness of breath) on exertion     Urinary incontinence        Allergies   Allergen Reactions    Penicillins Unknown (See Comments)     UNSURE-AS A CHILD       Past Surgical History:   Procedure Laterality Date    ANTERIOR AND POSTERIOR VAGINAL REPAIR N/A 01/07/2020    Procedure: LAPAROSCOPIC/ABDOMINAL ENTEROCELE REPAIR;;  Surgeon: Senia Frederick MD;  Location: [facility];  Service: Gynecology    BREAST BIOPSY Right 2010    malignant in 3 quadrents.    BREAST SURGERY  08/01/2010    mastectomy    KNEE SURGERY      MASTECTOMY Right 2010    PUBOVAGINAL SLING N/A 01/07/2020    Procedure: PUBOVAGINAL SLING; POSTERIOR COLPORRHAPHY; ANTERIOR COLPORRHAPHY; ENTRAPERITONEAL COLPOPEXY SACROSPINUS LIGAMENT FIXATION; INSERTION OF VAGINAL GRAFT; CYSTOURETHROSCOPY;  Surgeon: Senia Frederick MD;  Location: [facility];  Service: Gynecology    REDUCTION MAMMAPLASTY Left     TOTAL LAPAROSCOPIC HYSTERECTOMY, SACROCOLPOPEXY N/A 01/07/2020    Procedure: LAPAROSCOPIC UTEROSCRAL LIGAMENT SUSPENSION/SACRAL COLPOPEXY; LAPAROSCOPIC PARAVAGINAL REPAIR; TOTAL LAPAROSCPIC HYSTERECTOMY WITH BILATERAL SALPINGECTOMY;;  Surgeon: Senia Frederick,  MD;  Location: [facility] MAIN OR;  Service: Gynecology    TUBAL ABDOMINAL LIGATION         Family History   Problem Relation Age of Onset    COPD Mother     Heart failure Mother     Hypertension Mother     Heart disease Mother     Depression Mother     Vision loss Mother     Heart failure Father     Hypertension Father     Heart disease Father     Vision loss Maternal Grandfather     Malig Hyperthermia Neg Hx     Breast cancer Neg Hx     Ovarian cancer Neg Hx        Social History     Socioeconomic History    Marital status:    Tobacco Use    Smoking status: Never    Smokeless tobacco: Never   Substance and Sexual Activity    Alcohol use: Yes     Comment: 1/day    Drug use: No    Sexual activity: Never           Objective   Physical Exam  Vital signs: Reviewed in nurses notes    General: Patient is awake alert.  She does appear to be uncomfortable    HEENT: Normocephalic atraumatic nasopharynx clear pharynx clear and moist.    Neck:   Supple without lymphadenopathy    Respiratory:   Nonlabored respirations.  Clear to auscultation bilaterally.  Equal breath sounds bilaterally.  No wheezes or stridor noted.    Cardiovascular: Regular rate and rhythm.  No pretibial or pedal edema    Abdomen: Soft, mildly distended.  Bowel sounds are normal.  There is very minimal left lower quadrant tenderness to deep palpation.  No guarding or rebound.  I do not palpate any masses    Skin:   Warm and dry.  No rashes noted    Neurological examination: Patient is awake alert oriented x4.  Speech is normal.  No facial palsy.  No focal motor or sensory deficits.    Procedures           ED Course  ED Course as of 07/12/23 1626   Wed Jul 12, 2023   1334 D/W Dr. Villa who has kindly accepted for admission to [facility].  Pt is stable. [TC]   1629 I did receive a call from lab at this time which did confirm patient is C. difficile toxin positive.  We have already given patient IV Flagyl.  Patient has been maintained in appropriate  isolation. [TC]      ED Course User Index  [TC] Chance Amin MD           ER Course:   IV was established.  Patient was made NPO.  Normal saline was initiated with 1L over 2 hours.  A second liter of isotonic fluid was initiated in the form of lactated Ringer's at 100 mils per hour thereafter.  She was given IV Pepcid 20 mg as well as IV Zofran.    CT scan of the abdomen and pelvis was obtained which did reveal pancolitis.  It likewise did reveal distended bladder.  The patient's pessary was adjusted yesterday and it does appear that she does have some degree of bladder outlet obstruction.  A brandt catheter was placed and greater than 600 ml of urine has been obtained thus far.     Blood cultures have been drawn.  Lactic acid is currently pending.  Will initiate IV Flagyl 500 mg IV as we do not carry oral vancomycin in the emergency department at this facility.    Patient has had several loose bowel movements when she first arrived as well as on the CT scan table.  We have been able to collect an appropriate sample at this time.   This was sent for C diff and GI PCR panel.                                   Differential diagnosis: Colitis, diverticulitis, C. difficile colitis, UTI, pyelonephritis   Medical Decision Making  Problems Addressed:  Colitis: complicated acute illness or injury  Diarrhea of presumed infectious origin: complicated acute illness or injury    Amount and/or Complexity of Data Reviewed  Labs: ordered.  Radiology: ordered.    Risk  Prescription drug management.  Decision regarding hospitalization.    The CAT scan was independently reviewed in addition to the review of the radiologist interpretation      Final diagnoses:   Colitis   Diarrhea of presumed infectious origin   C. difficile colitis       ED Disposition  ED Disposition       ED Disposition   Decision to Admit    Condition   --    Comment   Level of Care: Med/Surg [1]   Diagnosis: Colitis [368662]   Attending Physician: LU  SOLANGE COPELAND [[id number]]   Isolate for COVID?: No [0]   Bed Request Comments: C diff precautions until ruled out.   Certification: I Certify That Inpatient Hospital Services Are Medically Necessary For Greater Than 2 Midnights                 No follow-up provider specified.       Medication List      No changes were made to your prescriptions during this visit.

## 2023-07-12 NOTE — ED NOTES
Patient had 1 episode of bowel and bladder incontinence upon arrival. Patient thoroughly cleansed & linens changed. Patient now resting more comfortably and reports some improvement in abdominal pain. Dr. Amin notified.

## 2023-07-13 PROBLEM — A04.72 C. DIFFICILE COLITIS: Status: ACTIVE | Noted: 2023-07-12

## 2023-07-13 PROBLEM — A41.9 SEPSIS WITHOUT ACUTE ORGAN DYSFUNCTION: Status: ACTIVE | Noted: 2023-07-13

## 2023-07-13 PROBLEM — R33.8 ACUTE URINARY RETENTION: Status: ACTIVE | Noted: 2023-07-13

## 2023-07-13 PROBLEM — I12.9 BENIGN HYPERTENSIVE KIDNEY DISEASE: Status: RESOLVED | Noted: 2022-08-31 | Resolved: 2023-07-13

## 2023-07-13 LAB
ANION GAP SERPL CALCULATED.3IONS-SCNC: 7.9 MMOL/L (ref 5–15)
BUN SERPL-MCNC: 14 MG/DL (ref 8–23)
BUN/CREAT SERPL: 13.3 (ref 7–25)
CALCIUM SPEC-SCNC: 8.8 MG/DL (ref 8.6–10.5)
CHLORIDE SERPL-SCNC: 106 MMOL/L (ref 98–107)
CO2 SERPL-SCNC: 23.1 MMOL/L (ref 22–29)
CREAT SERPL-MCNC: 1.05 MG/DL (ref 0.57–1)
D-LACTATE SERPL-SCNC: 1.3 MMOL/L (ref 0.5–2)
D-LACTATE SERPL-SCNC: 2.4 MMOL/L (ref 0.5–2)
DEPRECATED RDW RBC AUTO: 44.3 FL (ref 37–54)
EGFRCR SERPLBLD CKD-EPI 2021: 54.8 ML/MIN/1.73
ERYTHROCYTE [DISTWIDTH] IN BLOOD BY AUTOMATED COUNT: 13.2 % (ref 12.3–15.4)
GLUCOSE SERPL-MCNC: 119 MG/DL (ref 65–99)
HCT VFR BLD AUTO: 36.5 % (ref 34–46.6)
HCT VFR BLD AUTO: 36.8 % (ref 34–46.6)
HGB BLD-MCNC: 12.6 G/DL (ref 12–15.9)
HGB BLD-MCNC: 12.7 G/DL (ref 12–15.9)
MCH RBC QN AUTO: 31.7 PG (ref 26.6–33)
MCHC RBC AUTO-ENTMCNC: 34.5 G/DL (ref 31.5–35.7)
MCV RBC AUTO: 91.7 FL (ref 79–97)
PLATELET # BLD AUTO: 236 10*3/MM3 (ref 140–450)
PMV BLD AUTO: 11 FL (ref 6–12)
POTASSIUM SERPL-SCNC: 3.6 MMOL/L (ref 3.5–5.2)
POTASSIUM SERPL-SCNC: 4 MMOL/L (ref 3.5–5.2)
RBC # BLD AUTO: 3.98 10*6/MM3 (ref 3.77–5.28)
SODIUM SERPL-SCNC: 137 MMOL/L (ref 136–145)
WBC NRBC COR # BLD: 21.67 10*3/MM3 (ref 3.4–10.8)

## 2023-07-13 PROCEDURE — 85027 COMPLETE CBC AUTOMATED: CPT | Performed by: NURSE PRACTITIONER

## 2023-07-13 PROCEDURE — 0 METRONIDAZOLE 500 MG/100ML SOLUTION: Performed by: NURSE PRACTITIONER

## 2023-07-13 PROCEDURE — 0 METRONIDAZOLE 500 MG/100ML SOLUTION: Performed by: EMERGENCY MEDICINE

## 2023-07-13 PROCEDURE — 80048 BASIC METABOLIC PNL TOTAL CA: CPT | Performed by: NURSE PRACTITIONER

## 2023-07-13 PROCEDURE — 85018 HEMOGLOBIN: CPT | Performed by: INTERNAL MEDICINE

## 2023-07-13 PROCEDURE — 84132 ASSAY OF SERUM POTASSIUM: CPT | Performed by: INTERNAL MEDICINE

## 2023-07-13 PROCEDURE — 85014 HEMATOCRIT: CPT | Performed by: INTERNAL MEDICINE

## 2023-07-13 PROCEDURE — 83605 ASSAY OF LACTIC ACID: CPT | Performed by: EMERGENCY MEDICINE

## 2023-07-13 RX ORDER — ACETAMINOPHEN 325 MG/1
650 TABLET ORAL EVERY 4 HOURS PRN
Status: DISCONTINUED | OUTPATIENT
Start: 2023-07-13 | End: 2023-07-15 | Stop reason: HOSPADM

## 2023-07-13 RX ORDER — ASPIRIN 81 MG/1
81 TABLET ORAL DAILY
Status: DISCONTINUED | OUTPATIENT
Start: 2023-07-13 | End: 2023-07-15 | Stop reason: HOSPADM

## 2023-07-13 RX ORDER — ACETAMINOPHEN 650 MG/1
650 SUPPOSITORY RECTAL EVERY 4 HOURS PRN
Status: DISCONTINUED | OUTPATIENT
Start: 2023-07-13 | End: 2023-07-15 | Stop reason: HOSPADM

## 2023-07-13 RX ORDER — ROSUVASTATIN CALCIUM 5 MG/1
5 TABLET, COATED ORAL DAILY
Status: DISCONTINUED | OUTPATIENT
Start: 2023-07-13 | End: 2023-07-15 | Stop reason: HOSPADM

## 2023-07-13 RX ORDER — AMLODIPINE BESYLATE 2.5 MG/1
2.5 TABLET ORAL DAILY
Status: DISCONTINUED | OUTPATIENT
Start: 2023-07-13 | End: 2023-07-15 | Stop reason: HOSPADM

## 2023-07-13 RX ORDER — AMOXICILLIN 250 MG
2 CAPSULE ORAL 2 TIMES DAILY
Status: DISCONTINUED | OUTPATIENT
Start: 2023-07-13 | End: 2023-07-13

## 2023-07-13 RX ORDER — BISACODYL 10 MG
10 SUPPOSITORY, RECTAL RECTAL DAILY PRN
Status: DISCONTINUED | OUTPATIENT
Start: 2023-07-13 | End: 2023-07-13

## 2023-07-13 RX ORDER — ONDANSETRON 2 MG/ML
4 INJECTION INTRAMUSCULAR; INTRAVENOUS EVERY 6 HOURS PRN
Status: DISCONTINUED | OUTPATIENT
Start: 2023-07-13 | End: 2023-07-15 | Stop reason: HOSPADM

## 2023-07-13 RX ORDER — DIPHENOXYLATE HYDROCHLORIDE AND ATROPINE SULFATE 2.5; .025 MG/1; MG/1
1 TABLET ORAL DAILY
Status: DISCONTINUED | OUTPATIENT
Start: 2023-07-13 | End: 2023-07-15 | Stop reason: HOSPADM

## 2023-07-13 RX ORDER — ALBUTEROL SULFATE 2.5 MG/3ML
2.5 SOLUTION RESPIRATORY (INHALATION) EVERY 6 HOURS PRN
Status: DISCONTINUED | OUTPATIENT
Start: 2023-07-13 | End: 2023-07-15 | Stop reason: HOSPADM

## 2023-07-13 RX ORDER — UREA 10 %
3 LOTION (ML) TOPICAL NIGHTLY PRN
Status: DISCONTINUED | OUTPATIENT
Start: 2023-07-13 | End: 2023-07-15 | Stop reason: HOSPADM

## 2023-07-13 RX ORDER — SODIUM CHLORIDE, SODIUM LACTATE, POTASSIUM CHLORIDE, CALCIUM CHLORIDE 600; 310; 30; 20 MG/100ML; MG/100ML; MG/100ML; MG/100ML
125 INJECTION, SOLUTION INTRAVENOUS CONTINUOUS
Status: DISCONTINUED | OUTPATIENT
Start: 2023-07-13 | End: 2023-07-13

## 2023-07-13 RX ORDER — VANCOMYCIN HYDROCHLORIDE 125 MG/1
125 CAPSULE ORAL EVERY 6 HOURS SCHEDULED
Status: DISCONTINUED | OUTPATIENT
Start: 2023-07-13 | End: 2023-07-15 | Stop reason: HOSPADM

## 2023-07-13 RX ORDER — POTASSIUM CHLORIDE 750 MG/1
40 TABLET, FILM COATED, EXTENDED RELEASE ORAL EVERY 4 HOURS
Status: COMPLETED | OUTPATIENT
Start: 2023-07-13 | End: 2023-07-13

## 2023-07-13 RX ORDER — METRONIDAZOLE 500 MG/100ML
500 INJECTION, SOLUTION INTRAVENOUS EVERY 8 HOURS SCHEDULED
Status: DISCONTINUED | OUTPATIENT
Start: 2023-07-13 | End: 2023-07-13

## 2023-07-13 RX ORDER — ACETAMINOPHEN 160 MG/5ML
650 SOLUTION ORAL EVERY 4 HOURS PRN
Status: DISCONTINUED | OUTPATIENT
Start: 2023-07-13 | End: 2023-07-15 | Stop reason: HOSPADM

## 2023-07-13 RX ORDER — SODIUM CHLORIDE 9 MG/ML
100 INJECTION, SOLUTION INTRAVENOUS CONTINUOUS
Status: DISCONTINUED | OUTPATIENT
Start: 2023-07-13 | End: 2023-07-15 | Stop reason: HOSPADM

## 2023-07-13 RX ORDER — ONDANSETRON 4 MG/1
4 TABLET, FILM COATED ORAL EVERY 6 HOURS PRN
Status: DISCONTINUED | OUTPATIENT
Start: 2023-07-13 | End: 2023-07-15 | Stop reason: HOSPADM

## 2023-07-13 RX ORDER — BISACODYL 5 MG/1
5 TABLET, DELAYED RELEASE ORAL DAILY PRN
Status: DISCONTINUED | OUTPATIENT
Start: 2023-07-13 | End: 2023-07-13

## 2023-07-13 RX ORDER — POLYETHYLENE GLYCOL 3350 17 G/17G
17 POWDER, FOR SOLUTION ORAL DAILY PRN
Status: DISCONTINUED | OUTPATIENT
Start: 2023-07-13 | End: 2023-07-13

## 2023-07-13 RX ADMIN — SODIUM CHLORIDE, POTASSIUM CHLORIDE, SODIUM LACTATE AND CALCIUM CHLORIDE 125 ML/HR: 600; 310; 30; 20 INJECTION, SOLUTION INTRAVENOUS at 03:04

## 2023-07-13 RX ADMIN — ACETAMINOPHEN 650 MG: 325 TABLET, FILM COATED ORAL at 07:31

## 2023-07-13 RX ADMIN — VANCOMYCIN HYDROCHLORIDE 125 MG: 125 CAPSULE ORAL at 23:44

## 2023-07-13 RX ADMIN — POTASSIUM CHLORIDE 40 MEQ: 750 TABLET, EXTENDED RELEASE ORAL at 17:04

## 2023-07-13 RX ADMIN — ASPIRIN 81 MG: 81 TABLET, COATED ORAL at 09:23

## 2023-07-13 RX ADMIN — METRONIDAZOLE 500 MG: 500 INJECTION, SOLUTION INTRAVENOUS at 00:34

## 2023-07-13 RX ADMIN — SODIUM CHLORIDE 125 ML/HR: 9 INJECTION, SOLUTION INTRAVENOUS at 19:28

## 2023-07-13 RX ADMIN — AMLODIPINE BESYLATE 2.5 MG: 2.5 TABLET ORAL at 09:24

## 2023-07-13 RX ADMIN — POTASSIUM CHLORIDE 40 MEQ: 750 TABLET, EXTENDED RELEASE ORAL at 12:32

## 2023-07-13 RX ADMIN — Medication 1 TABLET: at 09:23

## 2023-07-13 RX ADMIN — SODIUM CHLORIDE, POTASSIUM CHLORIDE, SODIUM LACTATE AND CALCIUM CHLORIDE 1000 ML: 600; 310; 30; 20 INJECTION, SOLUTION INTRAVENOUS at 03:04

## 2023-07-13 RX ADMIN — ROSUVASTATIN CALCIUM 5 MG: 5 TABLET, FILM COATED ORAL at 09:24

## 2023-07-13 RX ADMIN — VANCOMYCIN HYDROCHLORIDE 125 MG: 125 CAPSULE ORAL at 17:04

## 2023-07-13 RX ADMIN — METRONIDAZOLE 500 MG: 500 INJECTION, SOLUTION INTRAVENOUS at 07:31

## 2023-07-13 RX ADMIN — SODIUM CHLORIDE 125 ML/HR: 9 INJECTION, SOLUTION INTRAVENOUS at 09:28

## 2023-07-13 RX ADMIN — VANCOMYCIN HYDROCHLORIDE 125 MG: 125 CAPSULE ORAL at 12:32

## 2023-07-13 NOTE — PLAN OF CARE
Goal Outcome Evaluation:           Progress: no change  Outcome Evaluation: pt came by ambulance from [facility] ER for colitis and c-diff, A&O x4, ad nayana but we have bed alarm on since she frequently using restroom,  IVF, gave 1x dose flagyl, sepsis positive, bed side commode

## 2023-07-13 NOTE — H&P
"    Patient Name:  Marcia Ulloa  YOB: 1945  MRN:  [id number]  Admit Date:  7/12/2023  Patient Care Team:  Bk Mendez MD as PCP - General (Family Medicine)  Senia Frederick MD as Consulting Physician (Obstetrics and Gynecology)  Thaddeus Stafford MD as Consulting Physician (Orthopedic Surgery)  Elayne Paulino MD (Inactive) as Consulting Physician (Gynecology)  Hunter Benito DC (Chiropractic Medicine)  Shahrzad Maza MD as Consulting Physician (Ophthalmology)  Kostas Loomis MD (Dermatology)      Subjective   History Present Illness     Chief Complaint   Patient presents with    Abdominal Pain     History of Present Illness  Ms. Ulloa is a 78 year old female with a history of breast cancer, HTN, HLD, and prolapse bladder, with  adjustment made on Tuesday 07/11/23 to her pessary device.  Who [facility] with complaints of left lower abdominal pain and left lower back pain Wednesday night.  While at [facility] emergency department she states she started having increased abdominal pain and started having excessive diarrhea. She states she felt the diarrhea was related to she and her  ate Mexican food Wednesday night, she also reported urinary retention on Tuesday after her adjustment that has now resolved. According to review of medical records she was treated with Macrobid in May that unfortunately caused an LANI, with given IV ceftriaxone during her hospitalization for syncope.   Patient is currently resting in bed, she is alert and oriented but is confused about details  of her medical history. Her family reported to staff she has [place] Dementia and can be forgetful at times. She denies any acute distress her only complaint is \"my stomach is not happy\".  She denies chest pain, shortness of breath, fever, chills, cough, headache, lightheadedness, syncope, nausea, vomiting, constipation or  symptoms. Upon exam her lungs are " CTA, She is tachycardic, her rhythm is  normal,she has palpable pulses bilaterally upper and lower extremities, her abdomen is soft, nondistended, with hyperactive bowel sounds heard throughout, she has no obvious edema.  Initial evaluation  in the emergency department: Blood pressure 132/75, heart rate 104, she is febrile with a Tmax of 100.3, oxygen saturation 96% on 1.5 L     Initial lactate was 2.7, trended to 2.1, and 3.1, elevated lipase 68, her creatinine is 1.19, which proved for most recent CMP 1.21, elevated white count of 14.02, with 81.6% neutrophils.     Upon arrival to nursing unit, patient is triggering positive for sepsis, fluid bolus, and resuscitation initiated, with appropriate antibiotics ordered.     Review of Systems   Constitutional: Negative.    HENT: Negative.     Eyes: Negative.    Respiratory: Negative.     Gastrointestinal:  Positive for abdominal pain and diarrhea.   Endocrine: Negative.    Genitourinary:  Positive for difficulty urinating.        Briscoe cath    Musculoskeletal:  Positive for back pain.   Allergic/Immunologic: Negative.    Neurological: Negative.    Hematological: Negative.    Psychiatric/Behavioral: Negative.     All other systems reviewed and are negative.    Personal History     Past Medical History:   Diagnosis Date    Breast cancer 2010    Rt    Drug therapy     femara 5-7 years.    Eczema     History of shingles     Hyperlipidemia     Hypertension     Knee pain     Prolapse of female bladder, acquired     Seasonal allergies     Slow to wake up after anesthesia     SOB (shortness of breath) on exertion     Urinary incontinence      Past Surgical History:   Procedure Laterality Date    ANTERIOR AND POSTERIOR VAGINAL REPAIR N/A 01/07/2020    Procedure: LAPAROSCOPIC/ABDOMINAL ENTEROCELE REPAIR;;  Surgeon: Senia Frederick MD;  Location: [facility];  Service: Gynecology    BREAST BIOPSY Right 2010    malignant in 3 quadrents.    BREAST SURGERY  08/01/2010     mastectomy    KNEE SURGERY      MASTECTOMY Right 2010    PUBOVAGINAL SLING N/A 01/07/2020    Procedure: PUBOVAGINAL SLING; POSTERIOR COLPORRHAPHY; ANTERIOR COLPORRHAPHY; ENTRAPERITONEAL COLPOPEXY SACROSPINUS LIGAMENT FIXATION; INSERTION OF VAGINAL GRAFT; CYSTOURETHROSCOPY;  Surgeon: Senia Frederick MD;  Location: [facility] OR;  Service: Gynecology    REDUCTION MAMMAPLASTY Left     TOTAL LAPAROSCOPIC HYSTERECTOMY, SACROCOLPOPEXY N/A 01/07/2020    Procedure: LAPAROSCOPIC UTEROSCRAL LIGAMENT SUSPENSION/SACRAL COLPOPEXY; LAPAROSCOPIC PARAVAGINAL REPAIR; TOTAL LAPAROSCPIC HYSTERECTOMY WITH BILATERAL SALPINGECTOMY;;  Surgeon: Senia Frederick MD;  Location: [facility] MAIN OR;  Service: Gynecology    TUBAL ABDOMINAL LIGATION       Family History   Problem Relation Age of Onset    COPD Mother     Heart failure Mother     Hypertension Mother     Heart disease Mother     Depression Mother     Vision loss Mother     Heart failure Father     Hypertension Father     Heart disease Father     Vision loss Maternal Grandfather     Malig Hyperthermia Neg Hx     Breast cancer Neg Hx     Ovarian cancer Neg Hx      Social History     Tobacco Use    Smoking status: Never    Smokeless tobacco: Never   Substance Use Topics    Alcohol use: Yes     Comment: 1/day    Drug use: No     No current facility-administered medications on file prior to encounter.     Current Outpatient Medications on File Prior to Encounter   Medication Sig Dispense Refill    amLODIPine (NORVASC) 5 MG tablet Take 0.5 tablets by mouth Daily. 90 tablet 2    aspirin 81 MG EC tablet Take 1 tablet by mouth Daily. HELD      Calcium Carbonate-Vitamin D (CALCIUM-CARB 600 + D PO) Take 1 tablet by mouth Daily.      CRANBERRY PO Take 1 tablet by mouth Daily.      melatonin 3 MG tablet Take 1 tablet by mouth At Night As Needed for Sleep.      Multiple Vitamin (MULTI VITAMIN DAILY PO) Take 1 tablet by mouth Daily.      rosuvastatin (CRESTOR) 5 MG tablet Take 1 tablet by mouth  Daily. 90 tablet 3    TURMERIC PO Take 1 tablet by mouth Daily. HELD      albuterol sulfate  (90 Base) MCG/ACT inhaler Inhale 2 puffs Every 4 (Four) Hours As Needed for Wheezing or Shortness of Air. 18 g 3     Allergies   Allergen Reactions    Penicillins Unknown (See Comments)     UNSURE-AS A CHILD       Objective    Objective     Vital Signs  Temp:  [97.3 °F (36.3 °C)-100.3 °F (37.9 °C)] 100.3 °F (37.9 °C)  Heart Rate:  [] 100  Resp:  [15-18] 18  BP: (132-163)/(69-89) 153/78  SpO2:  [91 %-99 %] 96 %  on  Flow (L/min):  [1-1.5] 1.5;   Device (Oxygen Therapy): nasal cannula  Body mass index is 24.55 kg/m².    Physical Exam  Vitals and nursing note reviewed.   Constitutional:       General: She is sleeping.      Appearance: Normal appearance.      Interventions: Nasal cannula in place.      Comments: 1.5 L    HENT:      Mouth/Throat:      Mouth: Mucous membranes are dry.   Eyes:      Pupils: Pupils are equal, round, and reactive to light.   Cardiovascular:      Rate and Rhythm: Regular rhythm. Tachycardia present.   Pulmonary:      Effort: Pulmonary effort is normal.      Breath sounds: Normal breath sounds.   Abdominal:      General: Bowel sounds are increased.      Palpations: Abdomen is soft.   Musculoskeletal:         General: Normal range of motion.   Skin:     General: Skin is warm and dry.      Capillary Refill: Capillary refill takes less than 2 seconds.   Neurological:      General: No focal deficit present.      Mental Status: She is easily aroused. She is confused.   Psychiatric:         Behavior: Behavior is cooperative.       Results Review:  I reviewed the patient's new clinical results.  I reviewed the patient's new imaging results and agree with the interpretation.  I reviewed the patient's other test results and agree with the interpretation  I personally viewed and interpreted the patient's EKG/Telemetry data  Discussed with ED provider.    Lab Results (last 24 hours)       Procedure  Component Value Units Date/Time    CBC & Differential [[id number]]  (Abnormal) Collected: 07/12/23 0950    Specimen: Blood Updated: 07/12/23 1000    Narrative:      The following orders were created for panel order CBC & Differential.  Procedure                               Abnormality         Status                     ---------                               -----------         ------                     CBC Auto Differential[[id number]]        Abnormal            Final result                 Please view results for these tests on the individual orders.    Comprehensive Metabolic Panel [[id number]]  (Abnormal) Collected: 07/12/23 0950    Specimen: Blood Updated: 07/12/23 1150     Glucose 164 mg/dL      BUN 17 mg/dL      Creatinine 1.19 mg/dL      Sodium 138 mmol/L      Potassium 3.7 mmol/L      Comment: Slight hemolysis detected by analyzer. Results may be affected.        Chloride 107 mmol/L      CO2 21.5 mmol/L      Calcium 10.1 mg/dL      Total Protein 6.6 g/dL      Albumin 3.9 g/dL      ALT (SGPT) 11 U/L      AST (SGOT) 16 U/L      Alkaline Phosphatase 94 U/L      Total Bilirubin 0.6 mg/dL      Globulin 2.7 gm/dL      A/G Ratio 1.4 g/dL      BUN/Creatinine Ratio 14.3     Anion Gap 9.5 mmol/L      eGFR 47.2 mL/min/1.73     Narrative:      GFR Normal >60  Chronic Kidney Disease <60  Kidney Failure <15    The GFR formula is only valid for adults with stable renal function between ages 18 and 70.    Lipase [[id number]]  (Abnormal) Collected: 07/12/23 0950    Specimen: Blood Updated: 07/12/23 1150     Lipase 68 U/L     CBC Auto Differential [[id number]]  (Abnormal) Collected: 07/12/23 0950    Specimen: Blood Updated: 07/12/23 1000     WBC 14.02 10*3/mm3      RBC 4.37 10*6/mm3      Hemoglobin 13.3 g/dL      Hematocrit 42.0 %      MCV 96.1 fL      MCH 30.4 pg      MCHC 31.7 g/dL      RDW 14.0 %      RDW-SD 50.2 fl      MPV 10.8 fL      Platelets 253 10*3/mm3      Neutrophil % 81.6 %      Lymphocyte % 10.6 %      Monocyte  % 6.1 %      Eosinophil % 1.1 %      Basophil % 0.4 %      Immature Grans % 0.2 %      Neutrophils, Absolute 11.43 10*3/mm3      Lymphocytes, Absolute 1.49 10*3/mm3      Monocytes, Absolute 0.86 10*3/mm3      Eosinophils, Absolute 0.16 10*3/mm3      Basophils, Absolute 0.05 10*3/mm3      Immature Grans, Absolute 0.03 10*3/mm3     Clostridioides difficile Toxin - Stool, Per Rectum [[id number]]  (Abnormal) Collected: 07/12/23 1051    Specimen: Stool from Per Rectum Updated: 07/12/23 1624    Narrative:      The following orders were created for panel order Clostridioides difficile Toxin - Stool, Per Rectum.  Procedure                               Abnormality         Status                     ---------                               -----------         ------                     Clostridioides difficile...[[id number]]  Abnormal            Final result                 Please view results for these tests on the individual orders.    Gastrointestinal Panel, PCR - Stool, Per Rectum [[id number]]  (Normal) Collected: 07/12/23 1051    Specimen: Stool from Per Rectum Updated: 07/12/23 1617     Campylobacter Not Detected     Plesiomonas shigelloides Not Detected     Salmonella Not Detected     Vibrio Not Detected     Vibrio cholerae Not Detected     Yersinia enterocolitica Not Detected     Enteroaggregative E. coli (EAEC) Not Detected     Enteropathogenic E. coli (EPEC) Not Detected     Enterotoxigenic E. coli (ETEC) lt/st Not Detected     Shiga-like toxin-producing E. coli (STEC) stx1/stx2 Not Detected     Shigella/Enteroinvasive E. coli (EIEC) Not Detected     Cryptosporidium Not Detected     Cyclospora cayetanensis Not Detected     Entamoeba histolytica Not Detected     Giardia lamblia Not Detected     Adenovirus F40/41 Not Detected     Astrovirus Not Detected     Norovirus GI/GII Not Detected     Rotavirus A Not Detected     Sapovirus (I, II, IV or V) Not Detected    Narrative:      If Aeromonas, Staphylococcus aureus or  Bacillus cereus are suspected, please order [id number]: Stool Culture, Aeromonas, S aureus, B Cereus.    Clostridioides difficile Toxin, PCR - Stool, Per Rectum [[id number]]  (Abnormal) Collected: 07/12/23 1051    Specimen: Stool from Per Rectum Updated: 07/12/23 1624     Toxigenic C. difficile by PCR Positive    Narrative:      DNA from a toxigenic strain of C.difficile has been detected. Antigen testing for the presence of free C.difficile toxin is currently in progress, to help determine the clinical significance of this PCR result.     Clostridioides difficile toxin Ag, Reflex - Stool, Per Rectum [[id number]]  (Normal) Collected: 07/12/23 1051    Specimen: Stool from Per Rectum Updated: 07/12/23 1728     C.diff Toxin Ag Negative    Narrative:      DNA from a toxigenic strain of C.difficile was detected, although the free toxin itself was not detected. These findings are consistent with C.difficile colonization and may not reflect actual C.difficile infection. Clinical correlation needed.    Urinalysis With Microscopic If Indicated (No Culture) - Urine, Clean Catch [[id number]]  (Normal) Collected: 07/12/23 1355    Specimen: Urine, Clean Catch Updated: 07/12/23 1418     Color, UA Yellow     Appearance, UA Clear     pH, UA 7.0     Specific Gravity, UA 1.015     Glucose, UA Negative     Ketones, UA Negative     Bilirubin, UA Negative     Blood, UA Negative     Protein, UA Negative     Leuk Esterase, UA Negative     Nitrite, UA Negative     Urobilinogen, UA 0.2 E.U./dL    Narrative:      Urine microscopic not indicated.    Lactic Acid, Plasma [[id number]]  (Abnormal) Collected: 07/12/23 1359    Specimen: Blood Updated: 07/12/23 1435     Lactate 2.7 mmol/L     Blood Culture - Blood, Arm, Right [[id number]] Collected: 07/12/23 1359    Specimen: Blood from Arm, Right Updated: 07/12/23 1412    Blood Culture - Blood, Wrist, Left [[id number]] Collected: 07/12/23 1406    Specimen: Blood from Wrist, Left Updated: 07/12/23 1411     STAT Lactic Acid, Reflex [[id number]]  (Abnormal) Collected: 07/12/23 1707    Specimen: Blood Updated: 07/12/23 1728     Lactate 2.1 mmol/L     STAT Lactic Acid, Reflex [[id number]]  (Abnormal) Collected: 07/12/23 2305    Specimen: Blood Updated: 07/12/23 2336     Lactate 3.1 mmol/L             Imaging Results (Last 24 Hours)       Procedure Component Value Units Date/Time    CT Abdomen Pelvis With Contrast [[id number]] Collected: 07/12/23 1243     Updated: 07/12/23 1250    Narrative:      EXAMINATION: CT OF THE ABDOMEN AND PELVIS WITH CONTRAST     TECHNIQUE: Computed tomography of the abdomen and pelvis after the  uneventful administration of nonionic intravenous contrast per protocol.  Radiation dose reduction techniques were utilized, including automated  exposure control and exposure modulation based on body size.      HISTORY: Left lower quadrant pain and constipation     COMPARISON: None available     FINDINGS: Limited evaluation of the inferior thorax demonstrates  atelectasis and a right-sided fat-containing Bochdalek type hernia with  atelectasis versus scarring at the bases. No consolidation, pleural  effusion, or pneumothorax are seen. The heart is normal in size and  configuration, without pericardial effusion. Coronary artery and aortic  valve calcifications are seen. There is pectus excavatum.     Old granulomatous disease is seen in the spleen. There is a is small to  moderate-sized sliding-type hiatal hernia. Cysts and lesions that are  technically indeterminate, likely cysts, are seen in the kidneys. There  is wall thickening and stranding of the majority of the colon. The  urinary bladder is distended with wall thickening. There is a vaginal  device.     The liver, adrenal glands, pancreas, small bowel, appendix,, and  abdominal vasculature are normal. No intraperitoneal free gas is seen.  No enlarged lymph nodes are demonstrated. There is a left-sided  fat-containing inguinal hernia. Trace free  pelvic fluid, possibly  physiologic.     Bone windows demonstrate degenerative changes, without suspicious  osseous lesion seen.       Impression:      1. Pancolitis, likely infectious or inflammatory etiology  2. FINDINGS suggestive of bladder outlet obstruction  3. Incidental findings as above.     This report was finalized on 7/12/2023 12:47 PM by Dr. Osvaldo Ramos M.D.               Results for orders placed during the hospital encounter of 05/18/23    Adult Transthoracic Echo Complete With Contrast if Necessary Per Protocol    Interpretation Summary    Left ventricular systolic function is normal. Left ventricular ejection fraction appears to be 61 - 65%.    Left ventricular diastolic function is consistent with (grade I) impaired relaxation.    The right ventricular cavity is borderline dilated. Normal right ventricular systolic function noted.    Mild aortic valve regurgitation is present.    Insufficient TR velocity profile to estimate the right ventricular systolic pressure.    There is no evidence of pericardial effusion      No orders to display        Assessment/Plan     Active Hospital Problems    Diagnosis  POA    Sepsis without acute organ dysfunction [A41.9]  Yes    C. difficile colitis [A04.72]  Yes    Stage 3 chronic kidney disease [N18.30]  Yes    Essential hypertension [I10]  Yes      Resolved Hospital Problems   No resolved problems to display.     C. difficile colitis  Continue Flagyl from outlying facility   Continue ondansetron  Contact Spore isolation      Sepsis without acute organ dysfunction   Fluid resuscitation initiated  Vital signs per policy  As needed acetaminophen for fever  Trend lactic   Monitor CBC    Urinary Retention  Briscoe cath care ordered at outlying facility  Briscoe catheter per policy     Essential hypertension  Chronic   Vital signs   Continue home Norvasc    Stage 3 chronic kidney disease  Chronic   Crt 1.19, BUN 17  Monitor I&O  Daily weights   Avoid nephrotoxic  medications hypovolemia, and hypotension     I discussed the patient's findings and my recommendations with patient.    VTE Prophylaxis - SCDs.  Code Status - Full code.       MAT Lazar  [facility]  07/13/23  01:40 EDT

## 2023-07-13 NOTE — CASE MANAGEMENT/SOCIAL WORK
Discharge Planning Assessment  [facility]     Patient Name: Marcia Ulloa  MRN: [id number]  Today's Date: 7/13/2023    Admit Date: 7/12/2023    Plan: Home with spouse   Discharge Needs Assessment       Row Name 07/13/23 0954       Living Environment    People in Home spouse    Current Living Arrangements home    Potentially Unsafe Housing Conditions none    Primary Care Provided by self    Provides Primary Care For no one    Family Caregiver if Needed spouse    Quality of Family Relationships involved;helpful;supportive    Able to Return to Prior Arrangements yes       Resource/Environmental Concerns    Resource/Environmental Concerns none       Transition Planning    Patient/Family Anticipates Transition to home with family    Patient/Family Anticipated Services at Transition none    Transportation Anticipated family or friend will provide       Discharge Needs Assessment    Readmission Within the Last 30 Days no previous admission in last 30 days    Equipment Currently Used at Home none    Concerns to be Addressed denies needs/concerns at this time;no discharge needs identified    Anticipated Changes Related to Illness none    Equipment Needed After Discharge none                   Discharge Plan       Row Name 07/13/23 0957       Plan    Plan Home with spouse    Plan Comments CCP met with patient and patient's spouse at bedside. CCP role explained and discharge planning discussed. Face sheet verified and IMM noted. Patient's PCP is Dr. Mendez. Patient lives with her , with 4 steps to the entrance. Patient has 13 steps to her bedroom (handrails present). Patient has access to a walker and BSC but does not typically use it. Patient is independent with her ADLs. Patient has not used home health or been to SNF. Patient does not anticipate any discharge needs and plans to return home. CCP will follow for any needs to arise. Mena HAYS                  Continued Care and Services - Admitted Since  7/12/2023    Coordination has not been started for this encounter.          Demographic Summary       Row Name 07/13/23 0953       General Information    Admission Type inpatient    Arrived From emergency department    Required Notices Provided Important Message from Medicare    Referral Source admission list    Reason for Consult discharge planning    Preferred Language English                   Functional Status       Row Name 07/13/23 0954       Functional Status    Usual Activity Tolerance good    Current Activity Tolerance good       Functional Status, IADL    Medications independent    Meal Preparation independent    Housekeeping independent    Laundry independent    Shopping independent       Mental Status    General Appearance WDL WDL       Mental Status Summary    Recent Changes in Mental Status/Cognitive Functioning no changes                   Psychosocial    No documentation.                  Abuse/Neglect    No documentation.                  Legal    No documentation.                  Substance Abuse    No documentation.                  Patient Forms    No documentation.                     SAÚL Forrest

## 2023-07-13 NOTE — PROGRESS NOTES
[facility] Clinical Pharmacy Services: C. Difficile Medication Changes       Pharmacy has been consulted to look over Marcia Ulloa's profile to check patient's medications for changes due to C. Difficile diagnosis per Dr. Casper's request.       Current C. Diff Regimen: vancomycin 125 mg PO Q6h x 10 days     Assessment/Plan/Changes       1. Proton pump inhibitor: None at thi this time.    2. Antiperistalic agents or stool softeners/laxatives: scheduled      pericolace and prn stool softeners have been discontinued at this time.       Thank you for allowing me to participate in your patient's care.  Please call pharmacy with any questions or concerns.     Cheyenne Gowers, [facility]  Clinical Pharmacist

## 2023-07-13 NOTE — PLAN OF CARE
Goal Outcome Evaluation:  Plan of Care Reviewed With: patient        Progress: improving     Pt AxOx4, calm and cooperative. VSS. IV fluids at 125 ml/hr. Takes meds whole with water, see MAR. PO Vancomycin and IV flagyl given. Blood present in watery stool, RN notified Dr. Cai. H&H rechecked at 1700. Pt is a stand by assist to the bedside commode. Significant other visited this afternoon. Pt tolerating diet. No complains of pain. RN will continue to monitor.

## 2023-07-14 LAB
ANION GAP SERPL CALCULATED.3IONS-SCNC: 9.5 MMOL/L (ref 5–15)
BUN SERPL-MCNC: 10 MG/DL (ref 8–23)
BUN/CREAT SERPL: 11.6 (ref 7–25)
CALCIUM SPEC-SCNC: 8.4 MG/DL (ref 8.6–10.5)
CHLORIDE SERPL-SCNC: 108 MMOL/L (ref 98–107)
CO2 SERPL-SCNC: 21.5 MMOL/L (ref 22–29)
CREAT SERPL-MCNC: 0.86 MG/DL (ref 0.57–1)
DEPRECATED RDW RBC AUTO: 44.7 FL (ref 37–54)
EGFRCR SERPLBLD CKD-EPI 2021: 69.7 ML/MIN/1.73
ERYTHROCYTE [DISTWIDTH] IN BLOOD BY AUTOMATED COUNT: 13.3 % (ref 12.3–15.4)
GLUCOSE SERPL-MCNC: 105 MG/DL (ref 65–99)
HCT VFR BLD AUTO: 37.2 % (ref 34–46.6)
HGB BLD-MCNC: 12.7 G/DL (ref 12–15.9)
MCH RBC QN AUTO: 31.6 PG (ref 26.6–33)
MCHC RBC AUTO-ENTMCNC: 34.1 G/DL (ref 31.5–35.7)
MCV RBC AUTO: 92.5 FL (ref 79–97)
PLATELET # BLD AUTO: 213 10*3/MM3 (ref 140–450)
PMV BLD AUTO: 11.1 FL (ref 6–12)
POTASSIUM SERPL-SCNC: 3.8 MMOL/L (ref 3.5–5.2)
RBC # BLD AUTO: 4.02 10*6/MM3 (ref 3.77–5.28)
SODIUM SERPL-SCNC: 139 MMOL/L (ref 136–145)
WBC NRBC COR # BLD: 18.7 10*3/MM3 (ref 3.4–10.8)

## 2023-07-14 PROCEDURE — 80048 BASIC METABOLIC PNL TOTAL CA: CPT | Performed by: INTERNAL MEDICINE

## 2023-07-14 PROCEDURE — 85027 COMPLETE CBC AUTOMATED: CPT | Performed by: INTERNAL MEDICINE

## 2023-07-14 PROCEDURE — 97530 THERAPEUTIC ACTIVITIES: CPT

## 2023-07-14 PROCEDURE — 97161 PT EVAL LOW COMPLEX 20 MIN: CPT

## 2023-07-14 RX ADMIN — Medication 1 TABLET: at 08:55

## 2023-07-14 RX ADMIN — VANCOMYCIN HYDROCHLORIDE 125 MG: 125 CAPSULE ORAL at 06:28

## 2023-07-14 RX ADMIN — VANCOMYCIN HYDROCHLORIDE 125 MG: 125 CAPSULE ORAL at 12:49

## 2023-07-14 RX ADMIN — AMLODIPINE BESYLATE 2.5 MG: 2.5 TABLET ORAL at 08:55

## 2023-07-14 RX ADMIN — ROSUVASTATIN CALCIUM 5 MG: 5 TABLET, FILM COATED ORAL at 08:55

## 2023-07-14 RX ADMIN — SODIUM CHLORIDE 125 ML/HR: 9 INJECTION, SOLUTION INTRAVENOUS at 01:00

## 2023-07-14 RX ADMIN — SODIUM CHLORIDE 100 ML/HR: 9 INJECTION, SOLUTION INTRAVENOUS at 20:29

## 2023-07-14 RX ADMIN — VANCOMYCIN HYDROCHLORIDE 125 MG: 125 CAPSULE ORAL at 18:28

## 2023-07-14 RX ADMIN — ASPIRIN 81 MG: 81 TABLET, COATED ORAL at 08:55

## 2023-07-14 NOTE — PROGRESS NOTES
" LOS: 2 days     Name: Marcia Ulloa  Age: 78 y.o.  Sex: female  :  1945  MRN: [id number]         Primary Care Physician: Bk Mendez MD    Subjective   Subjective  Patient reports improvement of her bowel movement frequency.  Had some bloody stools last night but these appear to have resolved.  There was some left lower quadrant abdominal pain/cramping.  Fevers have improved.    Objective   Vital Signs  Temp:  [98.6 °F (37 °C)-100 °F (37.8 °C)] 99.8 °F (37.7 °C)  Heart Rate:  [] 93  Resp:  [16] 16  BP: (130-146)/(70-85) 139/85  Body mass index is 25.23 kg/m².    Objective:  General Appearance:  Comfortable and in no acute distress (Nontoxic-appearing).    Vital signs: (most recent): Blood pressure 139/85, pulse 93, temperature 99.8 °F (37.7 °C), temperature source Oral, resp. rate 16, height 167.6 cm (66\"), weight 70.9 kg (156 lb 4.9 oz), SpO2 97 %, not currently breastfeeding.    Lungs:  Normal effort and normal respiratory rate.    Heart: Normal rate.  Regular rhythm.    Abdomen: Abdomen is soft.  Bowel sounds are normal.   There is left lower quadrant tenderness.    Extremities: There is no dependent edema or local swelling.    Neurological: Patient is alert and oriented to person, place and time.    Skin:  Warm and dry.              Results Review:       I reviewed the patient's new clinical results.    Results from last 7 days   Lab Units 23  0823  0950   WBC 10*3/mm3 18.70*  --  21.67* 14.02*   HEMOGLOBIN g/dL 12.7 12.7 12.6 13.3   PLATELETS 10*3/mm3 213  --  236 253     Results from last 7 days   Lab Units 23  0807 23  0950   SODIUM mmol/L 139  --  137 138   POTASSIUM mmol/L 3.8 4.0 3.6 3.7   CHLORIDE mmol/L 108*  --  106 107   CO2 mmol/L 21.5*  --  23.1 21.5*   BUN mg/dL 10  --  14 17   CREATININE mg/dL 0.86  --  1.05* 1.19*   CALCIUM mg/dL 8.4*  --  8.8 10.1   GLUCOSE mg/dL 105*  --  119* 164*       "           Scheduled Meds:   amLODIPine, 2.5 mg, Oral, Daily  aspirin, 81 mg, Oral, Daily  multivitamin, 1 tablet, Oral, Daily  rosuvastatin, 5 mg, Oral, Daily  vancomycin, 125 mg, Oral, Q6H      PRN Meds:     acetaminophen **OR** acetaminophen **OR** acetaminophen    albuterol    Calcium Replacement - Follow Nurse / BPA Driven Protocol    Magnesium Low Dose Replacement - Follow Nurse / BPA Driven Protocol    melatonin    ondansetron **OR** ondansetron    Pharmacy Consult    Phosphorus Replacement - Follow Nurse / BPA Driven Protocol    Potassium Replacement - Follow Nurse / BPA Driven Protocol    sodium chloride  Continuous Infusions:  Pharmacy Consult,   sodium chloride, 100 mL/hr, Last Rate: 125 mL/hr (07/14/23 0100)        Assessment & Plan   Active Hospital Problems    Diagnosis  POA    Sepsis without acute organ dysfunction [A41.9]  Yes    Acute urinary retention [R33.8]  Unknown    C. difficile colitis [A04.72]  Yes    Stage 3 chronic kidney disease [N18.30]  Yes    Essential hypertension [I10]  Yes      Resolved Hospital Problems   No resolved problems to display.       Assessment & Plan    -Continue oral vancomycin for treatment of C. difficile with pancolitis present on abdominal CT.  She reports improvement of her bowel movement frequency but stools remain loose.  Had some bloody bowel movement yesterday evening.  Hemoglobin appears stable.  Discussed case with Dr. Marquez.  Leukocytosis improving  -Supportive care with IV fluids.  Decrease rate today.  -Blood pressure stable on present regimen      Expected Discharge Date: 7/15/2023; Expected Discharge Time:      Sean Casper MD  [facility]  07/14/23  10:23 EDT

## 2023-07-14 NOTE — THERAPY EVALUATION
Patient Name: Marcia Ulloa  : 1945    MRN: [id number]                              Today's Date: 2023       Admit Date: 2023    Visit Dx:     ICD-10-CM ICD-9-CM   1. Colitis  K52.9 558.9   2. Diarrhea of presumed infectious origin  R19.7 009.3   3. C. difficile colitis  A04.72 008.45     Patient Active Problem List   Diagnosis    Essential hypertension    Other hyperlipidemia    Primary osteoarthritis of knee    Elevated serum creatinine    Acquired absence of breast and absent nipple    Asymptomatic postmenopausal status    Breast cancer, right breast    Osteoarthritis of hip    Personal history of breast cancer    Uterovaginal prolapse, incomplete    Cystocele, midline    Cystocele, lateral    Vaginal enterocele, congenital or acquired    Loss of perineal body, female    Female stress incontinence    Incomplete defecation    Incompetence or weakening of pubocervical tissue    Incompetence or weakening of rectovaginal tissue    Personal history of endometriosis    Recurrent syncope    Dyslipidemia    Stage 3 chronic kidney disease    Acute UTI (urinary tract infection)    C. difficile colitis    Essential hypertension    Sepsis without acute organ dysfunction    Acute urinary retention     Past Medical History:   Diagnosis Date    Breast cancer     Rt    Drug therapy     femara 5-7 years.    Eczema     History of shingles     Hyperlipidemia     Hypertension     Knee pain     Prolapse of female bladder, acquired     Seasonal allergies     Slow to wake up after anesthesia     SOB (shortness of breath) on exertion     Urinary incontinence      Past Surgical History:   Procedure Laterality Date    ANTERIOR AND POSTERIOR VAGINAL REPAIR N/A 2020    Procedure: LAPAROSCOPIC/ABDOMINAL ENTEROCELE REPAIR;;  Surgeon: Senia Frederick MD;  Location: [facility];  Service: Gynecology    BREAST BIOPSY Right 2010    malignant in 3 quadrents.    BREAST SURGERY  2010    mastectomy    KNEE  SURGERY      MASTECTOMY Right 2010    PUBOVAGINAL SLING N/A 01/07/2020    Procedure: PUBOVAGINAL SLING; POSTERIOR COLPORRHAPHY; ANTERIOR COLPORRHAPHY; ENTRAPERITONEAL COLPOPEXY SACROSPINUS LIGAMENT FIXATION; INSERTION OF VAGINAL GRAFT; CYSTOURETHROSCOPY;  Surgeon: Senia Frederick MD;  Location: [facility];  Service: Gynecology    REDUCTION MAMMAPLASTY Left     TOTAL LAPAROSCOPIC HYSTERECTOMY, SACROCOLPOPEXY N/A 01/07/2020    Procedure: LAPAROSCOPIC UTEROSCRAL LIGAMENT SUSPENSION/SACRAL COLPOPEXY; LAPAROSCOPIC PARAVAGINAL REPAIR; TOTAL LAPAROSCPIC HYSTERECTOMY WITH BILATERAL SALPINGECTOMY;;  Surgeon: Senia Frederick MD;  Location: [facility];  Service: Gynecology    TUBAL ABDOMINAL LIGATION        General Information       Row Name 07/14/23 1534          Physical Therapy Time and Intention    Document Type evaluation  Pt. admitted with abdominal pain  -MS     Mode of Treatment physical therapy;individual therapy  -MS       Row Name 07/14/23 1535          General Information    Patient Profile Reviewed yes  -MS     Prior Level of Function independent:  -MS     Existing Precautions/Restrictions fall  -MS     Barriers to Rehab none identified  -MS       Row Name 07/14/23 1534          Cognition    Orientation Status (Cognition) oriented x 3  -MS       Row Name 07/14/23 1537          Safety Issues, Functional Mobility    Comment, Safety Issues/Impairments (Mobility) Gait belt used for safety.  -MS               User Key  (r) = Recorded By, (t) = Taken By, (c) = Cosigned By      Initials Name Provider Type    MS Amish Smith ROSARIO, PT Physical Therapist                   Mobility       Row Name 07/14/23 153          Bed Mobility    Comment, (Bed Mobility) Up in chair this PM.  -MS       Row Name 07/14/23 1535          Sit-Stand Transfer    Sit-Stand [place] (Transfers) contact guard  -MS       Row Name 07/14/23 1535          Gait/Stairs (Locomotion)    [place] Level (Gait) contact guard  -MS      Distance in Feet (Gait) 200 feet  -MS     Deviations/Abnormal Patterns (Gait) bahman decreased  -MS     Bilateral Gait Deviations forward flexed posture  -MS     Comment, (Gait/Stairs) Verbal/tactile cues given for posture correction.  -MS               User Key  (r) = Recorded By, (t) = Taken By, (c) = Cosigned By      Initials Name Provider Type    Amish Tracy L, PT Physical Therapist                   Obj/Interventions       Row Name 07/14/23 1535          Range of Motion Comprehensive    Comment, General Range of Motion BUE/LLE (WFL's)  -MS       Row Name 07/14/23 1535          Strength Comprehensive (MMT)    Comment, General Manual Muscle Testing (MMT) Assessment BUE/LLE (3+/5)  -MS               User Key  (r) = Recorded By, (t) = Taken By, (c) = Cosigned By      Initials Name Provider Type    Amish Tracy L, PT Physical Therapist                   Goals/Plan       Row Name 07/14/23 1536          Bed Mobility Goal 1 (PT)    Activity/Assistive Device (Bed Mobility Goal 1, PT) bed mobility activities, all  -MS     [place] Level/Cues Needed (Bed Mobility Goal 1, PT) independent  -MS     Time Frame (Bed Mobility Goal 1, PT) long term goal (LTG);1 week  -MS       Row Name 07/14/23 1536          Transfer Goal 1 (PT)    Activity/Assistive Device (Transfer Goal 1, PT) transfers, all  -MS     [place] Level/Cues Needed (Transfer Goal 1, PT) independent  -MS     Time Frame (Transfer Goal 1, PT) long term goal (LTG);1 week  -MS       Row Name 07/14/23 1536          Gait Training Goal 1 (PT)    Activity/Assistive Device (Gait Training Goal 1, PT) gait (walking locomotion)  -MS     [place] Level (Gait Training Goal 1, PT) independent  -MS     Distance (Gait Training Goal 1, PT) 300 feet  -MS     Time Frame (Gait Training Goal 1, PT) long term goal (LTG);1 week  -MS       Row Name 07/14/23 1536          Therapy Assessment/Plan (PT)    Planned Therapy Interventions (PT) balance training;bed  mobility training;gait training;home exercise program;postural re-education;patient/family education;strengthening  -MS               User Key  (r) = Recorded By, (t) = Taken By, (c) = Cosigned By      Initials Name Provider Type    Amish Tracy, PT Physical Therapist                   Clinical Impression       Row Name 07/14/23 1536          Pain    Pretreatment Pain Rating 0/10 - no pain  -MS     Posttreatment Pain Rating 0/10 - no pain  -MS       Row Name 07/14/23 1536          Plan of Care Review    Plan of Care Reviewed With patient  -MS       Row Name 07/14/23 1536          Therapy Assessment/Plan (PT)    Criteria for Skilled Interventions Met (PT) skilled treatment is necessary  -MS     Therapy Frequency (PT) 6 times/wk  -MS       Row Name 07/14/23 1536          Positioning and Restraints    Pre-Treatment Position sitting in chair/recliner  -MS     Post Treatment Position chair  -MS     In Chair notified nsg;reclined;sitting;call light within reach;encouraged to call for assist  -MS               User Key  (r) = Recorded By, (t) = Taken By, (c) = Cosigned By      Initials Name Provider Type    Amish Tracy, PT Physical Therapist                   Outcome Measures       Row Name 07/14/23 1537 07/14/23 0900       How much help from another person do you currently need...    Turning from your back to your side while in flat bed without using bedrails? 4  -MS 4  -MB    Moving from lying on back to sitting on the side of a flat bed without bedrails? 3  -MS 4  -MB    Moving to and from a bed to a chair (including a wheelchair)? 3  -MS 3  -MB    Standing up from a chair using your arms (e.g., wheelchair, bedside chair)? 3  -MS 3  -MB    Climbing 3-5 steps with a railing? 3  -MS 3  -MB    To walk in hospital room? 3  -MS 3  -MB    AM-PAC 6 Clicks Score (PT) 19  -MS 20  -MB    Highest level of mobility 6 --> Walked 10 steps or more  -MS 6 --> Walked 10 steps or more  -MB      Row Name 07/14/23 1538           Functional Assessment    Outcome Measure Options AM-PAC 6 Clicks Basic Mobility (PT)  -MS               User Key  (r) = Recorded By, (t) = Taken By, (c) = Cosigned By      Initials Name Provider Type    MS Amish Smith, PT Physical Therapist    Loren Blanton, RN Registered Nurse                                 Physical Therapy Education       Title: PT OT SLP Therapies (In Progress)       Topic: Physical Therapy (In Progress)       Point: Mobility training (Done)       Learning Progress Summary             Patient Acceptance, E,D, VU,NR by MS at 7/14/2023 1537                         Point: Home exercise program (Not Started)       Learner Progress:  Not documented in this visit.              Point: Body mechanics (Done)       Learning Progress Summary             Patient Acceptance, E,D, VU,NR by MS at 7/14/2023 1537                         Point: Precautions (Done)       Learning Progress Summary             Patient Acceptance, E,D, VU,NR by MS at 7/14/2023 1537                                         User Key       Initials Effective Dates Name Provider Type Discipline    MS 06/16/21 -  Amish Smith, PT Physical Therapist PT                  PT Recommendation and Plan  Planned Therapy Interventions (PT): balance training, bed mobility training, gait training, home exercise program, postural re-education, patient/family education, strengthening  Plan of Care Reviewed With: patient  Outcome Evaluation: Pt. is a 78 year old Female admitted to the hospital with abdominal pain.  Pt. reports that prior to admission she was independent with functional mobility and used no A.D. during ambulation.  Pt. currently presents with decreased strength, decreased balance, and decreased tolerance to functional activity.  This PM, pt. able to ambulate 200 feet, CGA x 1, with no use of A.D. Pt. requires CGA x 1 for sit <-> stand transfers.  Verbal/tactile cues given during ambulation for posture correction.  Pt.  will benefit from skilled inpt. P.T. to address her functional deficits and to assist pt. in regaining her maximum level of independence with functional mobility.     Time Calculation:    PT Charges       Row Name 07/14/23 1539             Time Calculation    Start Time 1350  -MS      Stop Time 1405  -MS      Time Calculation (min) 15 min  -MS      PT Received On 07/14/23  -MS      PT - Next Appointment 07/15/23  -MS      PT Goal Re-Cert Due Date 07/21/23  -MS         Time Calculation- PT    Total Timed Code Minutes- PT 14 minute(s)  -MS                User Key  (r) = Recorded By, (t) = Taken By, (c) = Cosigned By      Initials Name Provider Type    MS Amish Smith, PT Physical Therapist                  Therapy Charges for Today       Code Description Service Date Service Provider Modifiers Qty    82066964993 HC PT EVAL LOW COMPLEXITY 2 7/14/2023 Amish Smith, PT GP 1    04588902525 HC PT THERAPEUTIC ACT EA 15 MIN 7/14/2023 Amish Smith, PT GP 1            PT G-Codes  Outcome Measure Options: AM-PAC 6 Clicks Basic Mobility (PT)  AM-PAC 6 Clicks Score (PT): 19  PT Discharge Summary  Anticipated Discharge Disposition (PT): home with assist, home with home health    Amish Smith, PT  7/14/2023

## 2023-07-14 NOTE — PLAN OF CARE
Goal Outcome Evaluation:           Progress: improving  Outcome Evaluation: VSS. A&Ox4. Pt ambulating to the restroom with stand by assistance this shift. Several loose bowel movements. Briscoe catheter discontinued, pt voiding well at this time. c/o cramping abd pain this shift upon movement. NS infusing @100ml/hr. PO vancomycin given per order. Contact spore precautions in place for c-diff.

## 2023-07-14 NOTE — CONSULTS
CONSULT NOTE    Infectious Diseases - Myah Alcala MD  [facility]       Patient Identification:  Name: Marcia Ulloa  Age: 78 y.o.  Sex: female  :  1945  MRN: [id number]             Date of Consultation: 2023      Primary Care Physician: Bk Mendez MD                               Requesting Physician: Dr. Casper  Reason for Consultation: C. difficile infection    Impression: Patient is a 78-year-old female who has complicated history including history of prolapsed bladder with adjustment of her pessary and history of recurrent urinary tract infection for which in the month of May with IV antibiotics after a course of Macrobid which caused her to have acute kidney injury.  In this background patient presented to the hospital emergency room with low back pain and abdominal pain since the evening of 2023.  She was seen in the emergency room and had CT scan of the abdomen and pelvis performed which revealed pancolitis and urinary retention for which Briscoe catheter was placed.  Patient was started on IV Flagyl because he did not have oral vancomycin available in the emergency department as her stool studies did come back positive for C. difficile toxin by PCR but C. difficile toxin antigen was noted to be negative.  She was noted to have white blood cell count of 14,000 slightly elevated lactic acid level and mild renal insufficiency.  Patient is admitted for further care and has been appropriately started on oral vancomycin and infectious disease service is consulted.  This presentation in the above context is consistent with:  1-acute symptomatic provoked C. difficile colitis with CT scan evidence of pancolitis, systemic signs and symptoms of sepsis due to colitis and positive stool test for C. difficile toxin by PCR with negative test  2-recent UTI  3-urinary retention OB/GYN procedures in the past and recent pessary adjustments  4-history of previous courses of  antibiotic therapy for UTI  5-history of memory issues  Hypertension  Other diagnoses per primary team.    Recommendations/Discussions:  At this juncture regardless of the two-step C. difficile toxin assay result her clinical presentation and CT scan findings and recent provoked antibiotic usage points towards active acute symptomatic C. difficile infection and needs to be treated as such.  This is discussed with the patient as well as Dr. Casper.  Would recommend 10-day course of oral vancomycin with attempts to avoid broad-spectrum antibiotic therapy and monitoring her clinical course.  Given her urological situation and prior history of  interventions she is at risk of recurrent UTIs that could be symptomatic and if it is assessed that she had a symptomatic urinary tract infection future courses of antibiotic treatment should accompany concomitant preemptive treatment of C. difficile infection which would require oral vancomycin to be continued through the course of antibiotic treatment for UTI over the defined infectious process and then 10 days after the completion of antibiotic therapy.  Patient seems to be feeling better with decreased abdominal pain and improving trend of her leukocytosis.  Monitor closely for evolving ileus and low threshold to start vancomycin enema and IV Flagyl if her progress stalls and may require at that stage in her illness general surgery consultation.  Thank you Dr. Casper for letting me be the part of your patient care please see above impression and recommendations      History of Present Illness:   Patient is a 78-year-old female who has complicated history including history of prolapsed bladder with adjustment of her pessary and history of recurrent urinary tract infection for which in the month of May with IV antibiotics after a course of Macrobid which caused her to have acute kidney injury.  In this background patient presented to the hospital emergency room with low  back pain and abdominal pain since the evening of 7/11/2023.  She was seen in the emergency room and had CT scan of the abdomen and pelvis performed which revealed pancolitis and urinary retention for which Briscoe catheter was placed.  Patient was started on IV Flagyl because he did not have oral vancomycin available in the emergency department as her stool studies did come back positive for C. difficile toxin by PCR but C. difficile toxin antigen was noted to be negative.  She was noted to have white blood cell count of 14,000 slightly elevated lactic acid level and mild renal insufficiency.  Patient is admitted for further care and has been appropriately started on oral vancomycin and infectious disease service is consulted.      Past Medical History:  Past Medical History:   Diagnosis Date    Breast cancer 2010    Rt    Drug therapy     femara 5-7 years.    Eczema     History of shingles     Hyperlipidemia     Hypertension     Knee pain     Prolapse of female bladder, acquired     Seasonal allergies     Slow to wake up after anesthesia     SOB (shortness of breath) on exertion     Urinary incontinence      Past Surgical History:  Past Surgical History:   Procedure Laterality Date    ANTERIOR AND POSTERIOR VAGINAL REPAIR N/A 01/07/2020    Procedure: LAPAROSCOPIC/ABDOMINAL ENTEROCELE REPAIR;;  Surgeon: Senia Frederick MD;  Location: [facility];  Service: Gynecology    BREAST BIOPSY Right 2010    malignant in 3 quadrents.    BREAST SURGERY  08/01/2010    mastectomy    KNEE SURGERY      MASTECTOMY Right 2010    PUBOVAGINAL SLING N/A 01/07/2020    Procedure: PUBOVAGINAL SLING; POSTERIOR COLPORRHAPHY; ANTERIOR COLPORRHAPHY; ENTRAPERITONEAL COLPOPEXY SACROSPINUS LIGAMENT FIXATION; INSERTION OF VAGINAL GRAFT; CYSTOURETHROSCOPY;  Surgeon: Senia Frederick MD;  Location: [facility];  Service: Gynecology    REDUCTION MAMMAPLASTY Left     TOTAL LAPAROSCOPIC HYSTERECTOMY, SACROCOLPOPEXY N/A 01/07/2020    Procedure:  LAPAROSCOPIC UTEROSCRAL LIGAMENT SUSPENSION/SACRAL COLPOPEXY; LAPAROSCOPIC PARAVAGINAL REPAIR; TOTAL LAPAROSCPIC HYSTERECTOMY WITH BILATERAL SALPINGECTOMY;;  Surgeon: Senia Frederick MD;  Location: [facility];  Service: Gynecology    TUBAL ABDOMINAL LIGATION        Home Meds:  Medications Prior to Admission   Medication Sig Dispense Refill Last Dose    amLODIPine (NORVASC) 5 MG tablet Take 0.5 tablets by mouth Daily. 90 tablet 2 7/12/2023    aspirin 81 MG EC tablet Take 1 tablet by mouth Daily. HELD   7/11/2023    Calcium Carbonate-Vitamin D (CALCIUM-CARB 600 + D PO) Take 1 tablet by mouth Daily.   7/12/2023    CRANBERRY PO Take 1 tablet by mouth Daily.   7/11/2023    melatonin 3 MG tablet Take 1 tablet by mouth At Night As Needed for Sleep.   7/11/2023    Multiple Vitamin (MULTI VITAMIN DAILY PO) Take 1 tablet by mouth Daily.   7/11/2023    rosuvastatin (CRESTOR) 5 MG tablet Take 1 tablet by mouth Daily. 90 tablet 3 7/11/2023    TURMERIC PO Take 1 tablet by mouth Daily. HELD   7/11/2023    albuterol sulfate  (90 Base) MCG/ACT inhaler Inhale 2 puffs Every 4 (Four) Hours As Needed for Wheezing or Shortness of Air. 18 g 3 Unknown     Current Meds:     Current Facility-Administered Medications:     acetaminophen (TYLENOL) tablet 650 mg, 650 mg, Oral, Q4H PRN, 650 mg at 07/13/23 0731 **OR** acetaminophen (TYLENOL) 160 MG/5ML solution 650 mg, 650 mg, Oral, Q4H PRN **OR** acetaminophen (TYLENOL) suppository 650 mg, 650 mg, Rectal, Q4H PRN, Vicky Bustillos APRN    albuterol (PROVENTIL) nebulizer solution 0.083% 2.5 mg/3mL, 2.5 mg, Nebulization, Q6H PRN, Vicky Bustillos APRN    amLODIPine (NORVASC) tablet 2.5 mg, 2.5 mg, Oral, Daily, Vicky Bustillos APRN, 2.5 mg at 07/13/23 0924    aspirin EC tablet 81 mg, 81 mg, Oral, Daily, Vicky Bustillos, APRN, 81 mg at 07/13/23 0923    Calcium Replacement - Follow Nurse / BPA Driven Protocol, , Does not apply, Apollo GUY Karyn Michele,  MAT    Magnesium Low Dose Replacement - Follow Nurse / BPA Driven Protocol, , Does not apply, PRN, Vicky Bustillos APRN    melatonin tablet 3 mg, 3 mg, Oral, Nightly PRN, Vicky Bustillos APRN    multivitamin (THERAGRAN) tablet 1 tablet, 1 tablet, Oral, Daily, Vicky Bustillos APRN, 1 tablet at 07/13/23 0923    ondansetron (ZOFRAN) tablet 4 mg, 4 mg, Oral, Q6H PRN **OR** ondansetron (ZOFRAN) injection 4 mg, 4 mg, Intravenous, Q6H PRN, Vicky Bustillos APRN    Pharmacy Consult, , Does not apply, Continuous PRN, Sean Casper MD    Phosphorus Replacement - Follow Nurse / BPA Driven Protocol, , Does not apply, PRN, Vicky Bustillos APRN    Potassium Replacement - Follow Nurse / BPA Driven Protocol, , Does not apply, PRN, Vicky Bustillos APRN    rosuvastatin (CRESTOR) tablet 5 mg, 5 mg, Oral, Daily, Vicky Bustillos APRN, 5 mg at 07/13/23 0924    sodium chloride 0.9 % flush 10 mL, 10 mL, Intravenous, PRN, Chance Amin MD    sodium chloride 0.9 % infusion, 125 mL/hr, Intravenous, Continuous, Sean Casper MD, Last Rate: 125 mL/hr at 07/14/23 0100, 125 mL/hr at 07/14/23 0100    vancomycin (VANCOCIN) capsule 125 mg, 125 mg, Oral, Q6H, Sean Casper MD, 125 mg at 07/14/23 0628  Allergies:  Allergies   Allergen Reactions    Penicillins Unknown (See Comments)     UNSURE-AS A CHILD     Social History:   Social History     Tobacco Use    Smoking status: Never    Smokeless tobacco: Never   Substance Use Topics    Alcohol use: Yes     Comment: 1/day      Family History:  Family History   Problem Relation Age of Onset    COPD Mother     Heart failure Mother     Hypertension Mother     Heart disease Mother     Depression Mother     Vision loss Mother     Heart failure Father     Hypertension Father     Heart disease Father     Vision loss Maternal Grandfather     Malig Hyperthermia Neg Hx     Breast cancer Neg Hx     Ovarian cancer Neg  "Hx           Review of Systems  See history of present illness and past medical history.    Overall feeling better at this time but at the time of admission review system was recorded as follows:      Vitals:   /70 (BP Location: Left arm, Patient Position: Lying)   Pulse 93   Temp 99.7 °F (37.6 °C) (Oral)   Resp 16   Ht 167.6 cm (66\")   Wt 70.9 kg (156 lb 4.9 oz)   SpO2 94%   BMI 25.23 kg/m²   I/O:   Intake/Output Summary (Last 24 hours) at 7/14/2023 0656  Last data filed at 7/14/2023 0315  Gross per 24 hour   Intake 2120 ml   Output 3450 ml   Net -1330 ml     Exam:  Patient is examined using the personal protective equipment as per guidelines from infection control for this particular patient as enacted.  Hand washing was performed before and after patient interaction.  General Appearance:    Alert, cooperative, no distress, appears stated age   Head:    Normocephalic, without obvious abnormality, atraumatic   Eyes:    PERRL, conjunctivae/corneas clear, EOM's intact, both eyes   Ears:    Normal external ear canals, both ears   Nose:   Nares normal, septum midline, mucosa normal, no drainage    or sinus tenderness   Throat:   Lips, tongue, gums normal; oral mucosa pink and moist   Neck:   Supple, symmetrical, trachea midline, no adenopathy;     thyroid:  no enlargement/tenderness/nodules; no carotid    bruit or JVD   Back:     Symmetric, no curvature, ROM normal, no CVA tenderness   Lungs:     Clear to auscultation bilaterally, respirations unlabored   Chest Wall:    No tenderness or deformity    Heart:  S1-S2 regular   Abdomen:   Soft mild generalized tenderness no obvious distention noted   Extremities: No rash or edema noted   Pulses:   Pulses palpable in all extremities; symmetric all extremities   Skin: No rash noted   Neurologic: Awake interactive pleasant and grossly nonfocal examination       Data Review:    I reviewed the patient's new clinical results.  Results from last 7 days   Lab Units " 07/13/23  1659 07/13/23  0807 07/12/23  0950   WBC 10*3/mm3  --  21.67* 14.02*   HEMOGLOBIN g/dL 12.7 12.6 13.3   PLATELETS 10*3/mm3  --  236 253     Results from last 7 days   Lab Units 07/13/23  1659 07/13/23  0807 07/12/23  0950   SODIUM mmol/L  --  137 138   POTASSIUM mmol/L 4.0 3.6 3.7   CHLORIDE mmol/L  --  106 107   CO2 mmol/L  --  23.1 21.5*   BUN mg/dL  --  14 17   CREATININE mg/dL  --  1.05* 1.19*   CALCIUM mg/dL  --  8.8 10.1   GLUCOSE mg/dL  --  119* 164*     Microbiology Results (last 10 days)       Procedure Component Value - Date/Time    Blood Culture - Blood, Wrist, Left [[id number]]  (Normal) Collected: 07/12/23 1406    Lab Status: Preliminary result Specimen: Blood from Wrist, Left Updated: 07/14/23 1415     Blood Culture No growth at 2 days    Blood Culture - Blood, Arm, Right [[id number]]  (Normal) Collected: 07/12/23 1359    Lab Status: Preliminary result Specimen: Blood from Arm, Right Updated: 07/14/23 1415     Blood Culture No growth at 2 days    Clostridioides difficile Toxin - Stool, Per Rectum [[id number]]  (Abnormal) Collected: 07/12/23 1051    Lab Status: Final result Specimen: Stool from Per Rectum Updated: 07/12/23 1624    Narrative:      The following orders were created for panel order Clostridioides difficile Toxin - Stool, Per Rectum.  Procedure                               Abnormality         Status                     ---------                               -----------         ------                     Clostridioides difficile...[[id number]]  Abnormal            Final result                 Please view results for these tests on the individual orders.    Gastrointestinal Panel, PCR - Stool, Per Rectum [[id number]]  (Normal) Collected: 07/12/23 1051    Lab Status: Final result Specimen: Stool from Per Rectum Updated: 07/12/23 1617     Campylobacter Not Detected     Plesiomonas shigelloides Not Detected     Salmonella Not Detected     Vibrio Not Detected     Vibrio cholerae Not  Detected     Yersinia enterocolitica Not Detected     Enteroaggregative E. coli (EAEC) Not Detected     Enteropathogenic E. coli (EPEC) Not Detected     Enterotoxigenic E. coli (ETEC) lt/st Not Detected     Shiga-like toxin-producing E. coli (STEC) stx1/stx2 Not Detected     Shigella/Enteroinvasive E. coli (EIEC) Not Detected     Cryptosporidium Not Detected     Cyclospora cayetanensis Not Detected     Entamoeba histolytica Not Detected     Giardia lamblia Not Detected     Adenovirus F40/41 Not Detected     Astrovirus Not Detected     Norovirus GI/GII Not Detected     Rotavirus A Not Detected     Sapovirus (I, II, IV or V) Not Detected    Narrative:      If Aeromonas, Staphylococcus aureus or Bacillus cereus are suspected, please order [id number]: Stool Culture, Aeromonas, S aureus, B Cereus.    Clostridioides difficile Toxin, PCR - Stool, Per Rectum [[id number]]  (Abnormal) Collected: 07/12/23 1051    Lab Status: Final result Specimen: Stool from Per Rectum Updated: 07/12/23 1624     Toxigenic C. difficile by PCR Positive    Narrative:      DNA from a toxigenic strain of C.difficile has been detected. Antigen testing for the presence of free C.difficile toxin is currently in progress, to help determine the clinical significance of this PCR result.     Clostridioides difficile toxin Ag, Reflex - Stool, Per Rectum [[id number]]  (Normal) Collected: 07/12/23 1051    Lab Status: Final result Specimen: Stool from Per Rectum Updated: 07/12/23 1728     C.diff Toxin Ag Negative    Narrative:      DNA from a toxigenic strain of C.difficile was detected, although the free toxin itself was not detected. These findings are consistent with C.difficile colonization and may not reflect actual C.difficile infection. Clinical correlation needed.          CT Abdomen Pelvis With Contrast    Result Date: 7/12/2023  1. Pancolitis, likely infectious or inflammatory etiology 2. FINDINGS suggestive of bladder outlet obstruction 3. Incidental  findings as above.  This report was finalized on 7/12/2023 12:47 PM by Dr. Osvaldo Ramos M.D.         Assessment:  Active Hospital Problems    Diagnosis  POA    Sepsis without acute organ dysfunction [A41.9]  Yes    Acute urinary retention [R33.8]  Unknown    C. difficile colitis [A04.72]  Yes    Stage 3 chronic kidney disease [N18.30]  Yes    Essential hypertension [I10]  Yes      Resolved Hospital Problems   No resolved problems to display.         Plan:  See above  Myah Marquez MD   7/14/2023  06:56 EDT    Parts of this note may be an electronic transcription/translation of spoken language to printed text using the Dragon dictation system.

## 2023-07-14 NOTE — PLAN OF CARE
Goal Outcome Evaluation:  Plan of Care Reviewed With: patient           Outcome Evaluation: Pt. is a 78 year old Female admitted to the hospital with abdominal pain.  Pt. reports that prior to admission she was independent with functional mobility and used no A.D. during ambulation.  Pt. currently presents with decreased strength, decreased balance, and decreased tolerance to functional activity.  This PM, pt. able to ambulate 200 feet, CGA x 1, with no use of A.D. Pt. requires CGA x 1 for sit <-> stand transfers.  Verbal/tactile cues given during ambulation for posture correction.  Pt. will benefit from skilled inpt. P.T. to address her functional deficits and to assist pt. in regaining her maximum level of independence with functional mobility.      Anticipated Discharge Disposition (PT): home with assist    Patient was not wearing a face mask during this therapy encounter. Therapist used appropriate personal protective equipment including eye protection, mask, and gloves.  Mask used was standard procedure mask. Appropriate PPE was worn during the entire therapy session. Hand hygiene was completed before and after therapy session. Patient is not in enhanced droplet precautions.

## 2023-07-15 VITALS
WEIGHT: 151.24 LBS | HEIGHT: 66 IN | SYSTOLIC BLOOD PRESSURE: 142 MMHG | RESPIRATION RATE: 16 BRPM | TEMPERATURE: 98.6 F | BODY MASS INDEX: 24.31 KG/M2 | DIASTOLIC BLOOD PRESSURE: 84 MMHG | OXYGEN SATURATION: 96 % | HEART RATE: 84 BPM

## 2023-07-15 LAB
ANION GAP SERPL CALCULATED.3IONS-SCNC: 8.6 MMOL/L (ref 5–15)
BUN SERPL-MCNC: 12 MG/DL (ref 8–23)
BUN/CREAT SERPL: 13.5 (ref 7–25)
CALCIUM SPEC-SCNC: 8.5 MG/DL (ref 8.6–10.5)
CHLORIDE SERPL-SCNC: 108 MMOL/L (ref 98–107)
CO2 SERPL-SCNC: 23.4 MMOL/L (ref 22–29)
CREAT SERPL-MCNC: 0.89 MG/DL (ref 0.57–1)
DEPRECATED RDW RBC AUTO: 44.5 FL (ref 37–54)
EGFRCR SERPLBLD CKD-EPI 2021: 66.9 ML/MIN/1.73
ERYTHROCYTE [DISTWIDTH] IN BLOOD BY AUTOMATED COUNT: 13.1 % (ref 12.3–15.4)
GLUCOSE SERPL-MCNC: 101 MG/DL (ref 65–99)
HCT VFR BLD AUTO: 36.5 % (ref 34–46.6)
HGB BLD-MCNC: 12.4 G/DL (ref 12–15.9)
MCH RBC QN AUTO: 31.5 PG (ref 26.6–33)
MCHC RBC AUTO-ENTMCNC: 34 G/DL (ref 31.5–35.7)
MCV RBC AUTO: 92.6 FL (ref 79–97)
PLATELET # BLD AUTO: 233 10*3/MM3 (ref 140–450)
PMV BLD AUTO: 10.9 FL (ref 6–12)
POTASSIUM SERPL-SCNC: 3.9 MMOL/L (ref 3.5–5.2)
RBC # BLD AUTO: 3.94 10*6/MM3 (ref 3.77–5.28)
SODIUM SERPL-SCNC: 140 MMOL/L (ref 136–145)
WBC NRBC COR # BLD: 15.98 10*3/MM3 (ref 3.4–10.8)

## 2023-07-15 PROCEDURE — 80048 BASIC METABOLIC PNL TOTAL CA: CPT | Performed by: INTERNAL MEDICINE

## 2023-07-15 PROCEDURE — 85027 COMPLETE CBC AUTOMATED: CPT | Performed by: INTERNAL MEDICINE

## 2023-07-15 RX ORDER — VANCOMYCIN HYDROCHLORIDE 125 MG/1
125 CAPSULE ORAL EVERY 6 HOURS SCHEDULED
Qty: 32 CAPSULE | Refills: 0 | Status: SHIPPED | OUTPATIENT
Start: 2023-07-15 | End: 2023-07-23

## 2023-07-15 RX ADMIN — VANCOMYCIN HYDROCHLORIDE 125 MG: 125 CAPSULE ORAL at 01:40

## 2023-07-15 RX ADMIN — SODIUM CHLORIDE 100 ML/HR: 9 INJECTION, SOLUTION INTRAVENOUS at 06:17

## 2023-07-15 RX ADMIN — ROSUVASTATIN CALCIUM 5 MG: 5 TABLET, FILM COATED ORAL at 08:29

## 2023-07-15 RX ADMIN — ASPIRIN 81 MG: 81 TABLET, COATED ORAL at 08:29

## 2023-07-15 RX ADMIN — AMLODIPINE BESYLATE 2.5 MG: 2.5 TABLET ORAL at 08:29

## 2023-07-15 RX ADMIN — VANCOMYCIN HYDROCHLORIDE 125 MG: 125 CAPSULE ORAL at 06:17

## 2023-07-15 RX ADMIN — Medication 1 TABLET: at 08:29

## 2023-07-15 NOTE — CASE MANAGEMENT/SOCIAL WORK
Case Management Discharge Note      Final Note: DC Home         Selected Continued Care - Discharged on 7/15/2023 Admission date: 7/12/2023 - Discharge disposition: Home or Self Care      Destination    No services have been selected for the patient.                Durable Medical Equipment    No services have been selected for the patient.                Dialysis/Infusion    No services have been selected for the patient.                Home Medical Care    No services have been selected for the patient.                Therapy    No services have been selected for the patient.                Community Resources    No services have been selected for the patient.                Community & DME    No services have been selected for the patient.                         Final Discharge Disposition Code: 01 - home or self-care

## 2023-07-15 NOTE — DISCHARGE SUMMARY
Date of Admission: 7/12/2023  Date of Discharge:  7/15/2023  Primary Care Physician: Bk Mendez MD     Discharge Diagnosis:  Active Hospital Problems    Diagnosis  POA    **C. difficile colitis [A04.72]  Yes    Sepsis without acute organ dysfunction [A41.9]  Yes    Acute urinary retention [R33.8]  Unknown    Stage 3 chronic kidney disease [N18.30]  Yes    Essential hypertension [I10]  Yes      Resolved Hospital Problems   No resolved problems to display.       DETAILS OF HOSPITAL STAY     Pertinent Test Results and Procedures Performed    CT scan of the abdomen and pelvis:  1. Pancolitis, likely infectious or inflammatory etiology   2. FINDINGS suggestive of bladder outlet obstruction   3. Incidental findings as above.     Hospital Course  This is a 78-year-old female who presented to the emergency room with complaints of left lower quadrant pain.  She had a recent antibiotic exposure.  She tested positive for C. difficile and was found to have pancolitis on CT.  Please see H&P for full details of admission.  She was started on oral vancomycin.  Infectious disease was consulted who recommended she maintain this for a total of 10 days.  Her abdominal pain is improving.  Leukocytosis is also improving and she is afebrile.  Over the past 24 hours she has had 2 loose bowel movements.  She has noticed some blood in her stool but hemoglobin is remained stable.  She will be discharged home today to continue the full course of treatment for the C. difficile with oral vancomycin.  Infectious disease recommends that she receive judicious use of antibiotics moving forward and should she require antibiotics in the future they recommend she simultaneously be placed on a 10-day course of oral vancomycin to prevent recurrence of C. difficile.  At this point she is medically stable and be discharged back home.    Physical Exam at Discharge:  General: No acute distress, AAOx3  HEENT: EOMI, PERRL  Cardiovascular: +s1 and s2,  RRR  Lungs: No rhonchi or wheezing  Abdomen: soft, nontender    Consults:   Consults       Date and Time Order Name Status Description    7/13/2023  9:22 AM Inpatient Infectious Diseases Consult Completed               Condition on Discharge: Stable, improved    Discharge Disposition  Home or Self Care    Discharge Medications     Discharge Medications        New Medications        Instructions Start Date   vancomycin 125 MG capsule  Commonly known as: VANCOCIN   125 mg, Oral, Every 6 Hours Scheduled             Continue These Medications        Instructions Start Date   albuterol sulfate  (90 Base) MCG/ACT inhaler  Commonly known as: PROVENTIL HFA;VENTOLIN HFA;PROAIR HFA   2 puffs, Inhalation, Every 4 Hours PRN      amLODIPine 5 MG tablet  Commonly known as: NORVASC   2.5 mg, Oral, Daily      aspirin 81 MG EC tablet   81 mg, Oral, Daily, HELD      CALCIUM-CARB 600 + D PO   1 tablet, Oral, Daily      CRANBERRY PO   1 tablet, Oral, Daily      melatonin 3 MG tablet   3 mg, Oral, Nightly PRN      multivitamin tablet tablet  Commonly known as: THERAGRAN   1 tablet, Oral, Daily      rosuvastatin 5 MG tablet  Commonly known as: CRESTOR   5 mg, Oral, Daily      TURMERIC PO   1 tablet, Oral, Daily, HELD               Discharge Diet:   Diet Instructions       Diet: Regular/House Diet, Cardiac Diets; Healthy Heart (2-3 Na+); Regular Texture (IDDSI 7); Thin (IDDSI 0)      Discharge Diet:  Regular/House Diet  Cardiac Diets       Cardiac Diet: Healthy Heart (2-3 Na+)    Texture: Regular Texture (IDDSI 7)    Fluid Consistency: Thin (IDDSI 0)            Activity at Discharge:   Activity Instructions       Activity as Tolerated              Follow-up Appointments  Future Appointments   Date Time Provider Department Center   7/25/2023  2:30 PM Bk Mendez MD [facility]   7/31/2023  8:00 AM Carlos Stokes MD [facility]     Additional Instructions for the Follow-ups that You Need to Schedule       Discharge  Follow-up with PCP   As directed       Currently Documented PCP:    Bk Mendez MD    PCP Phone Number:    [phone]     Follow Up Details: 1 week                 Test Results Pending at Discharge  Pending Labs       Order Current Status    Blood Culture - Blood, Arm, Right Preliminary result    Blood Culture - Blood, Wrist, Left Preliminary result            I have examined and discussed discharge planning with the patient today.    I wore full protective equipment throughout the patient encounter including eye protection and facemask.  Hand hygiene was performed before donning protective equipment and after removal when leaving the room.     Sean Casper MD  07/15/23  10:15 EDT    Time: Discharge greater than 30 min

## 2023-07-17 LAB
BACTERIA SPEC AEROBE CULT: NORMAL
BACTERIA SPEC AEROBE CULT: NORMAL

## 2023-07-28 ENCOUNTER — TELEPHONE (OUTPATIENT)
Dept: INTERNAL MEDICINE | Facility: CLINIC | Age: 78
End: 2023-07-28

## 2023-07-28 NOTE — TELEPHONE ENCOUNTER
PATIENT CALLED AND NEEDS TO RESCHEDULE HER HOSPITAL VISIT FROM 7/25/23.  SHE STATES SHE HAS AN APPOINTMENT ON 8/2/23 BUT NOT LISTED IN CHART    PLEASE CALL AND ADVISE [phone]

## 2023-07-31 ENCOUNTER — OFFICE VISIT (OUTPATIENT)
Dept: NEUROLOGY | Facility: CLINIC | Age: 78
End: 2023-07-31
Payer: MEDICARE

## 2023-07-31 ENCOUNTER — LAB (OUTPATIENT)
Dept: LAB | Facility: HOSPITAL | Age: 78
End: 2023-07-31
Payer: MEDICARE

## 2023-07-31 VITALS
BODY MASS INDEX: 23.88 KG/M2 | HEIGHT: 66 IN | OXYGEN SATURATION: 98 % | DIASTOLIC BLOOD PRESSURE: 78 MMHG | SYSTOLIC BLOOD PRESSURE: 128 MMHG | HEART RATE: 74 BPM | WEIGHT: 148.6 LBS

## 2023-07-31 DIAGNOSIS — R41.9 COGNITIVE COMPLAINTS: ICD-10-CM

## 2023-07-31 DIAGNOSIS — R41.9 COGNITIVE COMPLAINTS: Primary | ICD-10-CM

## 2023-07-31 LAB
FOLATE SERPL-MCNC: >20 NG/ML (ref 4.78–24.2)
TSH SERPL DL<=0.05 MIU/L-ACNC: 1.97 UIU/ML (ref 0.27–4.2)
VIT B12 BLD-MCNC: 1372 PG/ML (ref 211–946)

## 2023-07-31 PROCEDURE — 84425 ASSAY OF VITAMIN B-1: CPT | Performed by: STUDENT IN AN ORGANIZED HEALTH CARE EDUCATION/TRAINING PROGRAM

## 2023-07-31 PROCEDURE — 36415 COLL VENOUS BLD VENIPUNCTURE: CPT

## 2023-07-31 PROCEDURE — 84443 ASSAY THYROID STIM HORMONE: CPT | Performed by: STUDENT IN AN ORGANIZED HEALTH CARE EDUCATION/TRAINING PROGRAM

## 2023-07-31 PROCEDURE — 82607 VITAMIN B-12: CPT

## 2023-07-31 PROCEDURE — 82746 ASSAY OF FOLIC ACID SERUM: CPT

## 2023-07-31 NOTE — PROGRESS NOTES
Chief Complaint   Patient presents with    Memory Loss       Patient ID: Marcia Ulloa is a 78 y.o. female.    HPI:    Ms. Marcia Ulloa is a 78-year-old female who presents to neurology clinic for evaluation of memory. She was referred by Dr. Mendez. She is accompanied by her  today.     The patient reports that her and  have noticed her memory issues. She states that she cannot remember people's names. She notes that this has been a challenge for her for at least 7 years. She states that she spent 6 months in [place] and 6 months in [place]. She notes that she must remember the people's names in [place] and the bird names too. She states that when she comes back to [place], she must remember the names of people and birds here. She notes that she is an avid bird watcher. She states that she feeds the birds in her yard every day. She notes that she is having trouble remembering the woodpeckers. She states that it takes her longer to think which is the right bird. She notes that she has a book, and she can always look in the book, but just to have it click does not happen. She notes that most of the time they are indoors while watching the birds. She states that she does have a hummingbird and she does not have any trouble remembering that one when it appears. She notes that she does use binoculars.     The patient reports that her short-term memory and her long-term memory are better some days versus others. Her  states that he is 2 years older than her, and she looks to her to answer questions frequently. He notes that she was having trouble with her medical history putting down what she had previously.     The patient reports that she can dress herself, she can take care of her own hygiene without assistance as she notes that she likes to choose what she wears. She confirms that she remembers to eat meals and she likes to cook. She states that her  does all the yard work and manages  the finances, although she could pay the bills if she needs to. The patient notes that they have a company that they work with for their finances.  She notes that her  started doing the vacuuming after she had breast cancer, which she notes that she had breast cancer in 08/2010. She notes that she will get her mammogram in 08/2023 and gets a scan every year to monitor.    She states that she can put together and take her medications on her own. The patient confirms that she can drive most of the time and feels comfortable with driving. She denies any pattern of multiple car accidents in the last year or close calls. She states that she drinks alcohol. She notes that she enjoys [employer]. She states that sometimes she might do more than one shot, but lately she has not been doing any because of her other health issues.     The patient reports that in 08/2022, her  decided to take his grandson on a trip, and she was by herself for 2 weeks. She states that it was a real adjustment. She notes that she did not know how to make coffee, so she had to learn how. She states that they have 7 clocks in the house, and she has to wind them once a week for the correct time. She notes that she did okay with that.  She notes that her daughter and her family live in the city and they are good at making sure she is okay. She states that when they are in [place], their son and his wife have a 17-year-old, but they are about a 3-hour drive to visit them while in [place].    The patient reports that she is not doing what she wants to do for exercising. She states that she likes to get in the swimming pool and do water aerobics. She notes that she cannot do that right now because of her stomach as she had colitis and was in the hospital for 3 days. Her  notes that she was hospitalized 2.5 weeks ago. She states that she had some previous issues with urinary tract infection. She confirms that she tries to do  a little bit of walking every day to get exercise.     The patient reports that she takes rosuvastatin. She states that she brought all her medications with her today. She notes that her blood pressure has been low. She states that she has a device that she puts on her arm. She notes that her  helps her to make sure she got it on that right. She states that she is taking medication to keep it well.    The patient reports that she is a college graduate. She states that she is a retired . 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      The following portions of the patient's history were reviewed and updated as appropriate: allergies, current medications, past family history, past medical history, past social history, past surgical history and problem list.    Review of Systems   Neurological:  Negative for dizziness, tremors, seizures, syncope, facial asymmetry, speech difficulty, weakness, light-headedness, numbness and headaches.   Psychiatric/Behavioral:  Positive for confusion and decreased concentration. Negative for agitation, behavioral problems, dysphoric mood, hallucinations, self-injury, sleep disturbance and suicidal ideas. The patient is not nervous/anxious and is not hyperactive.         Retired . Having trouble with names of peoples and birds.  manages finances but always has. Cooks and drives still.    There were no vitals filed for this visit.    Neurologic Exam     Mental Status   SLUMS July 2023 27/30. 3/5 delayed recall. Names 10 animals, some related speech during this with some nonchalance to minute timer     Physical Exam    Procedures    Assessment/Plan:         Diagnoses and all orders for this visit:    1. Cognitive complaints (Primary)  -     MRI Brain With & Without Contrast; Future  -     Vitamin B12; Future  -     Folate; Future  -     TSH Rfx On Abnormal To Free T4  -     Vitamin B1, Whole Blood         The patient has on  testing a 27 out of 30 SLUMS test. This is still potentially within the normal range, though it is 1 point away from mild cognitive impairment and given her education level this may reflect a higher amoutn of impairment. She also had some tangential speech during the animal naming but was still able to name 10 animals. We will continue to assess her memory over time. The patient spends time in multiple states in [place] and she will not be here in 6 months. Therefore, I propose that we see her the month she comes back from [place], which will be 05/2024. They were agreeable to this plan, and I set an appointment for approximately 10 months so that I can reassess her memory after she comes back. I discussed the Merlin Bird ID michell, which she already had installed on her phone, but she does not use. Also, we talked about cardiovascular risk factors, and the importance of keeping this this in a safe way. I will obtain lab work to look at various vitamin levels and contact the patient if any supplementation needs to be started.   I will obtain a contrasted MRI brain with and without contrast for further evaluation of her memory. We plan to follow up in 10 months.      I spent 61 minutes caring for this patient on this date of service. This time includes time spent by me in the following activities as necessary: preparing for the visit, reviewing tests, medical records and previous visits, obtaining and/or reviewing a separately obtained history, performing a medically appropriate exam and/or evaluation, counseling and educating the patient, and/or communicating with other healthcare professionals, documenting information in the medical record, independently interpreting results and communicating that information with the patient, and developing a medically appropriate treatment plan with consideration of other conditions, medications, and treatments.      Transcribed from ambient dictation for Carlos Stokes MD by Claudia  Raeann.  07/31/23   11:03 EDT      Patient or patient representative verbalized consent to the visit recording.  I have personally performed the services described in this document as transcribed by the above individual, and it is both accurate and complete.  Carlos Stokes MD  8/12/2023  18:17 EDT

## 2023-07-31 NOTE — LETTER
July 31, 2023     Bk Mendez MD  [id number] [facility]  [address]  [place] [phone]    Patient: Marcia Ulloa   YOB: 1945   Date of Visit: 7/31/2023     Dear Bk Mendez MD:       Thank you for referring Marcia Ulloa to me for evaluation. Below are the relevant portions of my assessment and plan of care.    If you have questions, please do not hesitate to call me. I look forward to following Marcia along with you.         Sincerely,        Carlos Stokes MD        CC: Carlos Lugo MD  07/31/23 0852  Incomplete  Chief Complaint   Patient presents with    Memory Loss       Patient ID: Marcia Ulloa is a 78 y.o. female.    HPI:    The following portions of the patient's history were reviewed and updated as appropriate: allergies, current medications, past family history, past medical history, past social history, past surgical history and problem list.    Review of Systems   Neurological:  Negative for dizziness, tremors, seizures, syncope, facial asymmetry, speech difficulty, weakness, light-headedness, numbness and headaches.   Psychiatric/Behavioral:  Positive for confusion and decreased concentration. Negative for agitation, behavioral problems, dysphoric mood, hallucinations, self-injury, sleep disturbance and suicidal ideas. The patient is not nervous/anxious and is not hyperactive.         Retired . Having trouble with names of peoples and birds.  manages finances but always has. Cooks and drives still.    There were no vitals filed for this visit.    Neurologic Exam     Mental Status   SLUMS July 2023 27/30. 3/5 delayed recall. Names 10 animals, some related speech during this.     Physical Exam    Procedures    Assessment/Plan:         There are no diagnoses linked to this encounter.       Marcia Fields MA

## 2023-07-31 NOTE — LETTER
August 12, 2023     Bk Mendez MD  [id number] [facility]  [address]  [place] [id number]    Patient: Marcia Ulloa   YOB: 1945   Date of Visit: 7/31/2023     Dear Bk Mendez MD:       Thank you for referring Marcia Ulloa to me for evaluation. Below are the relevant portions of my assessment and plan of care.    If you have questions, please do not hesitate to call me. I look forward to following Marcia along with you.         Sincerely,        Carlos Stokes MD        CC: No Recipients    Carlos Stokes MD  08/12/23 1818  Sign when Signing Visit  Chief Complaint   Patient presents with    Memory Loss       Patient ID: Marcia Ulloa is a 78 y.o. female.    HPI:    Ms. Marcia Ulloa is a 78-year-old female who presents to neurology clinic for evaluation of memory. She was referred by Dr. Mendez. She is accompanied by her  today.     The patient reports that her and  have noticed her memory issues. She states that she cannot remember people's names. She notes that this has been a challenge for her for at least 7 years. She states that she spent 6 months in [place] and 6 months in [place]. She notes that she must remember the people's names in [place] and the bird names too. She states that when she comes back to [place], she must remember the names of people and birds here. She notes that she is an avid bird watcher. She states that she feeds the birds in her yard every day. She notes that she is having trouble remembering the woodpeckers. She states that it takes her longer to think which is the right bird. She notes that she has a book, and she can always look in the book, but just to have it click does not happen. She notes that most of the time they are indoors while watching the birds. She states that she does have a hummingbird and she does not have any trouble remembering that one when it appears. She notes that she does use binoculars.     The patient reports that her  short-term memory and her long-term memory are better some days versus others. Her  states that he is 2 years older than her, and she looks to her to answer questions frequently. He notes that she was having trouble with her medical history putting down what she had previously.     The patient reports that she can dress herself, she can take care of her own hygiene without assistance as she notes that she likes to choose what she wears. She confirms that she remembers to eat meals and she likes to cook. She states that her  does all the yard work and manages the finances, although she could pay the bills if she needs to. The patient notes that they have a company that they work with for their finances.  She notes that her  started doing the vacuuming after she had breast cancer, which she notes that she had breast cancer in 08/2010. She notes that she will get her mammogram in 08/2023 and gets a scan every year to monitor.    She states that she can put together and take her medications on her own. The patient confirms that she can drive most of the time and feels comfortable with driving. She denies any pattern of multiple car accidents in the last year or close calls. She states that she drinks alcohol. She notes that she enjoys [employer]. She states that sometimes she might do more than one shot, but lately she has not been doing any because of her other health issues.     The patient reports that in 08/2022, her  decided to take his grandson on a trip, and she was by herself for 2 weeks. She states that it was a real adjustment. She notes that she did not know how to make coffee, so she had to learn how. She states that they have 7 clocks in the house, and she has to wind them once a week for the correct time. She notes that she did okay with that.  She notes that her daughter and her family live in the city and they are good at making sure she is okay. She states that when  they are in [place], their son and his wife have a 17-year-old, but they are about a 3-hour drive to visit them while in [place].    The patient reports that she is not doing what she wants to do for exercising. She states that she likes to get in the swimming pool and do water aerobics. She notes that she cannot do that right now because of her stomach as she had colitis and was in the hospital for 3 days. Her  notes that she was hospitalized 2.5 weeks ago. She states that she had some previous issues with urinary tract infection. She confirms that she tries to do a little bit of walking every day to get exercise.     The patient reports that she takes rosuvastatin. She states that she brought all her medications with her today. She notes that her blood pressure has been low. She states that she has a device that she puts on her arm. She notes that her  helps her to make sure she got it on that right. She states that she is taking medication to keep it well.    The patient reports that she is a college graduate. She states that she is a retired . 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      The following portions of the patient's history were reviewed and updated as appropriate: allergies, current medications, past family history, past medical history, past social history, past surgical history and problem list.    Review of Systems   Neurological:  Negative for dizziness, tremors, seizures, syncope, facial asymmetry, speech difficulty, weakness, light-headedness, numbness and headaches.   Psychiatric/Behavioral:  Positive for confusion and decreased concentration. Negative for agitation, behavioral problems, dysphoric mood, hallucinations, self-injury, sleep disturbance and suicidal ideas. The patient is not nervous/anxious and is not hyperactive.         Retired . Having trouble with names of peoples and birds.  manages finances but  always has. Cooks and drives still.    There were no vitals filed for this visit.    Neurologic Exam     Mental Status   SLUMS July 2023 27/30. 3/5 delayed recall. Names 10 animals, some related speech during this with some nonchalance to minute timer     Physical Exam    Procedures    Assessment/Plan:         Diagnoses and all orders for this visit:    1. Cognitive complaints (Primary)  -     MRI Brain With & Without Contrast; Future  -     Vitamin B12; Future  -     Folate; Future  -     TSH Rfx On Abnormal To Free T4  -     Vitamin B1, Whole Blood         The patient has on testing a 27 out of 30 SLUMS test. This is still potentially within the normal range, though it is 1 point away from mild cognitive impairment and given her education level this may reflect a higher amoutn of impairment. She also had some tangential speech during the animal naming but was still able to name 10 animals. We will continue to assess her memory over time. The patient spends time in multiple states in [place] and she will not be here in 6 months. Therefore, I propose that we see her the month she comes back from [place], which will be 05/2024. They were agreeable to this plan, and I set an appointment for approximately 10 months so that I can reassess her memory after she comes back. I discussed the Merlin Bird ID michell, which she already had installed on her phone, but she does not use. Also, we talked about cardiovascular risk factors, and the importance of keeping this this in a safe way. I will obtain lab work to look at various vitamin levels and contact the patient if any supplementation needs to be started.   I will obtain a contrasted MRI brain with and without contrast for further evaluation of her memory. We plan to follow up in 10 months.      I spent 61 minutes caring for this patient on this date of service. This time includes time spent by me in the following activities as necessary: preparing for the visit, reviewing  tests, medical records and previous visits, obtaining and/or reviewing a separately obtained history, performing a medically appropriate exam and/or evaluation, counseling and educating the patient, and/or communicating with other healthcare professionals, documenting information in the medical record, independently interpreting results and communicating that information with the patient, and developing a medically appropriate treatment plan with consideration of other conditions, medications, and treatments.      Transcribed from ambient dictation for Carlos Stokes MD by Claudia Cary.  07/31/23   11:03 EDT      Patient or patient representative verbalized consent to the visit recording.  I have personally performed the services described in this document as transcribed by the above individual, and it is both accurate and complete.  Carlos Stokes MD  8/12/2023  18:17 EDT

## 2023-08-02 ENCOUNTER — TELEPHONE (OUTPATIENT)
Dept: INTERNAL MEDICINE | Facility: CLINIC | Age: 78
End: 2023-08-02

## 2023-08-02 LAB — VIT B1 BLD-SCNC: 184.9 NMOL/L (ref 66.5–200)

## 2023-08-04 ENCOUNTER — TRANSCRIBE ORDERS (OUTPATIENT)
Dept: ADMINISTRATIVE | Facility: HOSPITAL | Age: 78
End: 2023-08-04
Payer: MEDICARE

## 2023-08-04 DIAGNOSIS — Z12.31 VISIT FOR SCREENING MAMMOGRAM: Primary | ICD-10-CM

## 2023-08-10 ENCOUNTER — PATIENT ROUNDING (BHMG ONLY) (OUTPATIENT)
Dept: NEUROLOGY | Facility: CLINIC | Age: 78
End: 2023-08-10
Payer: MEDICARE

## 2023-08-11 ENCOUNTER — OFFICE VISIT (OUTPATIENT)
Dept: INTERNAL MEDICINE | Facility: CLINIC | Age: 78
End: 2023-08-11
Payer: MEDICARE

## 2023-08-11 VITALS
HEIGHT: 66 IN | SYSTOLIC BLOOD PRESSURE: 118 MMHG | BODY MASS INDEX: 23.25 KG/M2 | WEIGHT: 144.7 LBS | RESPIRATION RATE: 16 BRPM | OXYGEN SATURATION: 97 % | DIASTOLIC BLOOD PRESSURE: 70 MMHG | HEART RATE: 107 BPM

## 2023-08-11 DIAGNOSIS — I10 ESSENTIAL HYPERTENSION: ICD-10-CM

## 2023-08-11 DIAGNOSIS — K51.00 PANCOLITIS: Primary | ICD-10-CM

## 2023-08-11 PROCEDURE — 1159F MED LIST DOCD IN RCRD: CPT | Performed by: FAMILY MEDICINE

## 2023-08-11 PROCEDURE — 3074F SYST BP LT 130 MM HG: CPT | Performed by: FAMILY MEDICINE

## 2023-08-11 PROCEDURE — 1160F RVW MEDS BY RX/DR IN RCRD: CPT | Performed by: FAMILY MEDICINE

## 2023-08-11 PROCEDURE — 3078F DIAST BP <80 MM HG: CPT | Performed by: FAMILY MEDICINE

## 2023-08-11 PROCEDURE — 99214 OFFICE O/P EST MOD 30 MIN: CPT | Performed by: FAMILY MEDICINE

## 2023-08-11 NOTE — PROGRESS NOTES
"Transitional Care Follow Up Visit  Subjective     Marcia Ulloa is a 78 y.o. female who presents for a transitional care management visit.    Within 48 business hours after discharge our office contacted her via telephone to coordinate her care and needs.      I reviewed and discussed the details of that call along with the discharge summary, hospital problems, inpatient lab results, inpatient diagnostic studies, and consultation reports with Marcia.     Current outpatient and discharge medications have been reconciled for the patient.  Reviewed by: Bk Mendez MD          7/15/2023    11:02 AM   Date of TCM Phone Call   [facility]   Date of Admission 7/12/2023   Date of Discharge 7/15/2023   Discharge Disposition Home or Self Care     Risk for Readmission (LACE)   No data recorded    Hypertension    Female  Problem     Course During Hospital Stay:        \"This is a 78-year-old female who presented to the emergency room with complaints of left lower quadrant pain. She had a recent antibiotic exposure. She tested positive for C. difficile and was found to have pancolitis on CT. Please see H&P for full details of admission. She was started on oral vancomycin. Infectious disease was consulted who recommended she maintain this for a total of 10 days. Her abdominal pain is improving. Leukocytosis is also improving and she is afebrile. Over the past 24 hours she has had 2 loose bowel movements. She has noticed some blood in her stool but hemoglobin is remained stable. She will be discharged home today to continue the full course of treatment for the C. difficile with oral vancomycin. Infectious disease recommends that she receive judicious use of antibiotics moving forward and should she require antibiotics in the future they recommend she simultaneously be placed on a 10-day course of oral vancomycin to prevent recurrence of C. difficile. At this point she is medically stable and be " "discharged back home.\"    Patient states at today's office visit her bowel movements have improved significantly.  Is currently taking a probiotic.  She states that she has not needed to use Imodium over the last few days.  She has not seen a gastroenterologist, I did discuss with her that there is something that we should get set up for.  States that she currently does not have any pain.    For essential hypertension.  She is currently amlodipine 5 mg daily.  She denies any side effects of the medication.  Blood pressure today is 118/70.     The following portions of the patient's history were reviewed and updated as appropriate: allergies, current medications, past family history, past medical history, past social history, past surgical history, and problem list.    Review of Systems    Objective   Physical Exam  Vitals and nursing note reviewed.   Constitutional:       Appearance: She is well-developed.   HENT:      Head: Normocephalic and atraumatic.   Musculoskeletal:      Cervical back: Normal range of motion and neck supple.   Neurological:      Mental Status: She is alert and oriented to person, place, and time.   Psychiatric:         Behavior: Behavior normal.       Assessment & Plan   Diagnoses and all orders for this visit:    1. Pancolitis (Primary)  -     Ambulatory Referral to Gastroenterology  -     CBC & Differential  -     Comprehensive Metabolic Panel  -     Urinalysis With Microscopic - Urine, Clean Catch  -     Amylase  -     Lipase    2. Essential hypertension    Essential hypertension, will continue on amlodipine.  For the pancolitis, will refer to GI.  We will order several labs at today's visit.               "

## 2023-08-12 LAB
ALBUMIN SERPL-MCNC: 4 G/DL (ref 3.8–4.8)
ALBUMIN/GLOB SERPL: 1.5 {RATIO} (ref 1.2–2.2)
ALP SERPL-CCNC: 76 IU/L (ref 44–121)
ALT SERPL-CCNC: 11 IU/L (ref 0–32)
AMYLASE SERPL-CCNC: 131 U/L (ref 31–110)
APPEARANCE UR: ABNORMAL
AST SERPL-CCNC: 16 IU/L (ref 0–40)
BACTERIA #/AREA URNS HPF: ABNORMAL /[HPF]
BASOPHILS # BLD AUTO: 0.1 X10E3/UL (ref 0–0.2)
BASOPHILS NFR BLD AUTO: 1 %
BILIRUB SERPL-MCNC: 0.3 MG/DL (ref 0–1.2)
BILIRUB UR QL STRIP: NEGATIVE
BUN SERPL-MCNC: 13 MG/DL (ref 8–27)
BUN/CREAT SERPL: 12 (ref 12–28)
CALCIUM SERPL-MCNC: 9.4 MG/DL (ref 8.7–10.3)
CASTS URNS QL MICRO: ABNORMAL /LPF
CHLORIDE SERPL-SCNC: 101 MMOL/L (ref 96–106)
CO2 SERPL-SCNC: 25 MMOL/L (ref 20–29)
COLOR UR: YELLOW
CREAT SERPL-MCNC: 1.13 MG/DL (ref 0.57–1)
EGFRCR SERPLBLD CKD-EPI 2021: 50 ML/MIN/1.73
EOSINOPHIL # BLD AUTO: 0.3 X10E3/UL (ref 0–0.4)
EOSINOPHIL NFR BLD AUTO: 2 %
EPI CELLS #/AREA URNS HPF: ABNORMAL /HPF (ref 0–10)
ERYTHROCYTE [DISTWIDTH] IN BLOOD BY AUTOMATED COUNT: 12.9 % (ref 11.7–15.4)
GLOBULIN SER CALC-MCNC: 2.6 G/DL (ref 1.5–4.5)
GLUCOSE SERPL-MCNC: 124 MG/DL (ref 70–99)
GLUCOSE UR QL STRIP: NEGATIVE
HCT VFR BLD AUTO: 37.1 % (ref 34–46.6)
HGB BLD-MCNC: 11.8 G/DL (ref 11.1–15.9)
HGB UR QL STRIP: NEGATIVE
IMM GRANULOCYTES # BLD AUTO: 0 X10E3/UL (ref 0–0.1)
IMM GRANULOCYTES NFR BLD AUTO: 0 %
KETONES UR QL STRIP: NEGATIVE
LEUKOCYTE ESTERASE UR QL STRIP: ABNORMAL
LIPASE SERPL-CCNC: 38 U/L (ref 14–85)
LYMPHOCYTES # BLD AUTO: 1.5 X10E3/UL (ref 0.7–3.1)
LYMPHOCYTES NFR BLD AUTO: 13 %
MCH RBC QN AUTO: 29.6 PG (ref 26.6–33)
MCHC RBC AUTO-ENTMCNC: 31.8 G/DL (ref 31.5–35.7)
MCV RBC AUTO: 93 FL (ref 79–97)
MICRO URNS: ABNORMAL
MONOCYTES # BLD AUTO: 1 X10E3/UL (ref 0.1–0.9)
MONOCYTES NFR BLD AUTO: 9 %
NEUTROPHILS # BLD AUTO: 8.6 X10E3/UL (ref 1.4–7)
NEUTROPHILS NFR BLD AUTO: 75 %
NITRITE UR QL STRIP: POSITIVE
PH UR STRIP: 6.5 [PH] (ref 5–7.5)
PLATELET # BLD AUTO: 399 X10E3/UL (ref 150–450)
POTASSIUM SERPL-SCNC: 4.2 MMOL/L (ref 3.5–5.2)
PROT SERPL-MCNC: 6.6 G/DL (ref 6–8.5)
PROT UR QL STRIP: ABNORMAL
RBC # BLD AUTO: 3.98 X10E6/UL (ref 3.77–5.28)
RBC #/AREA URNS HPF: ABNORMAL /HPF (ref 0–2)
SODIUM SERPL-SCNC: 141 MMOL/L (ref 134–144)
SP GR UR STRIP: 1.01 (ref 1–1.03)
UROBILINOGEN UR STRIP-MCNC: 0.2 MG/DL (ref 0.2–1)
WBC # BLD AUTO: 11.5 X10E3/UL (ref 3.4–10.8)
WBC #/AREA URNS HPF: >30 /HPF (ref 0–5)

## 2023-08-15 ENCOUNTER — HOSPITAL ENCOUNTER (OUTPATIENT)
Dept: MAMMOGRAPHY | Facility: HOSPITAL | Age: 78
Discharge: HOME | End: 2023-08-15
Admitting: FAMILY MEDICINE
Payer: MEDICARE

## 2023-08-15 DIAGNOSIS — Z12.31 VISIT FOR SCREENING MAMMOGRAM: ICD-10-CM

## 2023-08-15 PROCEDURE — 77067 SCR MAMMO BI INCL CAD: CPT

## 2023-08-15 PROCEDURE — 77063 BREAST TOMOSYNTHESIS BI: CPT

## 2023-08-19 NOTE — PROGRESS NOTES
IMPRESSION:  There are no findings suspicious for malignancy in the left  breast or right postmastectomy bed. Clinical follow-up and otherwise  routine follow-up mammography is recommended.    BI-RADS CATEGORY 2: Benign.

## 2023-08-23 ENCOUNTER — PREP FOR SURGERY (OUTPATIENT)
Dept: SURGERY | Facility: SURGERY CENTER | Age: 78
End: 2023-08-23
Payer: MEDICARE

## 2023-08-23 ENCOUNTER — OFFICE VISIT (OUTPATIENT)
Dept: GASTROENTEROLOGY | Facility: CLINIC | Age: 78
End: 2023-08-23
Payer: MEDICARE

## 2023-08-23 VITALS
DIASTOLIC BLOOD PRESSURE: 72 MMHG | TEMPERATURE: 97 F | WEIGHT: 146.5 LBS | HEART RATE: 100 BPM | HEIGHT: 66 IN | BODY MASS INDEX: 23.54 KG/M2 | SYSTOLIC BLOOD PRESSURE: 134 MMHG | OXYGEN SATURATION: 97 %

## 2023-08-23 DIAGNOSIS — K62.5 RECTAL BLEEDING: ICD-10-CM

## 2023-08-23 DIAGNOSIS — A04.72 C. DIFFICILE COLITIS: ICD-10-CM

## 2023-08-23 DIAGNOSIS — K52.9 COLITIS: Primary | ICD-10-CM

## 2023-08-23 PROCEDURE — 1159F MED LIST DOCD IN RCRD: CPT | Performed by: NURSE PRACTITIONER

## 2023-08-23 PROCEDURE — 1160F RVW MEDS BY RX/DR IN RCRD: CPT | Performed by: NURSE PRACTITIONER

## 2023-08-23 PROCEDURE — 3078F DIAST BP <80 MM HG: CPT | Performed by: NURSE PRACTITIONER

## 2023-08-23 PROCEDURE — 3075F SYST BP GE 130 - 139MM HG: CPT | Performed by: NURSE PRACTITIONER

## 2023-08-23 PROCEDURE — 99203 OFFICE O/P NEW LOW 30 MIN: CPT | Performed by: NURSE PRACTITIONER

## 2023-08-23 RX ORDER — SODIUM CHLORIDE 0.9 % (FLUSH) 0.9 %
3 SYRINGE (ML) INJECTION EVERY 12 HOURS SCHEDULED
OUTPATIENT
Start: 2023-08-23

## 2023-08-23 RX ORDER — SODIUM CHLORIDE 0.9 % (FLUSH) 0.9 %
10 SYRINGE (ML) INJECTION AS NEEDED
OUTPATIENT
Start: 2023-08-23

## 2023-08-23 RX ORDER — SODIUM CHLORIDE, SODIUM LACTATE, POTASSIUM CHLORIDE, CALCIUM CHLORIDE 600; 310; 30; 20 MG/100ML; MG/100ML; MG/100ML; MG/100ML
30 INJECTION, SOLUTION INTRAVENOUS CONTINUOUS PRN
OUTPATIENT
Start: 2023-08-23

## 2023-08-23 NOTE — PROGRESS NOTES
"Chief Complaint   Patient presents with    GI Problem         History of Present Illness  Patient is a 78-year-old female who presents today for Evaluation.  She was referred for pancolitis.  She was hospitalized in July following left lower quadrant abdominal pain.  She was found to have C. difficile infection and pancolitis on CT.  She was treated with oral vancomycin.  Her last colonoscopy was done in 2014 was normal.    Patient presents today for follow-up.  She completed vancomycin and reports her symptoms resolved.  Prior to treatment with this she was having abdominal pain and rectal bleeding.    At this time she is feeling back to her usual state of good health.  She denies any abdominal pain.  She has had no further blood in the stool.  She denies any diarrhea.  Denies any upper GI complaints.    She is taking a daily probiotic which she has found helpful.     Result Review :       CBC & Differential (08/11/2023 14:13)    Comprehensive Metabolic Panel (08/11/2023 14:13)    CT Abdomen Pelvis With Contrast (07/12/2023 12:31)    SCANNED - COLONOSCOPY (08/12/2014)    Office Visit with Bk Mendez MD (08/11/2023)    Referral to Gastroenterology for Pancolitis (08/11/2023)    Vital Signs:   /72   Pulse 100   Temp 97 øF (36.1 øC)   Ht 167.6 cm (66\")   Wt 66.5 kg (146 lb 8 oz)   SpO2 97%   BMI 23.65 kg/mý     Body mass index is 23.65 kg/mý.     Physical Exam  Vitals reviewed.   Constitutional:       General: She is not in acute distress.     Appearance: She is well-developed.   HENT:      Head: Normocephalic and atraumatic.   Pulmonary:      Effort: Pulmonary effort is normal. No respiratory distress.   Abdominal:      General: Abdomen is flat. Bowel sounds are normal. There is no distension.      Palpations: Abdomen is soft.      Tenderness: There is no abdominal tenderness.   Skin:     General: Skin is dry.      Coloration: Skin is not pale.   Neurological:      Mental Status: She is alert and " oriented to person, place, and time.   Psychiatric:         Thought Content: Thought content normal.         Assessment and Plan    Diagnoses and all orders for this visit:    1. Colitis (Primary)    2. C. difficile colitis    3. Rectal bleeding         Discussion  Patient presents today for follow-up after hospitalization for C. difficile colitis.  Her symptoms resolved after treatment with vancomycin.  No additional treatment needed at this time.  Did recommend colonoscopy to follow-up on this episode which we will arrange.  Recommended judicious use of antibiotics moving forward to prevent recurrence of C. difficile.  Encouraged her to continue daily probiotic.          Follow Up   Return if symptoms worsen or fail to improve.    Patient Instructions   Schedule colonoscopy for further evaluation.     Continue taking daily probiotic.

## 2023-08-31 ENCOUNTER — HOSPITAL ENCOUNTER (OUTPATIENT)
Dept: MRI IMAGING | Facility: HOSPITAL | Age: 78
Discharge: HOME | End: 2023-08-31
Admitting: STUDENT IN AN ORGANIZED HEALTH CARE EDUCATION/TRAINING PROGRAM
Payer: MEDICARE

## 2023-08-31 DIAGNOSIS — R41.9 COGNITIVE COMPLAINTS: ICD-10-CM

## 2023-08-31 PROCEDURE — 0 GADOBENATE DIMEGLUMINE 529 MG/ML SOLUTION: Performed by: STUDENT IN AN ORGANIZED HEALTH CARE EDUCATION/TRAINING PROGRAM

## 2023-08-31 PROCEDURE — 70553 MRI BRAIN STEM W/O & W/DYE: CPT

## 2023-08-31 PROCEDURE — A9577 INJ MULTIHANCE: HCPCS | Performed by: STUDENT IN AN ORGANIZED HEALTH CARE EDUCATION/TRAINING PROGRAM

## 2023-08-31 RX ADMIN — GADOBENATE DIMEGLUMINE 13 ML: 529 INJECTION, SOLUTION INTRAVENOUS at 13:32

## 2023-09-06 ENCOUNTER — OFFICE VISIT (OUTPATIENT)
Dept: INTERNAL MEDICINE | Facility: CLINIC | Age: 78
End: 2023-09-06
Payer: MEDICARE

## 2023-09-06 VITALS
OXYGEN SATURATION: 96 % | SYSTOLIC BLOOD PRESSURE: 130 MMHG | DIASTOLIC BLOOD PRESSURE: 68 MMHG | WEIGHT: 142 LBS | RESPIRATION RATE: 18 BRPM | BODY MASS INDEX: 22.82 KG/M2 | HEART RATE: 64 BPM | HEIGHT: 66 IN

## 2023-09-06 DIAGNOSIS — R32 URINARY INCONTINENCE, UNSPECIFIED TYPE: ICD-10-CM

## 2023-09-06 DIAGNOSIS — I10 ESSENTIAL HYPERTENSION: Primary | ICD-10-CM

## 2023-09-06 DIAGNOSIS — E78.49 OTHER HYPERLIPIDEMIA: ICD-10-CM

## 2023-09-06 PROCEDURE — 1159F MED LIST DOCD IN RCRD: CPT | Performed by: FAMILY MEDICINE

## 2023-09-06 PROCEDURE — 99214 OFFICE O/P EST MOD 30 MIN: CPT | Performed by: FAMILY MEDICINE

## 2023-09-06 PROCEDURE — 3078F DIAST BP <80 MM HG: CPT | Performed by: FAMILY MEDICINE

## 2023-09-06 PROCEDURE — 1160F RVW MEDS BY RX/DR IN RCRD: CPT | Performed by: FAMILY MEDICINE

## 2023-09-06 PROCEDURE — 3075F SYST BP GE 130 - 139MM HG: CPT | Performed by: FAMILY MEDICINE

## 2023-09-13 ENCOUNTER — OFFICE VISIT (OUTPATIENT)
Dept: NEUROLOGY | Facility: CLINIC | Age: 78
End: 2023-09-13
Payer: MEDICARE

## 2023-09-13 VITALS
WEIGHT: 144.6 LBS | BODY MASS INDEX: 23.24 KG/M2 | SYSTOLIC BLOOD PRESSURE: 128 MMHG | HEIGHT: 66 IN | OXYGEN SATURATION: 98 % | HEART RATE: 82 BPM | DIASTOLIC BLOOD PRESSURE: 78 MMHG

## 2023-09-13 DIAGNOSIS — R41.9 COGNITIVE COMPLAINTS: Primary | ICD-10-CM

## 2023-09-13 DIAGNOSIS — R93.89 ABNORMAL MRI: ICD-10-CM

## 2023-09-13 PROCEDURE — 1159F MED LIST DOCD IN RCRD: CPT | Performed by: STUDENT IN AN ORGANIZED HEALTH CARE EDUCATION/TRAINING PROGRAM

## 2023-09-13 PROCEDURE — 3078F DIAST BP <80 MM HG: CPT | Performed by: STUDENT IN AN ORGANIZED HEALTH CARE EDUCATION/TRAINING PROGRAM

## 2023-09-13 PROCEDURE — 99215 OFFICE O/P EST HI 40 MIN: CPT | Performed by: STUDENT IN AN ORGANIZED HEALTH CARE EDUCATION/TRAINING PROGRAM

## 2023-09-13 PROCEDURE — 3074F SYST BP LT 130 MM HG: CPT | Performed by: STUDENT IN AN ORGANIZED HEALTH CARE EDUCATION/TRAINING PROGRAM

## 2023-09-13 PROCEDURE — 1160F RVW MEDS BY RX/DR IN RCRD: CPT | Performed by: STUDENT IN AN ORGANIZED HEALTH CARE EDUCATION/TRAINING PROGRAM

## 2023-09-13 NOTE — LETTER
September 13, 2023     Bk Mendez MD  [address] [facility]  [address]  [facility] [address]    Patient: Marcia Ulloa   YOB: 1945   Date of Visit: 9/13/2023     Dear Bk Mendez MD:       Thank you for referring Marcia Ulloa to me for evaluation. Below are the relevant portions of my assessment and plan of care.    If you have questions, please do not hesitate to call me. I look forward to following Marcia along with you.         Sincerely,        Carlos Stokes MD        CC: No Recipients

## 2023-09-13 NOTE — PROGRESS NOTES
Chief Complaint   Patient presents with    Memory Loss       Patient ID: Marcia Ulloa is a 78 y.o. female.    HPI:    The following portions of the patient's history were reviewed and updated as appropriate: allergies, current medications, past family history, past medical history, past social history, past surgical history and problem list.    Interval history:    Ms. Marcia Ulloa presents for follow-up. She was initially scheduled for an appointment later on, but her primary care wanted an earlier appointment to both review imaging. She is accompanied by Ronaldo.    The patient reports that her memory is about the same. They deny noticing a significant decline over the last couple of weeks. Today, the patient reports that she is doing well.    She is a retired , and she has a bachelor's degree.      Review of Systems   Neurological:  Negative for dizziness, tremors, seizures, syncope, facial asymmetry, speech difficulty, weakness, light-headedness, numbness and headaches.   Psychiatric/Behavioral:  Positive for confusion. Negative for agitation, behavioral problems, decreased concentration, dysphoric mood, hallucinations, self-injury, sleep disturbance and suicidal ideas. The patient is not nervous/anxious and is not hyperactive.          Vitals:    09/13/23 1130   BP: 128/78   Pulse: 82   SpO2: 98%       Neurologic Exam     Mental Status   SLUMS 22/30 -incorrectly adds up sum of money.  Names 13 animals.  Had 3 out of 5 delayed recall.  Left 1 number off the clock face.  Incorrectly names occupation of the person in the story - says she was an     SLUMS July 2023 27/30. 3/5 delayed recall. Names 10 animals, some related speech during this with some nonchalance to minute timer     Physical Exam    Procedures    Assessment/Plan:         Diagnoses and all orders for this visit:    1. Cognitive complaints (Primary)  -     Ambulatory Referral to Neurology    2. Abnormal MRI  -     Ambulatory  Referral to Neurology         Imaging results were discussed with the patient in detail. Possible treatment options were discussed. Risks and benefits were also discussed. She will be referred to another neurologist for further evaluation for memory treatments. 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       Return in about 8 months (around 5/13/2024).        Transcribed from ambient dictation for Carlos Stokes MD by Hoda Daniels.  09/13/23   15:25 EDT    Patient or patient representative verbalized consent to the visit recording.  I have personally performed the services described in this document as transcribed by the above individual, and it is both accurate and complete.  Carlos Stokes MD  10/2/2023  06:32 EDT  I spent 44 minutes caring for this patient on this date of service. This time includes time spent by me in the following activities as necessary: preparing for the visit, reviewing tests, medical records and previous visits, obtaining and/or reviewing a separately obtained history, performing a medically appropriate exam and/or evaluation, counseling and educating the patient, and/or communicating with other healthcare professionals, documenting information in the medical record, independently interpreting results and communicating that information with the patient, and developing a medically appropriate treatment plan with consideration of other conditions, medications, and treatments.

## 2023-09-20 ENCOUNTER — DOCUMENTATION (OUTPATIENT)
Dept: NEUROLOGY | Facility: CLINIC | Age: 78
End: 2023-09-20
Payer: MEDICARE

## 2023-09-20 ENCOUNTER — TELEPHONE (OUTPATIENT)
Dept: NEUROLOGY | Facility: CLINIC | Age: 78
End: 2023-09-20
Payer: MEDICARE

## 2023-09-22 ENCOUNTER — TELEPHONE (OUTPATIENT)
Dept: GASTROENTEROLOGY | Facility: CLINIC | Age: 78
End: 2023-09-22
Payer: MEDICARE

## 2023-09-22 NOTE — TELEPHONE ENCOUNTER
Caller: Marcia Ulloa    Relationship: Self    Best call back number: [phone]    What is the best time to reach you: ANYTIME    Who are you requesting to speak with (clinical staff, provider,  specific staff member): CLINICAL    What was the call regarding: PT IS NEEDING TO CANCEL PROCEDURE ON 9/29/2023. PT STATED HER BOWELS ARE FEELING GREAT.    Is it okay if the provider responds through [facility]: YES

## 2023-09-24 NOTE — PROGRESS NOTES
"Chief Complaint  Results    Subjective        Marcia Ulloa presents to [facility]  History of Present Illness    Patient presents at today's office visit with a past medical history having essential hypertension.  Patient is current taking amlodipine 2.5 mg daily.  Patient denies any side effects of the medicine.    Patient does have hyperlipidemia.  She is currently Crestor 5 mg daily.  She denies any side effects of the medicine.    Patient does have some urinary incontinence.  Patient states that she would like to go see urogyn.  However her urogyn is very booked out for quite some time.  We did discuss to see if we can get her into another specialist.    Objective   Vital Signs:  /68   Pulse 64   Resp 18   Ht 167.6 cm (66\")   Wt 64.4 kg (142 lb)   SpO2 96%   BMI 22.92 kg/m²   Estimated body mass index is 22.92 kg/m² as calculated from the following:    Height as of this encounter: 167.6 cm (66\").    Weight as of this encounter: 64.4 kg (142 lb).       BMI is within normal parameters. No other follow-up for BMI required.      Physical Exam   Result Review :    Common labs          7/14/2023    07:53 7/15/2023    06:48 8/11/2023    14:13   Common Labs   Glucose 105  101  124    BUN 10  12  13    Creatinine 0.86  0.89  1.13    Sodium 139  140  141    Potassium 3.8  3.9  4.2    Chloride 108  108  101    Calcium 8.4  8.5  9.4    Total Protein   6.6    Albumin   4.0    Total Bilirubin   0.3    Alkaline Phosphatase   76    AST (SGOT)   16    ALT (SGPT)   11    WBC 18.70  15.98  11.5    Hemoglobin 12.7  12.4  11.8    Hematocrit 37.2  36.5  37.1    Platelets 213  233  399                   Assessment and Plan   Diagnoses and all orders for this visit:    1. Essential hypertension (Primary)  -     Comprehensive Metabolic Panel; Future  -     CBC & Differential; Future    2. Other hyperlipidemia  -     Comprehensive Metabolic Panel; Future  -     CBC & Differential; Future  -    "  Lipid Panel With LDL / HDL Ratio; Future    3. Urinary incontinence, unspecified type  -     Ambulatory Referral to Gynecology    For the urinary incontinence, she does need to see urogyn.  For the hyperlipidemia, continue Crestor.  For the essential hypertension, continue amlodipine.         Follow Up   No follow-ups on file.  Patient was given instructions and counseling regarding her condition or for health maintenance advice. Please see specific information pulled into the AVS if appropriate.

## 2023-10-20 DIAGNOSIS — R06.2 WHEEZING: ICD-10-CM

## 2023-10-20 RX ORDER — ALBUTEROL SULFATE 90 UG/1
2 AEROSOL, METERED RESPIRATORY (INHALATION) EVERY 4 HOURS PRN
Qty: 18 G | Refills: 2 | Status: SHIPPED | OUTPATIENT
Start: 2023-10-20

## 2023-11-20 ENCOUNTER — TELEPHONE (OUTPATIENT)
Dept: INTERNAL MEDICINE | Facility: CLINIC | Age: 78
End: 2023-11-20

## 2023-11-20 ENCOUNTER — TELEPHONE (OUTPATIENT)
Dept: INTERNAL MEDICINE | Facility: CLINIC | Age: 78
End: 2023-11-20
Payer: MEDICARE

## 2023-11-20 NOTE — TELEPHONE ENCOUNTER
Caller: Marcia Ulloa    Relationship: Self    Best call back number: [phone]    What was the call regarding: PATIENT STATED SHE DOES NOT NEED THE albuterol sulfate  (90 Base) MCG/ACT inhaler  ANYMORE.    SHE STATED SHE HAS NOT HAD TO USE IT AND HAS PLENTY IF NEEDED.

## 2023-11-20 NOTE — TELEPHONE ENCOUNTER
Caller: Marcia Ulloa    Relationship: Self    Best call back number: [phone]    What was the call regarding: PATIENT WOULD LIKE TO SWITCH PHARMACIES AND HAVE ALL MEDICATIONS SENT TO [facility] [address] [place] [address] [address] - [phone]  - [phone]  .    SHE STATED SHE DOES NOT NEED ANY CURRENT REFILLS.

## 2023-12-12 ENCOUNTER — PRE-ADMISSION TESTING (OUTPATIENT)
Dept: PREADMISSION TESTING | Facility: HOSPITAL | Age: 78
End: 2023-12-12
Payer: MEDICARE

## 2023-12-12 VITALS
TEMPERATURE: 98.4 F | SYSTOLIC BLOOD PRESSURE: 151 MMHG | RESPIRATION RATE: 18 BRPM | DIASTOLIC BLOOD PRESSURE: 85 MMHG | HEART RATE: 89 BPM | HEIGHT: 65 IN | WEIGHT: 147.3 LBS | OXYGEN SATURATION: 98 % | BODY MASS INDEX: 24.54 KG/M2

## 2023-12-12 LAB
ANION GAP SERPL CALCULATED.3IONS-SCNC: 9.5 MMOL/L (ref 5–15)
BUN SERPL-MCNC: 16 MG/DL (ref 8–23)
BUN/CREAT SERPL: 16.8 (ref 7–25)
CALCIUM SPEC-SCNC: 9.9 MG/DL (ref 8.6–10.5)
CHLORIDE SERPL-SCNC: 105 MMOL/L (ref 98–107)
CO2 SERPL-SCNC: 26.5 MMOL/L (ref 22–29)
CREAT SERPL-MCNC: 0.95 MG/DL (ref 0.57–1)
DEPRECATED RDW RBC AUTO: 46.3 FL (ref 37–54)
EGFRCR SERPLBLD CKD-EPI 2021: 61.8 ML/MIN/1.73
ERYTHROCYTE [DISTWIDTH] IN BLOOD BY AUTOMATED COUNT: 14 % (ref 12.3–15.4)
GLUCOSE SERPL-MCNC: 79 MG/DL (ref 65–99)
HCT VFR BLD AUTO: 38.2 % (ref 34–46.6)
HGB BLD-MCNC: 12.6 G/DL (ref 12–15.9)
MCH RBC QN AUTO: 30.1 PG (ref 26.6–33)
MCHC RBC AUTO-ENTMCNC: 33 G/DL (ref 31.5–35.7)
MCV RBC AUTO: 91.2 FL (ref 79–97)
PLATELET # BLD AUTO: 279 10*3/MM3 (ref 140–450)
PMV BLD AUTO: 10.6 FL (ref 6–12)
POTASSIUM SERPL-SCNC: 4 MMOL/L (ref 3.5–5.2)
RBC # BLD AUTO: 4.19 10*6/MM3 (ref 3.77–5.28)
SODIUM SERPL-SCNC: 141 MMOL/L (ref 136–145)
WBC NRBC COR # BLD AUTO: 9.4 10*3/MM3 (ref 3.4–10.8)

## 2023-12-12 PROCEDURE — 80048 BASIC METABOLIC PNL TOTAL CA: CPT

## 2023-12-12 PROCEDURE — 85027 COMPLETE CBC AUTOMATED: CPT

## 2023-12-12 PROCEDURE — 36415 COLL VENOUS BLD VENIPUNCTURE: CPT

## 2023-12-12 PROCEDURE — 93005 ELECTROCARDIOGRAM TRACING: CPT

## 2023-12-12 RX ORDER — DONEPEZIL HYDROCHLORIDE 5 MG/1
5 TABLET, FILM COATED ORAL NIGHTLY
COMMUNITY

## 2023-12-12 RX ORDER — ACETAMINOPHEN 325 MG/1
1 TABLET ORAL EVERY 6 HOURS PRN
COMMUNITY

## 2023-12-12 NOTE — DISCHARGE INSTRUCTIONS
Take the following medications the morning of surgery:  AMLODIPINE    BRING INHALER    ARRIVAL TIME 10:00 AM      UNLESS OTHERWISE INSTRUCTED PER DR GODOY OFFICE        If you are on prescription narcotic pain medication to control your pain you may also take that medication the morning of surgery.    General Instructions:  Do not eat solid food after midnight the night before surgery.  You may drink clear liquids day of surgery but must stop at least one hour before your hospital arrival time.  It is beneficial for you to have a clear drink that contains carbohydrates the day of surgery.  We suggest a 12 to 20 ounce bottle of Gatorade or Powerade for non-diabetic patients or a 12 to 20 ounce bottle of G2 or Powerade Zero for diabetic patients. (Pediatric patients, are not advised to drink a 12 to 20 ounce carbohydrate drink)    Clear liquids are liquids you can see through.  Nothing red in color.     Plain water                               Sports drinks  Sodas                                   Gelatin (Jell-O)  Fruit juices without pulp such as white grape juice and apple juice  Popsicles that contain no fruit or yogurt  Tea or coffee (no cream or milk added)  Gatorade / Powerade  G2 / Powerade Zero    Infants may have breast milk up to four hours before surgery.  Infants drinking formula may drink formula up to six hours before surgery.   Patients who avoid smoking, chewing tobacco and alcohol for 4 weeks prior to surgery have a reduced risk of post-operative complications.  Quit smoking as many days before surgery as you can.  Do not smoke, use chewing tobacco or drink alcohol the day of surgery.   If applicable bring your C-PAP/ BI-PAP machine in with you to preop day of surgery.  Bring any papers given to you in the doctor’s office.  Wear clean comfortable clothes.  Do not wear contact lenses, false eyelashes or make-up.  Bring a case for your glasses.   Bring crutches or walker if applicable.  Remove all  piercings.  Leave jewelry and any other valuables at home.  Hair extensions with metal clips must be removed prior to surgery.  The Pre-Admission Testing nurse will instruct you to bring medications if unable to obtain an accurate list in Pre-Admission Testing.        If you were given a blood bank ID arm band remember to bring it with you the day of surgery.    Preventing a Surgical Site Infection:  For 2 to 3 days before surgery, avoid shaving with a razor because the razor can irritate skin and make it easier to develop an infection.    Any areas of open skin can increase the risk of a post-operative wound infection by allowing bacteria to enter and travel throughout the body.  Notify your surgeon if you have any skin wounds / rashes even if it is not near the expected surgical site.  The area will need assessed to determine if surgery should be delayed until it is healed.  The night prior to surgery shower using a fresh bar of anti-bacterial soap (such as Dial) and clean washcloth.  Sleep in a clean bed with clean clothing.  Do not allow pets to sleep with you.  Shower on the morning of surgery using a fresh bar of anti-bacterial soap (such as Dial) and clean washcloth.  Dry with a clean towel and dress in clean clothing.  Ask your surgeon if you will be receiving antibiotics prior to surgery.  Make sure you, your family, and all healthcare providers clean their hands with soap and water or an alcohol based hand  before caring for you or your wound.    Day of surgery: 1/3/2024  Your arrival time is approximately two hours before your scheduled surgery time.  Upon arrival, a Pre-op nurse and Anesthesiologist will review your health history, obtain vital signs, and answer questions you may have.  The only belongings needed at this time will be a list of your home medications and if applicable your C-PAP/BI-PAP machine.  A Pre-op nurse will start an IV and you may receive medication in preparation for  surgery, including something to help you relax.     Please be aware that surgery does come with discomfort.  We want to make every effort to control your discomfort so please discuss any uncontrolled symptoms with your nurse.   Your doctor will most likely have prescribed pain medications.      If you are going home after surgery you will receive individualized written care instructions before being discharged.  A responsible adult must drive you to and from the hospital on the day of your surgery and stay with you for 24 hours.  Discharge prescriptions can be filled by the hospital pharmacy during regular pharmacy hours.  If you are having surgery late in the day/evening your prescription may be e-prescribed to your pharmacy.  Please verify your pharmacy hours or chose a 24 hour pharmacy to avoid not having access to your prescription because your pharmacy has closed for the day.    If you are staying overnight following surgery, you will be transported to your hospital room following the recovery period.  [facility] has all private rooms.    If you have any questions please call Pre-Admission Testing at [phone].  Deductibles and co-payments are collected on the day of service. Please be prepared to pay the required co-pay, deductible or deposit on the day of service as defined by your plan.    Call your surgeon immediately if you experience any of the following symptoms:  Sore Throat  Shortness of Breath or difficulty breathing  Cough  Chills  Body soreness or muscle pain  Headache  Fever  New loss of taste or smell  Do not arrive for your surgery ill.  Your procedure will need to be rescheduled to another time.  You will need to call your physician before the day of surgery to avoid any unnecessary exposure to hospital staff as well as other patients.

## 2023-12-13 LAB
QT INTERVAL: 427 MS
QTC INTERVAL: 455 MS

## 2023-12-28 ENCOUNTER — ANESTHESIA EVENT (OUTPATIENT)
Dept: PERIOP | Facility: HOSPITAL | Age: 78
End: 2023-12-28
Payer: MEDICARE

## 2024-01-02 PROBLEM — N81.9 VAGINAL VAULT PROLAPSE: Status: ACTIVE | Noted: 2024-01-02

## 2024-01-02 PROBLEM — N81.5 VAGINAL ENTEROCELE: Status: ACTIVE | Noted: 2024-01-02

## 2024-01-02 PROBLEM — N81.6 RECTOCELE: Status: ACTIVE | Noted: 2024-01-02

## 2024-01-02 PROBLEM — N81.89 LOSS OF PERINEAL BODY, FEMALE: Status: ACTIVE | Noted: 2024-01-02

## 2024-01-02 NOTE — PLAN OF CARE
Laparoscopic uterosacral ligament colpopexy sacral colpopexy  Laparoscopic lysis of intestinal adhesions   Laparoscopic paravaginal repair  Laparoscopic abdominal enterocele repair  Posterior colporrhaphy  Anterior colporrhaphy  Extraperitoneal colpopexy sacrospinous ligament fixation  Insertion of vaginal grafts  Cystourethroscopy

## 2024-01-02 NOTE — H&P
Female Pelvic Medicine and Reconstructive Surgery   History & Physical    Patient Identification:  Name: Marcia Ulloa Age: 78 y.o. Sex: female :  1945 MRN: Female Pelvic Medicine and Reconstructive Surgery   History & Physical    Patient Identification:  Name: Marcia Ulloa Age: 78 y.o. Sex: female :  1945 MRN: [id number]                            Problem List:    Cystocele, midline    Cystocele, lateral    Loss of perineal body, female    Incompetence or weakening of rectovaginal tissue    Vaginal vault prolapse    Vaginal enterocele    Rectocele    Loss of perineal body, female    Past Medical History:  Past Medical History:   Diagnosis Date    Arthritis     Bowel incontinence     Breast cancer     Rt    Clotting disorder in hospital in July with colon bleeding    Colon polyp     Cystocele with rectocele     Drug therapy     femara 5-7 years.    Eczema     History of shingles     HL (hearing loss) somewhat    Hyperlipidemia     Hypertension     Irritable bowel syndrome     Knee pain     Prolapse of female bladder, acquired     Seasonal allergies     Slow to wake up after anesthesia     SOB (shortness of breath) on exertion     Urinary incontinence     Urinary tract infection      Past Surgical History:  Past Surgical History:   Procedure Laterality Date    ANTERIOR AND POSTERIOR VAGINAL REPAIR N/A 2020    Procedure: LAPAROSCOPIC/ABDOMINAL ENTEROCELE REPAIR;;  Surgeon: Senia Frederick MD;  Location: [facility];  Service: Gynecology    BREAST BIOPSY Right     malignant in 3 quadrents.    BREAST SURGERY  2010    mastectomy    COLONOSCOPY  can not remember    HYSTERECTOMY  2020    JOINT REPLACEMENT  2022    Wonderful surgery!    KNEE SURGERY      MASTECTOMY Right     PUBOVAGINAL SLING N/A 2020    Procedure: PUBOVAGINAL SLING; POSTERIOR COLPORRHAPHY; ANTERIOR COLPORRHAPHY; ENTRAPERITONEAL COLPOPEXY SACROSPINUS LIGAMENT FIXATION; INSERTION OF VAGINAL GRAFT;  CYSTOURETHROSCOPY;  Surgeon: Senia Frederick MD;  Location: [facility] OR;  Service: Gynecology    REDUCTION MAMMAPLASTY Left     TOTAL LAPAROSCOPIC HYSTERECTOMY, SACROCOLPOPEXY N/A 01/07/2020    Procedure: LAPAROSCOPIC UTEROSCRAL LIGAMENT SUSPENSION/SACRAL COLPOPEXY; LAPAROSCOPIC PARAVAGINAL REPAIR; TOTAL LAPAROSCPIC HYSTERECTOMY WITH BILATERAL SALPINGECTOMY;;  Surgeon: Senia Frederick MD;  Location: [facility] OR;  Service: Gynecology    TUBAL ABDOMINAL LIGATION        Home Meds:  No medications prior to admission.     Current Meds:   No current facility-administered medications for this encounter.    Current Outpatient Medications:     acetaminophen (TYLENOL) 325 MG tablet, Take 1 tablet by mouth Every 6 (Six) Hours As Needed., Disp: , Rfl:     albuterol sulfate  (90 Base) MCG/ACT inhaler, INHALE 2 PUFFS EVERY 4 HOURS AS NEEDED FOR WHEEZING OR SHORTNESS OF AIR, Disp: 18 g, Rfl: 2    amLODIPine (NORVASC) 5 MG tablet, Take 0.5 tablets by mouth Daily., Disp: 90 tablet, Rfl: 2    aspirin 81 MG EC tablet, Take 1 tablet by mouth Daily. INSTRUCTED PT TO FOLLOW MD INSTRUCTIONS REGARDING HOLDING FOR SURGERY, Disp: , Rfl:     Calcium Carbonate-Vitamin D (CALCIUM-CARB 600 + D PO), Take 1 tablet by mouth Daily. HOLD PRIOR TO SURG, Disp: , Rfl:     CRANBERRY PO, Take 1 tablet by mouth Daily. HOLD PRIOR TO SURG, Disp: , Rfl:     donepezil (ARICEPT) 5 MG tablet, Take 1 tablet by mouth Every Night., Disp: , Rfl:     melatonin 3 MG tablet, Take 1 tablet by mouth At Night As Needed for Sleep., Disp: , Rfl:     Multiple Vitamin (MULTI VITAMIN DAILY PO), Take 1 tablet by mouth Daily. HOLD PRIOR TO SURG, Disp: , Rfl:     Probiotic Product (PROBIOTIC PO), Take  by mouth. HOLD PRIOR TO SURG, Disp: , Rfl:     rosuvastatin (CRESTOR) 5 MG tablet, Take 1 tablet by mouth Daily., Disp: 90 tablet, Rfl: 3    TURMERIC PO, Take 1 tablet by mouth Daily. HOLD PRIOR TO SURG, Disp: , Rfl:   Allergies:  Allergies   Allergen Reactions     Penicillins Unknown (See Comments)     UNSURE-AS A CHILD     Immunizations:  Immunization History   Administered Date(s) Administered    COVID-19 (PFIZER) BIVALENT 12+YRS 09/23/2022, 09/26/2023    COVID-19 (PFIZER) Purple Cap Monovalent 02/09/2021, 03/02/2021, 10/07/2021, 05/05/2022    Covid-19 (Pfizer) Gray Cap Monovalent 05/05/2022    Flu Vaccine Intradermal Quad 18-64YR 10/06/2013    FluMist 2-49yrs 10/01/2017    Fluad Quad 65+ 08/28/2020, 09/21/2022    Fluzone High Dose =>65 Years ([employer]axcare ONLY) 10/06/2016, 10/06/2017, 10/05/2018, 10/10/2019    Fluzone High-Dose 65+yrs 10/01/2021    Hepatitis A 04/12/2005, 10/13/2005    Influenza Quad Vaccine (Inpatient) 10/01/2016    Influenza TIV (IM) 10/01/2013    Pneumococcal Conjugate 13-Valent (PCV13) 05/01/2017    Pneumococcal Polysaccharide (PPSV23) 05/24/2018, 07/01/2018    Shingrix 12/01/2020, 04/17/2021    Td (TDVAX) 04/25/2003    Tdap 01/02/2019    Typhoid, Unspecified 05/12/2005, 06/05/2007     Social History:   Social History     Tobacco Use    Smoking status: Never    Smokeless tobacco: Never   Substance Use Topics    Alcohol use: Not Currently     Alcohol/week: 2.0 standard drinks of alcohol     Types: 1 Glasses of wine, 1 Drinks containing 0.5 oz of alcohol per week     Comment: [place]      Family History:  Family History   Problem Relation Age of Onset    COPD Mother     Heart failure Mother     Hypertension Mother     Heart disease Mother     Depression Mother     Vision loss Mother     Anxiety disorder Mother     Heart failure Father     Hypertension Father     Heart disease Father         early fifties    COPD Father         Heart condition in his early fifties    Vision loss Maternal Grandfather     Malig Hyperthermia Neg Hx     Breast cancer Neg Hx     Ovarian cancer Neg Hx     Colon cancer Neg Hx     Colon polyps Neg Hx     Crohn's disease Neg Hx     Irritable bowel syndrome Neg Hx     Ulcerative colitis Neg Hx         Review of Systems  Constitutional:   Denies fever or chills   Eyes:  Denies change in visual acuity   HEENT:  Denies nasal congestion or sore throat   Respiratory:  Denies cough or shortness of breath   Cardiovascular:  Denies chest pain or edema   GI:  Denies abdominal pain, nausea, vomiting, bloody stools or diarrhea   :  Denies dysuria   Musculoskeletal:  Denies back pain or joint pain   Integument:  Denies rash   Neurologic:  Denies headache, focal weakness or sensory changes   Endocrine:  Denies polyuria or polydipsia   Lymphatic:  Denies swollen glands   Psychiatric:  Denies depression or anxiety       Objective:  tMax 24 hrs: No data recorded.    Vitals Ranges:        Exam:  Vital Signs: There were no vitals taken for this visit.  Constitutional:  Well developed, well nourished, no acute distress, non-toxic appearance    GI:  Soft, nondistended, normal bowel sounds, nontender, no organomegaly, no mass, no rebound, no guarding   :  No costovertebral angle tenderness   Musculoskeletal:  No edema, no tenderness, no deformities. Back- no tenderness  Integument:  Well hydrated, no rash   Lymphatic:  No lymphadenopathy noted   Neurologic:  Alert & oriented x 3,  Pelvic:     POPQ      Assessment:    Cystocele, midline    Cystocele, lateral    Loss of perineal body, female    Incompetence or weakening of rectovaginal tissue    Vaginal vault prolapse    Vaginal enterocele    Rectocele    Loss of perineal body, female      Plan:  Laparoscopic uterosacral ligament colpopexy sacral colpopexy  Laparoscopic lysis of intestinal adhesions   Laparoscopic paravaginal repair  Laparoscopic abdominal enterocele repair  Posterior colporrhaphy  Anterior colporrhaphy  Extraperitoneal colpopexy sacrospinous ligament fixation  Insertion of vaginal grafts  Cystourethroscopy     Senia Frederick MD    12/12/2023

## 2024-01-03 ENCOUNTER — APPOINTMENT (OUTPATIENT)
Dept: GENERAL RADIOLOGY | Facility: HOSPITAL | Age: 79
End: 2024-01-03
Payer: MEDICARE

## 2024-01-03 ENCOUNTER — ANESTHESIA (OUTPATIENT)
Dept: PERIOP | Facility: HOSPITAL | Age: 79
End: 2024-01-03
Payer: MEDICARE

## 2024-01-03 ENCOUNTER — HOSPITAL ENCOUNTER (OUTPATIENT)
Facility: HOSPITAL | Age: 79
Setting detail: OBSERVATION
Discharge: HOME | End: 2024-01-06
Attending: OBSTETRICS & GYNECOLOGY | Admitting: OBSTETRICS & GYNECOLOGY
Payer: MEDICARE

## 2024-01-03 DIAGNOSIS — N81.9 VAGINAL VAULT PROLAPSE: ICD-10-CM

## 2024-01-03 DIAGNOSIS — N39.3 FEMALE STRESS INCONTINENCE: ICD-10-CM

## 2024-01-03 DIAGNOSIS — N81.9 PROLAPSE OF FEMALE PELVIC ORGANS: ICD-10-CM

## 2024-01-03 DIAGNOSIS — N81.2 UTEROVAGINAL PROLAPSE, INCOMPLETE: ICD-10-CM

## 2024-01-03 DIAGNOSIS — N81.12 CYSTOCELE, LATERAL: ICD-10-CM

## 2024-01-03 DIAGNOSIS — N81.11 CYSTOCELE, MIDLINE: ICD-10-CM

## 2024-01-03 DIAGNOSIS — N81.6 RECTOCELE: ICD-10-CM

## 2024-01-03 DIAGNOSIS — N81.89 LOSS OF PERINEAL BODY, FEMALE: Primary | ICD-10-CM

## 2024-01-03 DIAGNOSIS — N81.5 VAGINAL ENTEROCELE: ICD-10-CM

## 2024-01-03 DIAGNOSIS — N81.5 VAGINAL ENTEROCELE, CONGENITAL OR ACQUIRED: ICD-10-CM

## 2024-01-03 PROCEDURE — 25010000002 DEXAMETHASONE SODIUM PHOSPHATE 20 MG/5ML SOLUTION: Performed by: STUDENT IN AN ORGANIZED HEALTH CARE EDUCATION/TRAINING PROGRAM

## 2024-01-03 PROCEDURE — 63710000001 IBUPROFEN 400 MG TABLET: Performed by: OBSTETRICS & GYNECOLOGY

## 2024-01-03 PROCEDURE — 25810000003 SODIUM CHLORIDE 0.9 % SOLUTION: Performed by: OBSTETRICS & GYNECOLOGY

## 2024-01-03 PROCEDURE — A9270 NON-COVERED ITEM OR SERVICE: HCPCS | Performed by: OBSTETRICS & GYNECOLOGY

## 2024-01-03 PROCEDURE — 25010000002 FENTANYL CITRATE (PF) 50 MCG/ML SOLUTION: Performed by: STUDENT IN AN ORGANIZED HEALTH CARE EDUCATION/TRAINING PROGRAM

## 2024-01-03 PROCEDURE — 25010000002 GLYCOPYRROLATE 1 MG/5ML SOLUTION: Performed by: STUDENT IN AN ORGANIZED HEALTH CARE EDUCATION/TRAINING PROGRAM

## 2024-01-03 PROCEDURE — 86900 BLOOD TYPING SEROLOGIC ABO: CPT | Performed by: OBSTETRICS & GYNECOLOGY

## 2024-01-03 PROCEDURE — 25010000002 HYDROMORPHONE 1 MG/ML SOLUTION: Performed by: STUDENT IN AN ORGANIZED HEALTH CARE EDUCATION/TRAINING PROGRAM

## 2024-01-03 PROCEDURE — 25010000002 MAGNESIUM SULFATE PER 500 MG OF MAGNESIUM: Performed by: STUDENT IN AN ORGANIZED HEALTH CARE EDUCATION/TRAINING PROGRAM

## 2024-01-03 PROCEDURE — 88300 SURGICAL PATH GROSS: CPT | Performed by: OBSTETRICS & GYNECOLOGY

## 2024-01-03 PROCEDURE — 25810000003 SODIUM CHLORIDE PER 500 ML: Performed by: OBSTETRICS & GYNECOLOGY

## 2024-01-03 PROCEDURE — G0378 HOSPITAL OBSERVATION PER HR: HCPCS

## 2024-01-03 PROCEDURE — 25010000002 CLINDAMYCIN 300 MG/50ML SOLUTION: Performed by: STUDENT IN AN ORGANIZED HEALTH CARE EDUCATION/TRAINING PROGRAM

## 2024-01-03 PROCEDURE — 25010000002 CLINDAMYCIN 600 MG/50ML SOLUTION: Performed by: OBSTETRICS & GYNECOLOGY

## 2024-01-03 PROCEDURE — 94799 UNLISTED PULMONARY SVC/PX: CPT

## 2024-01-03 PROCEDURE — 63710000001 DONEPEZIL 5 MG TABLET: Performed by: OBSTETRICS & GYNECOLOGY

## 2024-01-03 PROCEDURE — 86901 BLOOD TYPING SEROLOGIC RH(D): CPT | Performed by: OBSTETRICS & GYNECOLOGY

## 2024-01-03 PROCEDURE — 25010000002 PROPOFOL 200 MG/20ML EMULSION: Performed by: STUDENT IN AN ORGANIZED HEALTH CARE EDUCATION/TRAINING PROGRAM

## 2024-01-03 PROCEDURE — 25010000002 LEVOFLOXACIN PER 250 MG: Performed by: OBSTETRICS & GYNECOLOGY

## 2024-01-03 PROCEDURE — 63710000001 PHENAZOPYRIDINE 200 MG TABLET: Performed by: OBSTETRICS & GYNECOLOGY

## 2024-01-03 PROCEDURE — 25010000002 SUGAMMADEX 200 MG/2ML SOLUTION: Performed by: STUDENT IN AN ORGANIZED HEALTH CARE EDUCATION/TRAINING PROGRAM

## 2024-01-03 PROCEDURE — 25810000003 LACTATED RINGERS PER 1000 ML: Performed by: ANESTHESIOLOGY

## 2024-01-03 PROCEDURE — 94640 AIRWAY INHALATION TREATMENT: CPT

## 2024-01-03 PROCEDURE — 86850 RBC ANTIBODY SCREEN: CPT | Performed by: OBSTETRICS & GYNECOLOGY

## 2024-01-03 PROCEDURE — 63710000001 DOCUSATE SODIUM 100 MG CAPSULE: Performed by: OBSTETRICS & GYNECOLOGY

## 2024-01-03 DEVICE — ARISTA AH ABSORBABLE HEMOSTATIC PARTICLES
Type: IMPLANTABLE DEVICE | Site: ABDOMEN | Status: FUNCTIONAL
Brand: ARISTA™ AH

## 2024-01-03 RX ORDER — PROMETHAZINE HYDROCHLORIDE 25 MG/1
25 TABLET ORAL ONCE AS NEEDED
Status: DISCONTINUED | OUTPATIENT
Start: 2024-01-03 | End: 2024-01-03 | Stop reason: HOSPADM

## 2024-01-03 RX ORDER — FENTANYL CITRATE 50 UG/ML
50 INJECTION, SOLUTION INTRAMUSCULAR; INTRAVENOUS ONCE AS NEEDED
Status: DISCONTINUED | OUTPATIENT
Start: 2024-01-03 | End: 2024-01-03 | Stop reason: HOSPADM

## 2024-01-03 RX ORDER — FENTANYL CITRATE 50 UG/ML
50 INJECTION, SOLUTION INTRAMUSCULAR; INTRAVENOUS
Status: DISCONTINUED | OUTPATIENT
Start: 2024-01-03 | End: 2024-01-03 | Stop reason: HOSPADM

## 2024-01-03 RX ORDER — LIDOCAINE HYDROCHLORIDE 10 MG/ML
0.5 INJECTION, SOLUTION INFILTRATION; PERINEURAL ONCE AS NEEDED
Status: DISCONTINUED | OUTPATIENT
Start: 2024-01-03 | End: 2024-01-03 | Stop reason: HOSPADM

## 2024-01-03 RX ORDER — HYDROMORPHONE HYDROCHLORIDE 1 MG/ML
0.5 INJECTION, SOLUTION INTRAMUSCULAR; INTRAVENOUS; SUBCUTANEOUS
Status: DISCONTINUED | OUTPATIENT
Start: 2024-01-03 | End: 2024-01-03 | Stop reason: HOSPADM

## 2024-01-03 RX ORDER — DOCUSATE SODIUM 100 MG/1
100 CAPSULE, LIQUID FILLED ORAL 2 TIMES DAILY
Status: DISCONTINUED | OUTPATIENT
Start: 2024-01-03 | End: 2024-01-06 | Stop reason: HOSPADM

## 2024-01-03 RX ORDER — CLINDAMYCIN PHOSPHATE 300 MG/50ML
INJECTION, SOLUTION INTRAVENOUS AS NEEDED
Status: DISCONTINUED | OUTPATIENT
Start: 2024-01-03 | End: 2024-01-03 | Stop reason: SURG

## 2024-01-03 RX ORDER — SODIUM CHLORIDE 0.9 % (FLUSH) 0.9 %
10 SYRINGE (ML) INJECTION EVERY 12 HOURS SCHEDULED
Status: DISCONTINUED | OUTPATIENT
Start: 2024-01-03 | End: 2024-01-03 | Stop reason: HOSPADM

## 2024-01-03 RX ORDER — PROMETHAZINE HYDROCHLORIDE 25 MG/1
25 SUPPOSITORY RECTAL ONCE AS NEEDED
Status: DISCONTINUED | OUTPATIENT
Start: 2024-01-03 | End: 2024-01-03 | Stop reason: HOSPADM

## 2024-01-03 RX ORDER — HYDRALAZINE HYDROCHLORIDE 20 MG/ML
5 INJECTION INTRAMUSCULAR; INTRAVENOUS
Status: DISCONTINUED | OUTPATIENT
Start: 2024-01-03 | End: 2024-01-03 | Stop reason: HOSPADM

## 2024-01-03 RX ORDER — SODIUM CHLORIDE 0.9 % (FLUSH) 0.9 %
3-10 SYRINGE (ML) INJECTION AS NEEDED
Status: DISCONTINUED | OUTPATIENT
Start: 2024-01-03 | End: 2024-01-03 | Stop reason: HOSPADM

## 2024-01-03 RX ORDER — SODIUM CHLORIDE 9 MG/ML
INJECTION, SOLUTION INTRAVENOUS AS NEEDED
Status: DISCONTINUED | OUTPATIENT
Start: 2024-01-03 | End: 2024-01-03 | Stop reason: HOSPADM

## 2024-01-03 RX ORDER — SODIUM CHLORIDE, SODIUM LACTATE, POTASSIUM CHLORIDE, CALCIUM CHLORIDE 600; 310; 30; 20 MG/100ML; MG/100ML; MG/100ML; MG/100ML
9 INJECTION, SOLUTION INTRAVENOUS CONTINUOUS
Status: DISCONTINUED | OUTPATIENT
Start: 2024-01-03 | End: 2024-01-06 | Stop reason: HOSPADM

## 2024-01-03 RX ORDER — UREA 10 %
3 LOTION (ML) TOPICAL NIGHTLY PRN
Status: DISCONTINUED | OUTPATIENT
Start: 2024-01-03 | End: 2024-01-06 | Stop reason: HOSPADM

## 2024-01-03 RX ORDER — NALOXONE HCL 0.4 MG/ML
0.2 VIAL (ML) INJECTION AS NEEDED
Status: DISCONTINUED | OUTPATIENT
Start: 2024-01-03 | End: 2024-01-03 | Stop reason: HOSPADM

## 2024-01-03 RX ORDER — LEVOFLOXACIN 5 MG/ML
500 INJECTION, SOLUTION INTRAVENOUS ONCE
Status: COMPLETED | OUTPATIENT
Start: 2024-01-03 | End: 2024-01-03

## 2024-01-03 RX ORDER — ONDANSETRON 2 MG/ML
4 INJECTION INTRAMUSCULAR; INTRAVENOUS ONCE AS NEEDED
Status: DISCONTINUED | OUTPATIENT
Start: 2024-01-03 | End: 2024-01-03 | Stop reason: HOSPADM

## 2024-01-03 RX ORDER — BUPIVACAINE HYDROCHLORIDE AND EPINEPHRINE 2.5; 5 MG/ML; UG/ML
INJECTION, SOLUTION EPIDURAL; INFILTRATION; INTRACAUDAL; PERINEURAL AS NEEDED
Status: DISCONTINUED | OUTPATIENT
Start: 2024-01-03 | End: 2024-01-03 | Stop reason: HOSPADM

## 2024-01-03 RX ORDER — MIDAZOLAM HYDROCHLORIDE 1 MG/ML
0.5 INJECTION INTRAMUSCULAR; INTRAVENOUS
Status: DISCONTINUED | OUTPATIENT
Start: 2024-01-03 | End: 2024-01-03 | Stop reason: HOSPADM

## 2024-01-03 RX ORDER — LIDOCAINE HYDROCHLORIDE 20 MG/ML
INJECTION, SOLUTION INFILTRATION; PERINEURAL AS NEEDED
Status: DISCONTINUED | OUTPATIENT
Start: 2024-01-03 | End: 2024-01-03 | Stop reason: SURG

## 2024-01-03 RX ORDER — FENTANYL CITRATE 50 UG/ML
INJECTION, SOLUTION INTRAMUSCULAR; INTRAVENOUS AS NEEDED
Status: DISCONTINUED | OUTPATIENT
Start: 2024-01-03 | End: 2024-01-03 | Stop reason: SURG

## 2024-01-03 RX ORDER — FAMOTIDINE 10 MG/ML
20 INJECTION, SOLUTION INTRAVENOUS ONCE
Status: DISCONTINUED | OUTPATIENT
Start: 2024-01-03 | End: 2024-01-03 | Stop reason: HOSPADM

## 2024-01-03 RX ORDER — ALBUTEROL SULFATE 2.5 MG/3ML
2.5 SOLUTION RESPIRATORY (INHALATION) EVERY 4 HOURS PRN
Status: DISCONTINUED | OUTPATIENT
Start: 2024-01-03 | End: 2024-01-06 | Stop reason: HOSPADM

## 2024-01-03 RX ORDER — SODIUM CHLORIDE 9 MG/ML
100 INJECTION, SOLUTION INTRAVENOUS CONTINUOUS
Status: DISCONTINUED | OUTPATIENT
Start: 2024-01-03 | End: 2024-01-06 | Stop reason: HOSPADM

## 2024-01-03 RX ORDER — PROPOFOL 10 MG/ML
INJECTION, EMULSION INTRAVENOUS AS NEEDED
Status: DISCONTINUED | OUTPATIENT
Start: 2024-01-03 | End: 2024-01-03 | Stop reason: SURG

## 2024-01-03 RX ORDER — GLYCOPYRROLATE 0.2 MG/ML
INJECTION INTRAMUSCULAR; INTRAVENOUS AS NEEDED
Status: DISCONTINUED | OUTPATIENT
Start: 2024-01-03 | End: 2024-01-03 | Stop reason: SURG

## 2024-01-03 RX ORDER — ROSUVASTATIN CALCIUM 5 MG/1
5 TABLET, COATED ORAL DAILY
Status: DISCONTINUED | OUTPATIENT
Start: 2024-01-04 | End: 2024-01-06 | Stop reason: HOSPADM

## 2024-01-03 RX ORDER — PROMETHAZINE HYDROCHLORIDE 12.5 MG/1
12.5 TABLET ORAL EVERY 6 HOURS PRN
Status: DISCONTINUED | OUTPATIENT
Start: 2024-01-03 | End: 2024-01-06 | Stop reason: HOSPADM

## 2024-01-03 RX ORDER — SODIUM CHLORIDE 0.9 % (FLUSH) 0.9 %
3 SYRINGE (ML) INJECTION EVERY 12 HOURS SCHEDULED
Status: DISCONTINUED | OUTPATIENT
Start: 2024-01-03 | End: 2024-01-03 | Stop reason: HOSPADM

## 2024-01-03 RX ORDER — CLINDAMYCIN PHOSPHATE 600 MG/50ML
600 INJECTION, SOLUTION INTRAVENOUS EVERY 8 HOURS
Status: COMPLETED | OUTPATIENT
Start: 2024-01-03 | End: 2024-01-04

## 2024-01-03 RX ORDER — AMLODIPINE BESYLATE 2.5 MG/1
2.5 TABLET ORAL DAILY
Status: DISCONTINUED | OUTPATIENT
Start: 2024-01-04 | End: 2024-01-06 | Stop reason: HOSPADM

## 2024-01-03 RX ORDER — ACETAMINOPHEN AND CODEINE PHOSPHATE 300; 30 MG/1; MG/1
1 TABLET ORAL EVERY 4 HOURS PRN
Status: DISCONTINUED | OUTPATIENT
Start: 2024-01-03 | End: 2024-01-06 | Stop reason: HOSPADM

## 2024-01-03 RX ORDER — HYDROCODONE BITARTRATE AND ACETAMINOPHEN 5; 325 MG/1; MG/1
1 TABLET ORAL ONCE AS NEEDED
Status: DISCONTINUED | OUTPATIENT
Start: 2024-01-03 | End: 2024-01-03 | Stop reason: HOSPADM

## 2024-01-03 RX ORDER — MAGNESIUM HYDROXIDE 1200 MG/15ML
LIQUID ORAL AS NEEDED
Status: DISCONTINUED | OUTPATIENT
Start: 2024-01-03 | End: 2024-01-03 | Stop reason: HOSPADM

## 2024-01-03 RX ORDER — KETAMINE HCL IN NACL, ISO-OSM 100MG/10ML
SYRINGE (ML) INJECTION AS NEEDED
Status: DISCONTINUED | OUTPATIENT
Start: 2024-01-03 | End: 2024-01-03 | Stop reason: SURG

## 2024-01-03 RX ORDER — ROCURONIUM BROMIDE 10 MG/ML
INJECTION, SOLUTION INTRAVENOUS AS NEEDED
Status: DISCONTINUED | OUTPATIENT
Start: 2024-01-03 | End: 2024-01-03 | Stop reason: SURG

## 2024-01-03 RX ORDER — DIPHENHYDRAMINE HYDROCHLORIDE 50 MG/ML
12.5 INJECTION INTRAMUSCULAR; INTRAVENOUS
Status: DISCONTINUED | OUTPATIENT
Start: 2024-01-03 | End: 2024-01-03 | Stop reason: HOSPADM

## 2024-01-03 RX ORDER — ENOXAPARIN SODIUM 100 MG/ML
40 INJECTION SUBCUTANEOUS DAILY
Status: DISCONTINUED | OUTPATIENT
Start: 2024-01-04 | End: 2024-01-06 | Stop reason: HOSPADM

## 2024-01-03 RX ORDER — SODIUM CHLORIDE 0.9 % (FLUSH) 0.9 %
10 SYRINGE (ML) INJECTION AS NEEDED
Status: DISCONTINUED | OUTPATIENT
Start: 2024-01-03 | End: 2024-01-03 | Stop reason: HOSPADM

## 2024-01-03 RX ORDER — ASPIRIN 81 MG/1
81 TABLET ORAL DAILY
Status: DISCONTINUED | OUTPATIENT
Start: 2024-01-04 | End: 2024-01-06 | Stop reason: HOSPADM

## 2024-01-03 RX ORDER — EPHEDRINE SULFATE 50 MG/ML
INJECTION INTRAVENOUS AS NEEDED
Status: DISCONTINUED | OUTPATIENT
Start: 2024-01-03 | End: 2024-01-03 | Stop reason: SURG

## 2024-01-03 RX ORDER — ONDANSETRON 2 MG/ML
4 INJECTION INTRAMUSCULAR; INTRAVENOUS EVERY 6 HOURS PRN
Status: DISCONTINUED | OUTPATIENT
Start: 2024-01-03 | End: 2024-01-06 | Stop reason: HOSPADM

## 2024-01-03 RX ORDER — DROPERIDOL 2.5 MG/ML
0.62 INJECTION, SOLUTION INTRAMUSCULAR; INTRAVENOUS
Status: DISCONTINUED | OUTPATIENT
Start: 2024-01-03 | End: 2024-01-03 | Stop reason: HOSPADM

## 2024-01-03 RX ORDER — IPRATROPIUM BROMIDE AND ALBUTEROL SULFATE 2.5; .5 MG/3ML; MG/3ML
3 SOLUTION RESPIRATORY (INHALATION) ONCE
Status: COMPLETED | OUTPATIENT
Start: 2024-01-03 | End: 2024-01-03

## 2024-01-03 RX ORDER — FLUMAZENIL 0.1 MG/ML
0.2 INJECTION INTRAVENOUS AS NEEDED
Status: DISCONTINUED | OUTPATIENT
Start: 2024-01-03 | End: 2024-01-03 | Stop reason: HOSPADM

## 2024-01-03 RX ORDER — IBUPROFEN 400 MG/1
400 TABLET ORAL
Status: DISCONTINUED | OUTPATIENT
Start: 2024-01-03 | End: 2024-01-06 | Stop reason: HOSPADM

## 2024-01-03 RX ORDER — MAGNESIUM SULFATE HEPTAHYDRATE 500 MG/ML
INJECTION, SOLUTION INTRAMUSCULAR; INTRAVENOUS AS NEEDED
Status: DISCONTINUED | OUTPATIENT
Start: 2024-01-03 | End: 2024-01-03 | Stop reason: SURG

## 2024-01-03 RX ORDER — ONDANSETRON 4 MG/1
4 TABLET, FILM COATED ORAL EVERY 6 HOURS PRN
Status: DISCONTINUED | OUTPATIENT
Start: 2024-01-03 | End: 2024-01-06 | Stop reason: HOSPADM

## 2024-01-03 RX ORDER — OXYCODONE AND ACETAMINOPHEN 7.5; 325 MG/1; MG/1
1 TABLET ORAL EVERY 4 HOURS PRN
Status: DISCONTINUED | OUTPATIENT
Start: 2024-01-03 | End: 2024-01-03 | Stop reason: HOSPADM

## 2024-01-03 RX ORDER — SODIUM CHLORIDE 9 MG/ML
40 INJECTION, SOLUTION INTRAVENOUS AS NEEDED
Status: DISCONTINUED | OUTPATIENT
Start: 2024-01-03 | End: 2024-01-03 | Stop reason: HOSPADM

## 2024-01-03 RX ORDER — CLINDAMYCIN PHOSPHATE 900 MG/50ML
900 INJECTION, SOLUTION INTRAVENOUS ONCE
Status: DISCONTINUED | OUTPATIENT
Start: 2024-01-03 | End: 2024-01-03 | Stop reason: HOSPADM

## 2024-01-03 RX ORDER — DEXAMETHASONE SODIUM PHOSPHATE 4 MG/ML
INJECTION, SOLUTION INTRA-ARTICULAR; INTRALESIONAL; INTRAMUSCULAR; INTRAVENOUS; SOFT TISSUE AS NEEDED
Status: DISCONTINUED | OUTPATIENT
Start: 2024-01-03 | End: 2024-01-03 | Stop reason: SURG

## 2024-01-03 RX ORDER — PROMETHAZINE HYDROCHLORIDE 12.5 MG/1
12.5 SUPPOSITORY RECTAL EVERY 6 HOURS PRN
Status: DISCONTINUED | OUTPATIENT
Start: 2024-01-03 | End: 2024-01-06 | Stop reason: HOSPADM

## 2024-01-03 RX ORDER — LABETALOL HYDROCHLORIDE 5 MG/ML
5 INJECTION, SOLUTION INTRAVENOUS
Status: DISCONTINUED | OUTPATIENT
Start: 2024-01-03 | End: 2024-01-03 | Stop reason: HOSPADM

## 2024-01-03 RX ORDER — PHENAZOPYRIDINE HYDROCHLORIDE 200 MG/1
200 TABLET, FILM COATED ORAL ONCE
Status: COMPLETED | OUTPATIENT
Start: 2024-01-03 | End: 2024-01-03

## 2024-01-03 RX ORDER — DONEPEZIL HYDROCHLORIDE 5 MG/1
5 TABLET, FILM COATED ORAL NIGHTLY
Status: DISCONTINUED | OUTPATIENT
Start: 2024-01-03 | End: 2024-01-06 | Stop reason: HOSPADM

## 2024-01-03 RX ORDER — FAMOTIDINE 10 MG/ML
20 INJECTION, SOLUTION INTRAVENOUS ONCE
Status: COMPLETED | OUTPATIENT
Start: 2024-01-03 | End: 2024-01-03

## 2024-01-03 RX ORDER — IPRATROPIUM BROMIDE AND ALBUTEROL SULFATE 2.5; .5 MG/3ML; MG/3ML
3 SOLUTION RESPIRATORY (INHALATION) ONCE AS NEEDED
Status: DISCONTINUED | OUTPATIENT
Start: 2024-01-03 | End: 2024-01-03 | Stop reason: HOSPADM

## 2024-01-03 RX ORDER — EPHEDRINE SULFATE 50 MG/ML
5 INJECTION, SOLUTION INTRAVENOUS ONCE AS NEEDED
Status: DISCONTINUED | OUTPATIENT
Start: 2024-01-03 | End: 2024-01-03 | Stop reason: HOSPADM

## 2024-01-03 RX ADMIN — SUGAMMADEX 200 MG: 100 INJECTION, SOLUTION INTRAVENOUS at 16:11

## 2024-01-03 RX ADMIN — HYDROMORPHONE HYDROCHLORIDE 0.5 MG: 1 INJECTION, SOLUTION INTRAMUSCULAR; INTRAVENOUS; SUBCUTANEOUS at 16:13

## 2024-01-03 RX ADMIN — DEXAMETHASONE SODIUM PHOSPHATE 8 MG: 4 INJECTION, SOLUTION INTRAMUSCULAR; INTRAVENOUS at 08:48

## 2024-01-03 RX ADMIN — ROCURONIUM BROMIDE 10 MG: 10 INJECTION, SOLUTION INTRAVENOUS at 11:23

## 2024-01-03 RX ADMIN — CLINDAMYCIN PHOSPHATE 900 MG: 300 INJECTION, SOLUTION INTRAVENOUS at 14:22

## 2024-01-03 RX ADMIN — DOCUSATE SODIUM 100 MG: 100 CAPSULE, LIQUID FILLED ORAL at 22:13

## 2024-01-03 RX ADMIN — ROCURONIUM BROMIDE 20 MG: 10 INJECTION, SOLUTION INTRAVENOUS at 11:28

## 2024-01-03 RX ADMIN — Medication 20 MG: at 09:36

## 2024-01-03 RX ADMIN — LIDOCAINE HYDROCHLORIDE 60 MG: 20 INJECTION, SOLUTION INFILTRATION; PERINEURAL at 16:13

## 2024-01-03 RX ADMIN — GLYCOPYRROLATE 0.1 MCG: 0.2 INJECTION INTRAMUSCULAR; INTRAVENOUS at 09:03

## 2024-01-03 RX ADMIN — MAGNESIUM SULFATE HEPTAHYDRATE 1 G: 500 INJECTION, SOLUTION INTRAMUSCULAR; INTRAVENOUS at 11:41

## 2024-01-03 RX ADMIN — SODIUM CHLORIDE, POTASSIUM CHLORIDE, SODIUM LACTATE AND CALCIUM CHLORIDE: 600; 310; 30; 20 INJECTION, SOLUTION INTRAVENOUS at 13:43

## 2024-01-03 RX ADMIN — FAMOTIDINE 20 MG: 10 INJECTION INTRAVENOUS at 07:55

## 2024-01-03 RX ADMIN — LIDOCAINE HYDROCHLORIDE 100 MG: 20 INJECTION, SOLUTION INFILTRATION; PERINEURAL at 08:28

## 2024-01-03 RX ADMIN — Medication 10 MG: at 10:37

## 2024-01-03 RX ADMIN — ROCURONIUM BROMIDE 10 MG: 10 INJECTION, SOLUTION INTRAVENOUS at 13:59

## 2024-01-03 RX ADMIN — IPRATROPIUM BROMIDE AND ALBUTEROL SULFATE 3 ML: 2.5; .5 SOLUTION RESPIRATORY (INHALATION) at 07:42

## 2024-01-03 RX ADMIN — FENTANYL CITRATE 50 MCG: 50 INJECTION, SOLUTION INTRAMUSCULAR; INTRAVENOUS at 09:40

## 2024-01-03 RX ADMIN — ROCURONIUM BROMIDE 50 MG: 10 INJECTION, SOLUTION INTRAVENOUS at 08:29

## 2024-01-03 RX ADMIN — CLINDAMYCIN PHOSPHATE 900 MG: 300 INJECTION, SOLUTION INTRAVENOUS at 08:22

## 2024-01-03 RX ADMIN — EPHEDRINE SULFATE 5 MG: 50 INJECTION INTRAVENOUS at 11:52

## 2024-01-03 RX ADMIN — CLINDAMYCIN PHOSPHATE 600 MG: 600 INJECTION, SOLUTION INTRAVENOUS at 22:14

## 2024-01-03 RX ADMIN — Medication 20 MG: at 12:10

## 2024-01-03 RX ADMIN — SODIUM CHLORIDE 100 ML/HR: 9 INJECTION, SOLUTION INTRAVENOUS at 21:15

## 2024-01-03 RX ADMIN — LEVOFLOXACIN 500 MG: 500 INJECTION, SOLUTION INTRAVENOUS at 08:14

## 2024-01-03 RX ADMIN — DONEPEZIL HYDROCHLORIDE 5 MG: 5 TABLET, FILM COATED ORAL at 22:13

## 2024-01-03 RX ADMIN — PHENAZOPYRIDINE 200 MG: 200 TABLET ORAL at 07:55

## 2024-01-03 RX ADMIN — SODIUM CHLORIDE, POTASSIUM CHLORIDE, SODIUM LACTATE AND CALCIUM CHLORIDE 9 ML/HR: 600; 310; 30; 20 INJECTION, SOLUTION INTRAVENOUS at 07:55

## 2024-01-03 RX ADMIN — FENTANYL CITRATE 25 MCG: 50 INJECTION, SOLUTION INTRAMUSCULAR; INTRAVENOUS at 13:40

## 2024-01-03 RX ADMIN — IBUPROFEN 400 MG: 400 TABLET, FILM COATED ORAL at 22:13

## 2024-01-03 RX ADMIN — ROCURONIUM BROMIDE 20 MG: 10 INJECTION, SOLUTION INTRAVENOUS at 10:37

## 2024-01-03 RX ADMIN — ROCURONIUM BROMIDE 20 MG: 10 INJECTION, SOLUTION INTRAVENOUS at 09:37

## 2024-01-03 RX ADMIN — FENTANYL CITRATE 25 MCG: 50 INJECTION, SOLUTION INTRAMUSCULAR; INTRAVENOUS at 12:17

## 2024-01-03 RX ADMIN — EPHEDRINE SULFATE 10 MG: 50 INJECTION INTRAVENOUS at 14:47

## 2024-01-03 RX ADMIN — SODIUM CHLORIDE, POTASSIUM CHLORIDE, SODIUM LACTATE AND CALCIUM CHLORIDE: 600; 310; 30; 20 INJECTION, SOLUTION INTRAVENOUS at 10:13

## 2024-01-03 RX ADMIN — PROPOFOL 150 MG: 10 INJECTION, EMULSION INTRAVENOUS at 08:28

## 2024-01-03 RX ADMIN — EPHEDRINE SULFATE 5 MG: 50 INJECTION INTRAVENOUS at 09:57

## 2024-01-03 RX ADMIN — ROCURONIUM BROMIDE 20 MG: 10 INJECTION, SOLUTION INTRAVENOUS at 13:11

## 2024-01-03 RX ADMIN — PROPOFOL 50 MG: 10 INJECTION, EMULSION INTRAVENOUS at 08:30

## 2024-01-03 NOTE — ANESTHESIA PROCEDURE NOTES
Airway  Urgency: elective    Date/Time: 1/3/2024 8:32 AM  Airway not difficult    General Information and Staff    Patient location during procedure: OR  Anesthesiologist: Lev Villasenor MD  CRNA/CAA: Jama Ulloa CRNA    Indications and Patient Condition  Indications for airway management: airway protection    Preoxygenated: yes  MILS not maintained throughout  Mask difficulty assessment: 1 - vent by mask    Final Airway Details  Final airway type: endotracheal airway      Successful airway: ETT  Cuffed: yes   Successful intubation technique: direct laryngoscopy  Facilitating devices/methods: anterior pressure/BURP  Endotracheal tube insertion site: oral  Blade: Val  Blade size: 3  ETT size (mm): 7.0  Cormack-Lehane Classification: grade IIb - view of arytenoids or posterior of glottis only  Placement verified by: chest auscultation and capnometry   Cuff volume (mL): 8  Measured from: lips  ETT/EBT  to lips (cm): 22  Number of attempts at approach: 1  Assessment: lips, teeth, and gum same as pre-op and atraumatic intubation

## 2024-01-03 NOTE — NURSING NOTE
Per Jovanny Ivan to send to OR with only 1 IV. Multiple attempts were made to obtain 2 IVs and patient had restricted arm on right.

## 2024-01-03 NOTE — H&P
Female Pelvic Medicine and Reconstructive Surgery   History & Physical    Patient Identification:  Name: Marcia Ulloa Age: 78 y.o. Sex: female :  1945 MRN: Female Pelvic Medicine and Reconstructive Surgery   History & Physical    Patient Identification:  Name: Marcia Ulloa Age: 78 y.o. Sex: female :  1945 MRN: [id number]                            Problem List:    Vaginal vault prolapse    Cystocele, midline    Cystocele, lateral    Loss of perineal body, female    Incompetence or weakening of rectovaginal tissue    Vaginal enterocele    Rectocele    Loss of perineal body, female    Past Medical History:  Past Medical History:   Diagnosis Date    Arthritis     Bowel incontinence     Breast cancer     Rt    Clotting disorder in hospital in July with colon bleeding    Colon polyp     Cystocele with rectocele     Drug therapy     femara 5-7 years.    Eczema     History of shingles     HL (hearing loss) somewhat    Hyperlipidemia     Hypertension     Irritable bowel syndrome     Knee pain     Prolapse of female bladder, acquired     Seasonal allergies     Slow to wake up after anesthesia     SOB (shortness of breath) on exertion     Urinary incontinence     Urinary tract infection      Past Surgical History:  Past Surgical History:   Procedure Laterality Date    ANTERIOR AND POSTERIOR VAGINAL REPAIR N/A 2020    Procedure: LAPAROSCOPIC/ABDOMINAL ENTEROCELE REPAIR;;  Surgeon: Senia Frederick MD;  Location: [facility];  Service: Gynecology    BREAST BIOPSY Right     malignant in 3 quadrents.    BREAST SURGERY  2010    mastectomy    COLONOSCOPY  can not remember    HYSTERECTOMY  2020    JOINT REPLACEMENT  2022    Wonderful surgery!    KNEE SURGERY      MASTECTOMY Right     PUBOVAGINAL SLING N/A 2020    Procedure: PUBOVAGINAL SLING; POSTERIOR COLPORRHAPHY; ANTERIOR COLPORRHAPHY; ENTRAPERITONEAL COLPOPEXY SACROSPINUS LIGAMENT FIXATION; INSERTION OF VAGINAL GRAFT;  CYSTOURETHROSCOPY;  Surgeon: Senia Frederick MD;  Location: [facility] OR;  Service: Gynecology    REDUCTION MAMMAPLASTY Left     TOTAL LAPAROSCOPIC HYSTERECTOMY, SACROCOLPOPEXY N/A 01/07/2020    Procedure: LAPAROSCOPIC UTEROSCRAL LIGAMENT SUSPENSION/SACRAL COLPOPEXY; LAPAROSCOPIC PARAVAGINAL REPAIR; TOTAL LAPAROSCPIC HYSTERECTOMY WITH BILATERAL SALPINGECTOMY;;  Surgeon: Senia Frederick MD;  Location: [facility] OR;  Service: Gynecology    TUBAL ABDOMINAL LIGATION        Home Meds:  Medications Prior to Admission   Medication Sig Dispense Refill Last Dose    acetaminophen (TYLENOL) 325 MG tablet Take 1 tablet by mouth Every 6 (Six) Hours As Needed.       albuterol sulfate  (90 Base) MCG/ACT inhaler INHALE 2 PUFFS EVERY 4 HOURS AS NEEDED FOR WHEEZING OR SHORTNESS OF AIR 18 g 2     amLODIPine (NORVASC) 5 MG tablet Take 0.5 tablets by mouth Daily. 90 tablet 2     aspirin 81 MG EC tablet Take 1 tablet by mouth Daily. INSTRUCTED PT TO FOLLOW MD INSTRUCTIONS REGARDING HOLDING FOR SURGERY       Calcium Carbonate-Vitamin D (CALCIUM-CARB 600 + D PO) Take 1 tablet by mouth Daily. HOLD PRIOR TO SURG       CRANBERRY PO Take 1 tablet by mouth Daily. HOLD PRIOR TO SURG       donepezil (ARICEPT) 5 MG tablet Take 1 tablet by mouth Every Night.       melatonin 3 MG tablet Take 1 tablet by mouth At Night As Needed for Sleep.       Multiple Vitamin (MULTI VITAMIN DAILY PO) Take 1 tablet by mouth Daily. HOLD PRIOR TO SURG       Probiotic Product (PROBIOTIC PO) Take  by mouth. HOLD PRIOR TO SURG       rosuvastatin (CRESTOR) 5 MG tablet Take 1 tablet by mouth Daily. 90 tablet 3     TURMERIC PO Take 1 tablet by mouth Daily. HOLD PRIOR TO SURG        Current Meds:     Current Facility-Administered Medications:     levoFLOXacin (LEVAQUIN) 500 mg/100 mL D5W (premix) (LEVAQUIN) 500 mg, 500 mg, Intravenous, Once **AND** clindamycin (CLEOCIN) 900 mg in dextrose 5% 50 mL IVPB (premix), 900 mg, Intravenous, Once, Senia Frederick MD     famotidine (PEPCID) injection 20 mg, 20 mg, Intravenous, Once, Lev Villasenor MD    famotidine (PEPCID) injection 20 mg, 20 mg, Intravenous, Once, Lev Villasenor MD    fentaNYL citrate (PF) (SUBLIMAZE) injection 50 mcg, 50 mcg, Intravenous, Once PRN, Lev Villasenor MD    fentaNYL citrate (PF) (SUBLIMAZE) injection 50 mcg, 50 mcg, Intravenous, Once PRN, Lev Villasenor MD    ipratropium-albuterol (DUO-NEB) nebulizer solution 3 mL, 3 mL, Nebulization, Once, Lev Villasenor MD    lactated ringers infusion, 9 mL/hr, Intravenous, Continuous, Lev Villasenor MD    lactated ringers infusion, 9 mL/hr, Intravenous, Continuous, Lev Villasenor MD    lidocaine (XYLOCAINE) 1 % injection 0.5 mL, 0.5 mL, Intradermal, Once PRN, Lev Villasenor MD    lidocaine (XYLOCAINE) 1 % injection 0.5 mL, 0.5 mL, Intradermal, Once PRN, Lev Villasenor MD    midazolam (VERSED) injection 0.5 mg, 0.5 mg, Intravenous, Q5 Min PRN, Lev Villasenor MD    midazolam (VERSED) injection 0.5 mg, 0.5 mg, Intravenous, Q5 Min PRN, Lev Villasenor MD    phenazopyridine (PYRIDIUM) tablet 200 mg, 200 mg, Oral, Once, Senia Frederick MD    sodium chloride 0.9 % flush 10 mL, 10 mL, Intravenous, Q12H, Senia Frederick MD    sodium chloride 0.9 % flush 10 mL, 10 mL, Intravenous, PRN, Senia Frederick MD    sodium chloride 0.9 % flush 3 mL, 3 mL, Intravenous, Q12H, Lev Villasenor MD    sodium chloride 0.9 % flush 3 mL, 3 mL, Intravenous, Q12H, Lev Villasenor MD    sodium chloride 0.9 % flush 3-10 mL, 3-10 mL, Intravenous, PRN, Lev Villasenor MD    sodium chloride 0.9 % flush 3-10 mL, 3-10 mL, Intravenous, PRN, Lev Villasenor MD    sodium chloride 0.9 % infusion 40 mL, 40 mL, Intravenous, Otoniel GUY Susan S B, MD  Allergies:  Allergies   Allergen Reactions    Penicillins Unknown (See Comments)     UNSURE-AS A CHILD     Immunizations:  Immunization History    Administered Date(s) Administered    COVID-19 (PFIZER) BIVALENT 12+YRS 09/23/2022, 09/26/2023    COVID-19 (PFIZER) Purple Cap Monovalent 02/09/2021, 03/02/2021, 10/07/2021, 05/05/2022    Covid-19 (Pfizer) Gray Cap Monovalent 05/05/2022    Flu Vaccine Intradermal Quad 18-64YR 10/06/2013    FluMist 2-49yrs 10/01/2017    Fluad Quad 65+ 08/28/2020, 09/21/2022    Fluzone High Dose =>65 Years ([employer]axcare ONLY) 10/06/2016, 10/06/2017, 10/05/2018, 10/10/2019    Fluzone High-Dose 65+yrs 10/01/2021    Hepatitis A 04/12/2005, 10/13/2005    Influenza Quad Vaccine (Inpatient) 10/01/2016    Influenza TIV (IM) 10/01/2013    Pneumococcal Conjugate 13-Valent (PCV13) 05/01/2017    Pneumococcal Polysaccharide (PPSV23) 05/24/2018, 07/01/2018    Shingrix 12/01/2020, 04/17/2021    Td (TDVAX) 04/25/2003    Tdap 01/02/2019    Typhoid, Unspecified 05/12/2005, 06/05/2007     Social History:   Social History     Tobacco Use    Smoking status: Never    Smokeless tobacco: Never   Substance Use Topics    Alcohol use: Not Currently     Alcohol/week: 2.0 standard drinks of alcohol     Types: 1 Glasses of wine, 1 Drinks containing 0.5 oz of alcohol per week     Comment: [place]      Family History:  Family History   Problem Relation Age of Onset    COPD Mother     Heart failure Mother     Hypertension Mother     Heart disease Mother     Depression Mother     Vision loss Mother     Anxiety disorder Mother     Heart failure Father     Hypertension Father     Heart disease Father         early fifties    COPD Father         Heart condition in his early fifties    Vision loss Maternal Grandfather     Malig Hyperthermia Neg Hx     Breast cancer Neg Hx     Ovarian cancer Neg Hx     Colon cancer Neg Hx     Colon polyps Neg Hx     Crohn's disease Neg Hx     Irritable bowel syndrome Neg Hx     Ulcerative colitis Neg Hx         Review of Systems  Constitutional:  Denies fever or chills   Eyes:  Denies change in visual acuity   HEENT:  Denies nasal congestion  or sore throat   Respiratory:  Denies cough or shortness of breath   Cardiovascular:  Denies chest pain or edema   GI:  Denies abdominal pain, nausea, vomiting, bloody stools or diarrhea   :  Denies dysuria   Musculoskeletal:  Denies back pain or joint pain   Integument:  Denies rash   Neurologic:  Denies headache, focal weakness or sensory changes   Endocrine:  Denies polyuria or polydipsia   Lymphatic:  Denies swollen glands   Psychiatric:  Denies depression or anxiety       Objective:  tMax 24 hrs: Temp (24hrs), Av.3 °F (37.4 °C), Min:99.3 °F (37.4 °C), Max:99.3 °F (37.4 °C)    Vitals Ranges:   Temp:  [99.3 °F (37.4 °C)] 99.3 °F (37.4 °C)  Heart Rate:  [84] 84  Resp:  [16] 16  BP: (144)/(71) 144/71    Exam:  Vital Signs: /71 (BP Location: Left arm, Patient Position: Sitting)   Pulse 84   Temp 99.3 °F (37.4 °C) (Oral)   Resp 16   SpO2 97%   Constitutional:  Well developed, well nourished, no acute distress, non-toxic appearance    GI:  Soft, nondistended, normal bowel sounds, nontender, no organomegaly, no mass, no rebound, no guarding   :  No costovertebral angle tenderness   Musculoskeletal:  No edema, no tenderness, no deformities. Back- no tenderness  Integument:  Well hydrated, no rash   Lymphatic:  No lymphadenopathy noted   Neurologic:  Alert & oriented x 3,  Pelvic:     POPQ      Assessment:    Cystocele, midline    Cystocele, lateral    Loss of perineal body, female    Incompetence or weakening of rectovaginal tissue    Vaginal vault prolapse    Vaginal enterocele    Rectocele    Loss of perineal body, female        Plan:  Laparoscopic uterosacral ligament colpopexy sacral colpopexy  Laparoscopic lysis of intestinal adhesions   Laparoscopic paravaginal repair  Laparoscopic abdominal enterocele repair  Posterior colporrhaphy  Anterior colporrhaphy  Extraperitoneal colpopexy sacrospinous ligament fixation  Insertion of vaginal grafts  Cystourethroscopy     Senia Frederick MD  1/3/2024

## 2024-01-04 LAB
HCT VFR BLD AUTO: 32.1 % (ref 34–46.6)
HGB BLD-MCNC: 11 G/DL (ref 12–15.9)
LAB AP CASE REPORT: NORMAL
PATH REPORT.FINAL DX SPEC: NORMAL
PATH REPORT.GROSS SPEC: NORMAL

## 2024-01-04 PROCEDURE — A9270 NON-COVERED ITEM OR SERVICE: HCPCS | Performed by: OBSTETRICS & GYNECOLOGY

## 2024-01-04 PROCEDURE — 63710000001 DOCUSATE SODIUM 100 MG CAPSULE: Performed by: OBSTETRICS & GYNECOLOGY

## 2024-01-04 PROCEDURE — 85018 HEMOGLOBIN: CPT | Performed by: OBSTETRICS & GYNECOLOGY

## 2024-01-04 PROCEDURE — 25010000002 ENOXAPARIN PER 10 MG: Performed by: OBSTETRICS & GYNECOLOGY

## 2024-01-04 PROCEDURE — 63710000001 DONEPEZIL 5 MG TABLET: Performed by: OBSTETRICS & GYNECOLOGY

## 2024-01-04 PROCEDURE — 63710000001 IBUPROFEN 400 MG TABLET: Performed by: OBSTETRICS & GYNECOLOGY

## 2024-01-04 PROCEDURE — 25010000002 CLINDAMYCIN 600 MG/50ML SOLUTION: Performed by: OBSTETRICS & GYNECOLOGY

## 2024-01-04 PROCEDURE — 63710000001 ASPIRIN 81 MG TABLET DELAYED-RELEASE: Performed by: OBSTETRICS & GYNECOLOGY

## 2024-01-04 PROCEDURE — 63710000001 AMLODIPINE 2.5 MG TABLET: Performed by: OBSTETRICS & GYNECOLOGY

## 2024-01-04 PROCEDURE — G0378 HOSPITAL OBSERVATION PER HR: HCPCS

## 2024-01-04 PROCEDURE — 25810000003 SODIUM CHLORIDE 0.9 % SOLUTION: Performed by: OBSTETRICS & GYNECOLOGY

## 2024-01-04 PROCEDURE — 63710000001 MELATONIN 1 MG TABLET: Performed by: OBSTETRICS & GYNECOLOGY

## 2024-01-04 PROCEDURE — 63710000001 ACETAMINOPHEN-CODEINE 300-30 MG TABLET: Performed by: OBSTETRICS & GYNECOLOGY

## 2024-01-04 PROCEDURE — 63710000001 ROSUVASTATIN 5 MG TABLET: Performed by: OBSTETRICS & GYNECOLOGY

## 2024-01-04 PROCEDURE — 85014 HEMATOCRIT: CPT | Performed by: OBSTETRICS & GYNECOLOGY

## 2024-01-04 RX ADMIN — ACETAMINOPHEN AND CODEINE PHOSPHATE 1 TABLET: 300; 30 TABLET ORAL at 00:57

## 2024-01-04 RX ADMIN — IBUPROFEN 400 MG: 400 TABLET, FILM COATED ORAL at 21:00

## 2024-01-04 RX ADMIN — Medication 3 MG: at 23:21

## 2024-01-04 RX ADMIN — IBUPROFEN 400 MG: 400 TABLET, FILM COATED ORAL at 15:57

## 2024-01-04 RX ADMIN — IBUPROFEN 400 MG: 400 TABLET, FILM COATED ORAL at 12:54

## 2024-01-04 RX ADMIN — ASPIRIN 81 MG: 81 TABLET, COATED ORAL at 09:08

## 2024-01-04 RX ADMIN — SODIUM CHLORIDE 100 ML/HR: 9 INJECTION, SOLUTION INTRAVENOUS at 09:46

## 2024-01-04 RX ADMIN — ACETAMINOPHEN AND CODEINE PHOSPHATE 1 TABLET: 300; 30 TABLET ORAL at 14:53

## 2024-01-04 RX ADMIN — IBUPROFEN 400 MG: 400 TABLET, FILM COATED ORAL at 09:08

## 2024-01-04 RX ADMIN — AMLODIPINE BESYLATE 2.5 MG: 2.5 TABLET ORAL at 09:08

## 2024-01-04 RX ADMIN — DONEPEZIL HYDROCHLORIDE 5 MG: 5 TABLET, FILM COATED ORAL at 21:00

## 2024-01-04 RX ADMIN — DOCUSATE SODIUM 100 MG: 100 CAPSULE, LIQUID FILLED ORAL at 09:08

## 2024-01-04 RX ADMIN — ROSUVASTATIN CALCIUM 5 MG: 5 TABLET, FILM COATED ORAL at 09:08

## 2024-01-04 RX ADMIN — DOCUSATE SODIUM 100 MG: 100 CAPSULE, LIQUID FILLED ORAL at 21:02

## 2024-01-04 RX ADMIN — CLINDAMYCIN PHOSPHATE 600 MG: 600 INJECTION, SOLUTION INTRAVENOUS at 06:47

## 2024-01-04 RX ADMIN — ENOXAPARIN SODIUM 40 MG: 100 INJECTION SUBCUTANEOUS at 09:09

## 2024-01-04 RX ADMIN — IBUPROFEN 400 MG: 400 TABLET, FILM COATED ORAL at 02:20

## 2024-01-04 NOTE — PROGRESS NOTES
Continued Stay Note  [facility]     Patient Name: Marcia Ulloa  MRN: [id number]  Today's Date: 1/4/2024    Admit Date: 1/3/2024    Plan: Home no needs   Discharge Plan       Row Name 01/04/24 1323       Plan    Plan Home no needs    Plan Comments Introduced self and role of CCP. Patient confirmed DC plan is to return to home. Stated she is independent with ADL's and uses no DMEs. Stated her spouse will assist as needed and will provide transportation at DC. Denies any needs/equipment.                   Discharge Codes    No documentation.                 Expected Discharge Date and Time       Expected Discharge Date Expected Discharge Time    Jan 4, 2024               Senia Girard, RN

## 2024-01-04 NOTE — NURSING NOTE
01/04/2023    0840 amb to BR and voided only a couple of drops    1040 amb to BR and unable to void  Bladder scan 412 cc.   14 fr catheter inserted and balloon inflated to 10 cc.    375 cc urine returned.    Catheter plugged for bladder training.    CHornung RN

## 2024-01-04 NOTE — PLAN OF CARE
"Goal Outcome Evaluation:  Plan of Care Reviewed With: patient        Progress: improving  Outcome Evaluation: Here late from surgery. Very sleepy for a while. Lap sites x5 with white bandaids and steri-strip. f/C with orange urine quantity sufficient. Packing intact and no vaginal bleeding. Packing was removed this am and so was f/c. To start voiding trial this am. VS stable. Pt unable to ambulate tongt. She c/o \"too much pain\". Medicated with scheduled advil and tylenol#3. O2 removed this am.         "

## 2024-01-04 NOTE — ANESTHESIA POSTPROCEDURE EVALUATION
Patient: Marcia Ulloa    Procedure Summary       Date: 01/03/24 Room / Location: [facility] OR [address] [place] [address] MAIN OR    Anesthesia Start: 0822 Anesthesia Stop: 1631    Procedure: LAPAROSCOPIC UTEROSACRAL SUSPENSION /SACRAL COLPOPEXY; LAPAROSCOPIC PARAVAGINAL REPAIR; LAPAROSCOPIC/ABDOMINAL ENTEROCELE REPAIR; CYSTOURETHROSCOPY (Abdomen) Diagnosis:     Surgeons: Senia Frederick MD Provider: Lev Villasenor MD    Anesthesia Type: general ASA Status: 2            Anesthesia Type: general    Vitals  Vitals Value Taken Time   /75 01/03/24 1945   Temp 36.9 °C (98.4 °F) 01/03/24 1945   Pulse 95 01/03/24 1941   Resp 16 01/03/24 1629   SpO2 98 % 01/03/24 1941   Vitals shown include unfiled device data.        Post Anesthesia Care and Evaluation    Patient location during evaluation: bedside  Patient participation: complete - patient participated  Level of consciousness: awake  Pain management: adequate    Airway patency: patent  Anesthetic complications: No anesthetic complications  PONV Status: controlled  Cardiovascular status: acceptable  Respiratory status: acceptable  Hydration status: acceptable    Comments: /74 (BP Location: Left arm, Patient Position: Lying)   Pulse 83   Temp 35.8 °C (96.5 °F) (Oral)   Resp 16   SpO2 96%

## 2024-01-04 NOTE — PLAN OF CARE
Goal Outcome Evaluation:  Plan of Care Reviewed With: patient        Progress: improving  Outcome Evaluation: AFVSS.  alert, cooperative , forgetful.  failed voiding trial.  uretheral catheter inserted, plugged for bladder training.  scant vaginal drainage.  abd surgical sites with border dressings CDI.   no pain at rest.  c/o pain with activity but reluctant to take prn med.  up to chair for meals.  slow to ambulate, requires assist of 1.

## 2024-01-05 PROCEDURE — 63710000001 ASPIRIN 81 MG TABLET DELAYED-RELEASE: Performed by: OBSTETRICS & GYNECOLOGY

## 2024-01-05 PROCEDURE — G0378 HOSPITAL OBSERVATION PER HR: HCPCS

## 2024-01-05 PROCEDURE — A9270 NON-COVERED ITEM OR SERVICE: HCPCS | Performed by: OBSTETRICS & GYNECOLOGY

## 2024-01-05 PROCEDURE — 63710000001 ROSUVASTATIN 5 MG TABLET: Performed by: OBSTETRICS & GYNECOLOGY

## 2024-01-05 PROCEDURE — 63710000001 IBUPROFEN 400 MG TABLET: Performed by: OBSTETRICS & GYNECOLOGY

## 2024-01-05 PROCEDURE — 25010000002 ENOXAPARIN PER 10 MG: Performed by: OBSTETRICS & GYNECOLOGY

## 2024-01-05 PROCEDURE — 63710000001 DOCUSATE SODIUM 100 MG CAPSULE: Performed by: OBSTETRICS & GYNECOLOGY

## 2024-01-05 PROCEDURE — 63710000001 DONEPEZIL 5 MG TABLET: Performed by: OBSTETRICS & GYNECOLOGY

## 2024-01-05 PROCEDURE — 63710000001 AMLODIPINE 2.5 MG TABLET: Performed by: OBSTETRICS & GYNECOLOGY

## 2024-01-05 RX ADMIN — IBUPROFEN 400 MG: 400 TABLET, FILM COATED ORAL at 14:11

## 2024-01-05 RX ADMIN — IBUPROFEN 400 MG: 400 TABLET, FILM COATED ORAL at 09:58

## 2024-01-05 RX ADMIN — IBUPROFEN 400 MG: 400 TABLET, FILM COATED ORAL at 05:51

## 2024-01-05 RX ADMIN — IBUPROFEN 400 MG: 400 TABLET, FILM COATED ORAL at 18:22

## 2024-01-05 RX ADMIN — ROSUVASTATIN CALCIUM 5 MG: 5 TABLET, FILM COATED ORAL at 08:29

## 2024-01-05 RX ADMIN — IBUPROFEN 400 MG: 400 TABLET, FILM COATED ORAL at 01:23

## 2024-01-05 RX ADMIN — AMLODIPINE BESYLATE 2.5 MG: 2.5 TABLET ORAL at 08:29

## 2024-01-05 RX ADMIN — ASPIRIN 81 MG: 81 TABLET, COATED ORAL at 08:29

## 2024-01-05 RX ADMIN — DONEPEZIL HYDROCHLORIDE 5 MG: 5 TABLET, FILM COATED ORAL at 20:56

## 2024-01-05 RX ADMIN — ENOXAPARIN SODIUM 40 MG: 100 INJECTION SUBCUTANEOUS at 08:29

## 2024-01-05 RX ADMIN — DOCUSATE SODIUM 100 MG: 100 CAPSULE, LIQUID FILLED ORAL at 08:29

## 2024-01-05 RX ADMIN — DOCUSATE SODIUM 100 MG: 100 CAPSULE, LIQUID FILLED ORAL at 20:56

## 2024-01-05 NOTE — PROGRESS NOTES
DAILY PROGRESS NOTE    Patient Identification:  Name: Marcia Ulloa  Age: 78 y.o.  Sex: female  :  1945  MRN: [id number]              Subjective:  Interval History:   Tolerating diet  Pain controlled on oral medications  Has failed voiding trial and catheter replaced to plug unplug  Patient has not ambulated satisfactorily today      Objective:    Scheduled Meds:   Current Facility-Administered Medications:     acetaminophen-codeine (TYLENOL with CODEINE #3) 300-30 MG per tablet 1 tablet, 1 tablet, Oral, Q4H PRN, Senia Frederick MD, 1 tablet at 24 1453    albuterol (PROVENTIL) nebulizer solution 0.083% 2.5 mg/3mL, 2.5 mg, Nebulization, Q4H PRN, Senia Frederick MD    amLODIPine (NORVASC) tablet 2.5 mg, 2.5 mg, Oral, Daily, Senia Frederick MD, 2.5 mg at 24 0829    aspirin EC tablet 81 mg, 81 mg, Oral, Daily, Senia Frederick MD, 81 mg at 24 0829    docusate sodium (COLACE) capsule 100 mg, 100 mg, Oral, BID, Senia Frederick MD, 100 mg at 24 0829    donepezil (ARICEPT) tablet 5 mg, 5 mg, Oral, Nightly, Senia Frederick MD, 5 mg at 24 2100    Enoxaparin Sodium (LOVENOX) syringe 40 mg, 40 mg, Subcutaneous, Daily, Senia Frederick MD, 40 mg at 24 0829    ibuprofen (ADVIL,MOTRIN) tablet 400 mg, 400 mg, Oral, Q4H, Senia Frederick MD, 400 mg at 24 1822    lactated ringers infusion, 9 mL/hr, Intravenous, Continuous, Lev Villasenor MD, Stopped at 24 1624    lactated ringers infusion, 9 mL/hr, Intravenous, Continuous, Lev Villasenor MD    melatonin tablet 3 mg, 3 mg, Oral, Nightly PRN, Senia Frederick MD, 3 mg at 24 2114    ondansetron (ZOFRAN) tablet 4 mg, 4 mg, Oral, Q6H PRN **OR** ondansetron (ZOFRAN) injection 4 mg, 4 mg, Intravenous, Q6H PRN, Senia Frederick MD    promethazine (PHENERGAN) tablet 12.5 mg, 12.5 mg, Oral, Q6H PRN **OR** promethazine (PHENERGAN) suppository 12.5 mg, 12.5 mg, Rectal, Q6H PRN, Senia Frederick MD    rosuvastatin  (CRESTOR) tablet 5 mg, 5 mg, Oral, Daily, Senia Frederick MD, 5 mg at 01/05/24 0829    sodium chloride 0.9 % infusion, 100 mL/hr, Intravenous, Continuous, Senia Frederick MD, Stopped at 01/04/24 1830lactated ringers, 9 mL/hr, Last Rate: Stopped (01/03/24 1624)  lactated ringers, 9 mL/hr  sodium chloride, 100 mL/hr, Last Rate: Stopped (01/04/24 1830)      Continuous Infusions:lactated ringers, 9 mL/hr, Last Rate: Stopped (01/03/24 1624)  lactated ringers, 9 mL/hr  sodium chloride, 100 mL/hr, Last Rate: Stopped (01/04/24 1830)      PRN Meds:  acetaminophen-codeine    albuterol    melatonin    ondansetron **OR** ondansetron    promethazine **OR** promethazine          Exam:  Abdomen soft   Non tender  Bandages intact  Minimal vaginal spotting      Data Review:    H/H 11 / 31    Assessment:    Vaginal vault prolapse    Cystocele, midline    Cystocele, lateral    Loss of perineal body, female    Incompetence or weakening of rectovaginal tissue    Vaginal enterocele    Rectocele    Loss of perineal body, female        Plan:    POD 1  Failed voiding trial  CAtheter replaced  Needs to ambulate more and   Instructed to ambulate tonight and tomorrow  Will repeat voiding trial tomorrow     1/4/2024

## 2024-01-05 NOTE — PLAN OF CARE
Goal Outcome Evaluation:  Plan of Care Reviewed With: patient        Progress: improving  Outcome Evaluation: Patient performing voiding trial all shift. Voiding, but retaining urine. Indwelling catheter inserted with plug, but removed per patient. Straight cath performed numerous times, will continue voiding trial. Only c/o minor discomfort, scheduled Ibuprofen given. IV removed per patient this AM. Ambulated in [place] with assist x 1 this afternoon. Up in chair and to bathroom with assist. Fall precautions maintained. Family at bedside. VSS.

## 2024-01-05 NOTE — PLAN OF CARE
Goal Outcome Evaluation:  Plan of Care Reviewed With: patient        Progress: improving  Outcome Evaluation: vss, ibuprofen for pain, up with assist and using a walker, brandt in place plugged, voiding w/o difficulty tgru brandt, ambulated in the hallway, low transverse incision with glue, d/c brandt in am then for voiding trial again, continue to monitor the pt.

## 2024-01-05 NOTE — NURSING NOTE
Patient up to bathroom, unable to void. Bladder scan 150mL. Catheter inserted with 240mL urine returned, plugged for bladder training.

## 2024-01-06 ENCOUNTER — READMISSION MANAGEMENT (OUTPATIENT)
Dept: CALL CENTER | Facility: HOSPITAL | Age: 79
End: 2024-01-06
Payer: MEDICARE

## 2024-01-06 VITALS
DIASTOLIC BLOOD PRESSURE: 81 MMHG | TEMPERATURE: 98.9 F | RESPIRATION RATE: 18 BRPM | HEART RATE: 85 BPM | OXYGEN SATURATION: 94 % | SYSTOLIC BLOOD PRESSURE: 134 MMHG

## 2024-01-06 PROCEDURE — A9270 NON-COVERED ITEM OR SERVICE: HCPCS | Performed by: OBSTETRICS & GYNECOLOGY

## 2024-01-06 PROCEDURE — 63710000001 ASPIRIN 81 MG TABLET DELAYED-RELEASE: Performed by: OBSTETRICS & GYNECOLOGY

## 2024-01-06 PROCEDURE — 63710000001 AMLODIPINE 2.5 MG TABLET: Performed by: OBSTETRICS & GYNECOLOGY

## 2024-01-06 PROCEDURE — G0378 HOSPITAL OBSERVATION PER HR: HCPCS

## 2024-01-06 PROCEDURE — 63710000001 IBUPROFEN 400 MG TABLET: Performed by: OBSTETRICS & GYNECOLOGY

## 2024-01-06 PROCEDURE — 63710000001 ROSUVASTATIN 5 MG TABLET: Performed by: OBSTETRICS & GYNECOLOGY

## 2024-01-06 PROCEDURE — 25010000002 ENOXAPARIN PER 10 MG: Performed by: OBSTETRICS & GYNECOLOGY

## 2024-01-06 PROCEDURE — 63710000001 DOCUSATE SODIUM 100 MG CAPSULE: Performed by: OBSTETRICS & GYNECOLOGY

## 2024-01-06 RX ORDER — ACETAMINOPHEN AND CODEINE PHOSPHATE 300; 30 MG/1; MG/1
1 TABLET ORAL EVERY 4 HOURS PRN
Qty: 15 TABLET | Refills: 0 | Status: SHIPPED | OUTPATIENT
Start: 2024-01-06 | End: 2024-01-10

## 2024-01-06 RX ORDER — ACETAMINOPHEN AND CODEINE PHOSPHATE 300; 30 MG/1; MG/1
1-2 TABLET ORAL EVERY 6 HOURS PRN
Qty: 10 TABLET | Refills: 0 | Status: SHIPPED | OUTPATIENT
Start: 2024-01-06 | End: 2025-01-05

## 2024-01-06 RX ORDER — NALOXONE HYDROCHLORIDE 4 MG/.1ML
SPRAY NASAL
Qty: 2 EACH | Refills: 0 | Status: SHIPPED | OUTPATIENT
Start: 2024-01-06

## 2024-01-06 RX ADMIN — ENOXAPARIN SODIUM 40 MG: 100 INJECTION SUBCUTANEOUS at 08:31

## 2024-01-06 RX ADMIN — ROSUVASTATIN CALCIUM 5 MG: 5 TABLET, FILM COATED ORAL at 08:31

## 2024-01-06 RX ADMIN — IBUPROFEN 400 MG: 400 TABLET, FILM COATED ORAL at 08:30

## 2024-01-06 RX ADMIN — DOCUSATE SODIUM 100 MG: 100 CAPSULE, LIQUID FILLED ORAL at 08:30

## 2024-01-06 RX ADMIN — ASPIRIN 81 MG: 81 TABLET, COATED ORAL at 08:30

## 2024-01-06 RX ADMIN — IBUPROFEN 400 MG: 400 TABLET, FILM COATED ORAL at 00:25

## 2024-01-06 RX ADMIN — IBUPROFEN 400 MG: 400 TABLET, FILM COATED ORAL at 04:52

## 2024-01-06 RX ADMIN — AMLODIPINE BESYLATE 2.5 MG: 2.5 TABLET ORAL at 08:31

## 2024-01-06 NOTE — CASE MANAGEMENT/SOCIAL WORK
Case Management Discharge Note      Final Note: dc home         Selected Continued Care - Discharged on 1/6/2024 Admission date: 1/3/2024 - Discharge disposition: Home or Self Care      Destination    No services have been selected for the patient.                Durable Medical Equipment    No services have been selected for the patient.                Dialysis/Infusion    No services have been selected for the patient.                Home Medical Care    No services have been selected for the patient.                Therapy    No services have been selected for the patient.                Community Resources    No services have been selected for the patient.                Community & DME    No services have been selected for the patient.                         Final Discharge Disposition Code: 01 - home or self-care

## 2024-01-06 NOTE — OUTREACH NOTE
Prep Survey      Flowsheet Row Responses   [facility] patient discharged from? [facility]   Is LACE score < 7 ? No   Eligibility [facility]   Date of Admission 01/03/24   Date of Discharge 01/06/24   Discharge Disposition Home or Self Care   Discharge diagnosis LAPAROSCOPIC UTEROSACRAL SUSPENSION /SACRAL COLPOPEXY,  LAPAROSCOPIC PARAVAGINAL REPAIR,  LAPAROSCOPIC/ABDOMINAL ENTEROCELE REPAIR,  CYSTOURETHROSCOPY   Does the patient have one of the following disease processes/diagnoses(primary or secondary)? General Surgery   Does the patient have Home health ordered? No   Is there a DME ordered? No   Prep survey completed? Yes            Debra YO - Registered Nurse

## 2024-01-06 NOTE — PLAN OF CARE
Goal Outcome Evaluation:  Plan of Care Reviewed With: patient        Progress: improving  Outcome Evaluation: Pt has been voiding good amts all shift. Will attempt bladder scan check again this am . Lap sites intact with bandaids. Pt is forgetful. Bed alarm for safety. Up with assist to bathroom and to walk in halls. Afebrile and VS stable. Medicated with scheduled advil but denies need for narcotic pain meds.    Voided 100 more cc this am and bladder scan was 80cc.

## 2024-01-06 NOTE — NURSING NOTE
Patient and  were given discharge instructions.  Encouraged patient to take ibuprofen/tylenol for her pain at home.  She received her prescription from the retail pharmacy prior to leaving.  Educated on narcotics and side effects, and encouraged her to take for severe pain only.  All questions answered.  Patient ready to go home.

## 2024-01-06 NOTE — DISCHARGE SUMMARY
Physician Discharge Summary  Patient Identification:  Name: Marcia Ulloa  Age: 78 y.o.  Sex: female  :  1945  MRN: [id number]    Admit date: 1/3/2024    Discharge date and time: 2024    Admitting Physician: Senia Frederick MD    Discharge Physician: Senia Frederick MD    Admission Diagnoses: Vaginal vault prolapse [N81.9]    Hospital Problems:   Principal Problem:    Vaginal vault prolapse  Active Problems:    Cystocele, midline    Cystocele, lateral    Loss of perineal body, female    Incompetence or weakening of rectovaginal tissue    Vaginal enterocele    Rectocele    Loss of perineal body, female      Discharge Diagnoses: same    Discharged Condition: good    Hospital Course:   surgery and post operative care  Ambulating   Tolerating diet  Passed flatus  Passed voiding trial this morning  VOID 100  PVR  80    Discharge Exam:  Abdomen soft nontender  suprapubic incisions clean dry  Bandages intact      Disposition:  Home  Patient is not to take Percocet (oxycodone)as has had hallucinations with this in past    Will send in to Meds to Beds Tylenol 3#  She has all other meds already      Signed:  Senia Frederick MD  2024

## 2024-01-08 ENCOUNTER — TRANSITIONAL CARE MANAGEMENT TELEPHONE ENCOUNTER (OUTPATIENT)
Dept: CALL CENTER | Facility: HOSPITAL | Age: 79
End: 2024-01-08
Payer: MEDICARE

## 2024-01-08 NOTE — OUTREACH NOTE
Call Center TCM Note      Flowsheet Row Responses   [facility] patient discharged from? [place]   Does the patient have one of the following disease processes/diagnoses(primary or secondary)? General Surgery   TCM attempt successful? Yes  [Spouse]   Call start time 1233   Call end time 1235   Discharge diagnosis LAPAROSCOPIC UTEROSACRAL SUSPENSION /SACRAL COLPOPEXY,  LAPAROSCOPIC PARAVAGINAL REPAIR,  LAPAROSCOPIC/ABDOMINAL ENTEROCELE REPAIR,  CYSTOURETHROSCOPY   Meds reviewed with patient/caregiver? Yes   Is the patient having any side effects they believe may be caused by any medication additions or changes? No   Does the patient have all medications related to this admission filled (includes all antibiotics, pain medications, etc.) Yes   Is the patient taking all medications as directed (includes completed medication regime)? Yes   Comments Pt states will call later today to make appt.   Does the patient have an appointment with their PCP within 7-14 days of discharge? No   Nursing Interventions Patient declined scheduling/rescheduling appointment at this time   Has home health visited the patient within 72 hours of discharge? N/A   Psychosocial issues? No   Did the patient receive a copy of their discharge instructions? Yes   Nursing interventions Reviewed instructions with patient   What is the patient's perception of their health status since discharge? Improving   Is the patient/caregiver able to teach back signs and symptoms of incisional infection? Increased redness, swelling or pain at the incisonal site, Increased drainage or bleeding, Incisional warmth, Pus or odor from incision, Fever   Is the patient/caregiver able to teach back steps to recovery at home? Set small, achievable goals for return to baseline health, Rest and rebuild strength, gradually increase activity, Eat a well-balance diet   TCM call completed? Yes   Call end time 1235            Alessia Zapata RN    1/8/2024, 12:36  EST

## 2024-01-14 NOTE — OP NOTE
[facility] MAIN OR     OPERATIVE REPORT     Patient:    Marcia Ulloa         :    1945     Date of Operations:     1/3/2024     PREOPERATIVE DIAGNOSES:     Vaginal vault prolapse N99.3    Cystocele with prolapse N81.11, N81.12    Vaginal enterocele N81.5    Rectocele N81.6    Loss of perineal body, N81.89      POSTOPERATIVE DIAGNOSES:      SURGEON: Senia Frederick M.D., MSc, FACOG, FACS     ASSISTANT: Dorcas Metz CST                             PROCEDURE(S) PERFORMED:   Laparoscopic uterosacral ligament colpopexy, 24347  Laparoscopic paravaginal repair, 09010  Laparoscopic abdominal enterocele repair, 09944  Cystourethroscopy                 FINDING(S): On cystourethroscopy following all the procedures, there was bilateral efflux of Pyridium stained urine from both the right and the left ureteral orifices. There were no lesions or foreign material of the bladder or the urethra. The uterus and cervix,  and right and left fallopian tubes were surgically absent. The ovaries were normal and remained in situ. There was an erosion from prior pessary on the posterior vaginal wall.      SPECIMENS: suture material     DESCRIPTION OF PROCEDURE(S): The patient was taken to the Operating Room with a peripheral IV in place. She underwent the induction of general endotracheal anesthesia, was prepped and draped in the dorsal lithotomy position in [facility].   Cystourethroscopy showed no lesions or foreign material of the bladder or the urethra and there was bilateral efflux of Pyridium stained urine from both the right and the left ureteral orifices. The cystoscope was removed and a Briscoe catheter was placed and set   to straight drainage.  A left upper quadrant skin incision was made, a Veress needle inserted, and a pneumoperitoneum introduced. The Veress needle was removed and a 5 ky-meter Opti-view was placed in the left upper quadrant. Under direct visualization, an 11 ky-meter left  lateral and 5 ky-meter suprapubic, sub-umbilical, and right lateral ports were placed. The patient was placed in Trendelenburg and the lysis of intestinal adhesions was accomplished as described in findings. With a Brieske retractor in the vagina, the posterior rectovaginal serosa was dissected from the rectovaginal fascia and with a Lucite dilator in the vagina, the pubo-cervical serosa was dissected from the pubo-cervical fascia. The posterior and apical vagina was attenuated and was sutured so that there was adequate tissue integrity to perform the operations. There was suture material in the posterior vagina which was removed and sent to Pathology. With a Brieske retractor in the vagina, the paravaginal tissue on the left and on the right was reattached with #1 Prolene and the uterosacral ligaments were attached to the paravaginal tissue ipsilaterally bilaterally to accomplish the laparoscopic bilateral paravaginal repair. With a Breiske retractor in the vagina, the uterosacral ligaments were placed on stretch and #1 Prolene was used to go through the right uterosacral ligament, the right rectovaginal fascia, the right pubo-cervical fascia and held. #1 Prolene was used to go through the left uterosacral ligament, the left rectovaginal fascia, the left pubocervical fascia and both these sutures were tied accomplishing the laparoscopic uterosacral ligament colpopexy.  #1 Prolene was used through the abdominal approach of a laparoscopic enterocele repair attaching the superior most portion of the detached rectovaginal fascia and the pubo-cervical fascia at the apex of the vagina in the midline to the right and the left uterosacral ligaments and these sutures were tied laparoscopically. Cystourethroscopy showed no lesions or foreign material of the bladder or the urethra and there was bilateral efflux of Pyridium stained urine from both the right and left ureteral orifices. The cystoscope was removed and a Briscoe  catheter was placed and set to straight drainage. The CO2 gas was allowed to escape and the left 11 yk-meter port was closed with Ras Reeder Sure Close with 0 Vicryl. The remainder of the ports were removed under direct visualization and all were hemostatic and these were closed with 4-0 Vicryl. The incision was closed with 2-0 Vicryl suture. A vaginal pack was placed into the vagina. The patient was taken out of the dorsal lithotomy position, awakened from anesthesia and taken to the Recovery Room in good condition.  There were no complications to the procedures.  All final needle, sponge, and instrument counts were reported as correct.         ESTIMATED BLODD LOSS:   250 mL     URINE OUTPUT: 200 mL     FLUIDS:   2700 mL         MORA GODOY MD, MSc, FACOG, FACS

## 2024-04-12 DIAGNOSIS — E78.49 OTHER HYPERLIPIDEMIA: ICD-10-CM

## 2024-04-12 RX ORDER — ROSUVASTATIN CALCIUM 5 MG/1
5 TABLET, COATED ORAL DAILY
Qty: 90 TABLET | Refills: 3 | Status: SHIPPED | OUTPATIENT
Start: 2024-04-12

## 2024-05-01 ENCOUNTER — OFFICE VISIT (OUTPATIENT)
Dept: INTERNAL MEDICINE | Facility: CLINIC | Age: 79
End: 2024-05-01
Payer: MEDICARE

## 2024-05-01 VITALS
WEIGHT: 141 LBS | SYSTOLIC BLOOD PRESSURE: 132 MMHG | DIASTOLIC BLOOD PRESSURE: 80 MMHG | BODY MASS INDEX: 23.49 KG/M2 | OXYGEN SATURATION: 98 % | RESPIRATION RATE: 16 BRPM | HEIGHT: 65 IN | HEART RATE: 75 BPM | TEMPERATURE: 94.8 F

## 2024-05-01 DIAGNOSIS — I10 ESSENTIAL HYPERTENSION: ICD-10-CM

## 2024-05-01 DIAGNOSIS — E78.49 OTHER HYPERLIPIDEMIA: ICD-10-CM

## 2024-05-01 DIAGNOSIS — E78.49 OTHER HYPERLIPIDEMIA: Primary | ICD-10-CM

## 2024-05-01 RX ORDER — AMLODIPINE BESYLATE 5 MG/1
2.5 TABLET ORAL DAILY
Qty: 90 TABLET | Refills: 2 | Status: SHIPPED | OUTPATIENT
Start: 2024-05-01

## 2024-05-01 RX ORDER — AMLODIPINE BESYLATE 5 MG/1
2.5 TABLET ORAL DAILY
Qty: 90 TABLET | Refills: 2 | Status: SHIPPED | OUTPATIENT
Start: 2024-05-01 | End: 2024-05-01 | Stop reason: SDUPTHER

## 2024-05-01 RX ORDER — ROSUVASTATIN CALCIUM 5 MG/1
5 TABLET, COATED ORAL DAILY
Qty: 90 TABLET | Refills: 3 | Status: SHIPPED | OUTPATIENT
Start: 2024-05-01 | End: 2024-05-01 | Stop reason: SDUPTHER

## 2024-05-01 RX ORDER — BENAZEPRIL HYDROCHLORIDE 5 MG/1
5 TABLET ORAL DAILY
Qty: 90 TABLET | Refills: 3 | Status: SHIPPED | OUTPATIENT
Start: 2024-05-01

## 2024-05-01 RX ORDER — ROSUVASTATIN CALCIUM 5 MG/1
5 TABLET, COATED ORAL DAILY
Qty: 90 TABLET | Refills: 3 | Status: SHIPPED | OUTPATIENT
Start: 2024-05-01

## 2024-05-02 LAB
ALBUMIN SERPL-MCNC: 4.4 G/DL (ref 3.5–5.2)
ALBUMIN/GLOB SERPL: 1.7 G/DL
ALP SERPL-CCNC: 110 U/L (ref 39–117)
ALT SERPL-CCNC: 12 U/L (ref 1–33)
AST SERPL-CCNC: 14 U/L (ref 1–32)
BASOPHILS # BLD AUTO: 0.11 10*3/MM3 (ref 0–0.2)
BASOPHILS NFR BLD AUTO: 1.3 % (ref 0–1.5)
BILIRUB SERPL-MCNC: 0.4 MG/DL (ref 0–1.2)
BUN SERPL-MCNC: 19 MG/DL (ref 8–23)
BUN/CREAT SERPL: 17 (ref 7–25)
CALCIUM SERPL-MCNC: 10.1 MG/DL (ref 8.6–10.5)
CHLORIDE SERPL-SCNC: 107 MMOL/L (ref 98–107)
CHOLEST SERPL-MCNC: 150 MG/DL (ref 0–200)
CO2 SERPL-SCNC: 29 MMOL/L (ref 22–29)
CREAT SERPL-MCNC: 1.12 MG/DL (ref 0.57–1)
EGFRCR SERPLBLD CKD-EPI 2021: 50.4 ML/MIN/1.73
EOSINOPHIL # BLD AUTO: 0.4 10*3/MM3 (ref 0–0.4)
EOSINOPHIL NFR BLD AUTO: 4.9 % (ref 0.3–6.2)
ERYTHROCYTE [DISTWIDTH] IN BLOOD BY AUTOMATED COUNT: 13.7 % (ref 12.3–15.4)
GLOBULIN SER CALC-MCNC: 2.6 GM/DL
GLUCOSE SERPL-MCNC: 84 MG/DL (ref 65–99)
HCT VFR BLD AUTO: 40.4 % (ref 34–46.6)
HDLC SERPL-MCNC: 58 MG/DL (ref 40–60)
HGB BLD-MCNC: 13 G/DL (ref 12–15.9)
IMM GRANULOCYTES # BLD AUTO: 0.02 10*3/MM3 (ref 0–0.05)
IMM GRANULOCYTES NFR BLD AUTO: 0.2 % (ref 0–0.5)
LDLC SERPL CALC-MCNC: 77 MG/DL (ref 0–100)
LDLC/HDLC SERPL: 1.31 {RATIO}
LYMPHOCYTES # BLD AUTO: 1.35 10*3/MM3 (ref 0.7–3.1)
LYMPHOCYTES NFR BLD AUTO: 16.4 % (ref 19.6–45.3)
MCH RBC QN AUTO: 30.2 PG (ref 26.6–33)
MCHC RBC AUTO-ENTMCNC: 32.2 G/DL (ref 31.5–35.7)
MCV RBC AUTO: 93.7 FL (ref 79–97)
MONOCYTES # BLD AUTO: 0.79 10*3/MM3 (ref 0.1–0.9)
MONOCYTES NFR BLD AUTO: 9.6 % (ref 5–12)
NEUTROPHILS # BLD AUTO: 5.54 10*3/MM3 (ref 1.7–7)
NEUTROPHILS NFR BLD AUTO: 67.6 % (ref 42.7–76)
NRBC BLD AUTO-RTO: 0 /100 WBC (ref 0–0.2)
PLATELET # BLD AUTO: 276 10*3/MM3 (ref 140–450)
POTASSIUM SERPL-SCNC: 4.4 MMOL/L (ref 3.5–5.2)
PROT SERPL-MCNC: 7 G/DL (ref 6–8.5)
RBC # BLD AUTO: 4.31 10*6/MM3 (ref 3.77–5.28)
SODIUM SERPL-SCNC: 144 MMOL/L (ref 136–145)
TRIGL SERPL-MCNC: 81 MG/DL (ref 0–150)
VLDLC SERPL CALC-MCNC: 15 MG/DL (ref 5–40)
WBC # BLD AUTO: 8.21 10*3/MM3 (ref 3.4–10.8)

## 2024-05-05 NOTE — PROGRESS NOTES
"Chief Complaint  Follow-up    Subjective          Marcia Ulloa presents to [facility]  History of Present Illness  The patient is a 79-year-old female who presents for evaluation of multiple medical concerns. She is accompanied by her .    The patient, who previously engaged in water aerobics in [place], had to discontinue these activities due to persistent knee pain. Currently, she reports discomfort in one knee, while the other is experiencing minor discomfort. She has scheduled an appointment with a knee specialist, Dr. Garcia, in the coming weeks for further evaluation.    The patient's  reports that during a visit to her primary care physician in [place], the patient's blood pressure was elevated. She is currently on a regimen of amlodipine 2.5 mg daily and benazepril, and expresses a desire to discuss these medication changes and determine the next course of action.    Objective   Vital Signs:   /80   Pulse 75   Temp 94.8 °F (34.9 °C) (Temporal)   Resp 16   Ht 165.1 cm (65\")   Wt 64 kg (141 lb)   SpO2 98%   BMI 23.46 kg/m²     Physical Exam  Vitals and nursing note reviewed.   Constitutional:       Appearance: She is well-developed.   HENT:      Head: Normocephalic and atraumatic.   Musculoskeletal:      Cervical back: Normal range of motion and neck supple.   Neurological:      Mental Status: She is alert and oriented to person, place, and time.   Psychiatric:         Behavior: Behavior normal.         Physical Exam  Vital Signs  The patient's blood pressure is 132/80.     Result Review :                 Assessment and Plan    Diagnoses and all orders for this visit:    1. Other hyperlipidemia (Primary)  -     rosuvastatin (CRESTOR) 5 MG tablet; Take 1 tablet by mouth Daily.  Dispense: 90 tablet; Refill: 3  -     Lipid Panel With LDL / HDL Ratio    2. Essential hypertension  -     amLODIPine (NORVASC) 5 MG tablet; Take 0.5 tablets by mouth Daily.  " Dispense: 90 tablet; Refill: 2  -     benazepril (LOTENSIN) 5 MG tablet; Take 1 tablet by mouth Daily.  Dispense: 90 tablet; Refill: 3  -     Comprehensive Metabolic Panel  -     CBC & Differential      Assessment & Plan  1. Hypertension.  The patient's blood pressure readings were elevated during her 4-month stay in [place]. Her primary care physician prescribed benazepril 5 mg daily, which she has been adhering to. Additionally, she is on amlodipine 2.5 mg daily. A comprehensive year's prescription for amlodipine and benazepril has been issued.    2. Hypercholesterolemia.  The patient has a documented history of hypercholesterolemia and is currently on Crestor 5 mg daily without any reported side effects. Laboratory tests will be conducted today.    Follow Up   No follow-ups on file.  Patient was given instructions and counseling regarding her condition or for health maintenance advice. Please see specific information pulled into the AVS if appropriate.           Patient or patient representative verbalized consent for the use of Ambient Listening during the visit with  Bk Mendez MD for chart documentation. 5/5/2024  10:33 EDT

## 2024-05-06 NOTE — PROGRESS NOTES
Please inform the patient of the following abnormal results. Serum creatnine is slightly elevated, she needs to drink plenty of water. Will recheck at next visit.

## 2024-08-16 ENCOUNTER — TRANSCRIBE ORDERS (OUTPATIENT)
Dept: ADMINISTRATIVE | Facility: HOSPITAL | Age: 79
End: 2024-08-16
Payer: MEDICARE

## 2024-08-16 DIAGNOSIS — Z12.31 ENCOUNTER FOR SCREENING MAMMOGRAM FOR BREAST CANCER: Primary | ICD-10-CM

## 2024-08-26 ENCOUNTER — HOSPITAL ENCOUNTER (OUTPATIENT)
Dept: MAMMOGRAPHY | Facility: HOSPITAL | Age: 79
Discharge: HOME | End: 2024-08-26
Admitting: FAMILY MEDICINE
Payer: MEDICARE

## 2024-08-26 DIAGNOSIS — Z12.31 ENCOUNTER FOR SCREENING MAMMOGRAM FOR BREAST CANCER: ICD-10-CM

## 2024-08-26 PROCEDURE — 77063 BREAST TOMOSYNTHESIS BI: CPT

## 2024-08-26 PROCEDURE — 77067 SCR MAMMO BI INCL CAD: CPT

## 2024-08-28 NOTE — PROGRESS NOTES
CONCLUSION: Benign bilateral mammogram as described above showing no  change from 08/15/2023 or 08/11/2022.    BI-RADS CATEGORY 2: Benign findings.

## 2024-09-03 ENCOUNTER — OFFICE VISIT (OUTPATIENT)
Dept: INTERNAL MEDICINE | Facility: CLINIC | Age: 79
End: 2024-09-03
Payer: MEDICARE

## 2024-09-03 VITALS
WEIGHT: 148.6 LBS | RESPIRATION RATE: 17 BRPM | SYSTOLIC BLOOD PRESSURE: 144 MMHG | BODY MASS INDEX: 24.76 KG/M2 | TEMPERATURE: 98.9 F | OXYGEN SATURATION: 93 % | DIASTOLIC BLOOD PRESSURE: 80 MMHG | HEART RATE: 71 BPM | HEIGHT: 65 IN

## 2024-09-03 DIAGNOSIS — Z00.00 HEALTHCARE MAINTENANCE: ICD-10-CM

## 2024-09-03 DIAGNOSIS — Z00.00 WELL WOMAN EXAM (NO GYNECOLOGICAL EXAM): Primary | ICD-10-CM

## 2024-09-03 DIAGNOSIS — E78.49 OTHER HYPERLIPIDEMIA: ICD-10-CM

## 2024-09-03 DIAGNOSIS — Z12.11 ENCOUNTER FOR SCREENING COLONOSCOPY: ICD-10-CM

## 2024-09-03 DIAGNOSIS — I10 ESSENTIAL HYPERTENSION: ICD-10-CM

## 2024-09-03 DIAGNOSIS — Z78.0 ASYMPTOMATIC POSTMENOPAUSAL STATUS: ICD-10-CM

## 2024-09-03 DIAGNOSIS — Z00.00 MEDICARE ANNUAL WELLNESS VISIT, SUBSEQUENT: ICD-10-CM

## 2024-09-03 RX ORDER — AMLODIPINE BESYLATE 5 MG/1
2.5 TABLET ORAL DAILY
Qty: 90 TABLET | Refills: 2 | Status: SHIPPED | OUTPATIENT
Start: 2024-09-03

## 2024-09-03 RX ORDER — ROSUVASTATIN CALCIUM 5 MG/1
5 TABLET, COATED ORAL DAILY
Qty: 90 TABLET | Refills: 3 | Status: SHIPPED | OUTPATIENT
Start: 2024-09-03

## 2024-09-03 RX ORDER — ASPIRIN 81 MG/1
81 TABLET ORAL DAILY
Qty: 90 TABLET | Refills: 3 | Status: SHIPPED | OUTPATIENT
Start: 2024-09-03

## 2024-09-03 RX ORDER — BENAZEPRIL HYDROCHLORIDE 5 MG/1
5 TABLET ORAL DAILY
Qty: 90 TABLET | Refills: 3 | Status: SHIPPED | OUTPATIENT
Start: 2024-09-03

## 2024-09-03 RX ORDER — DONEPEZIL HYDROCHLORIDE 10 MG/1
1 TABLET, FILM COATED ORAL DAILY
COMMUNITY
Start: 2024-08-09

## 2024-09-03 NOTE — PROGRESS NOTES
Subjective   The ABCs of the Annual Wellness Visit  Medicare Wellness Visit      Marcia Ulloa is a 79 y.o. patient who presents for a Medicare Wellness Visit.    The following portions of the patient's history were reviewed and   updated as appropriate: allergies, current medications, past family history, past medical history, past social history, past surgical history, and problem list.    Compared to one year ago, the patient's physical   health is the same.  Compared to one year ago, the patient's mental   health is better.    Recent Hospitalizations:  She was not admitted to the hospital during the last year.     Current Medical Providers:  Patient Care Team:  Bk Mendez MD as PCP - General (Family Medicine)  Senia Frederick MD as Consulting Physician (Obstetrics and Gynecology)  Thaddeus Stafford MD as Consulting Physician (Orthopedic Surgery)  Elayne Paulino MD (Inactive) as Consulting Physician (Gynecology)  Hunter Benito DC (Chiropractic Medicine)  Shahrzad Maza MD as Consulting Physician (Ophthalmology)  Kostas Loomis MD (Dermatology)    Outpatient Medications Prior to Visit   Medication Sig Dispense Refill    acetaminophen (TYLENOL) 325 MG tablet Take 1 tablet by mouth Every 6 (Six) Hours As Needed.      Calcium Carbonate-Vitamin D (CALCIUM-CARB 600 + D PO) Take 1 tablet by mouth Daily. HOLD PRIOR TO SURG      CRANBERRY PO Take 1 tablet by mouth Daily. HOLD PRIOR TO SURG      donepezil (ARICEPT) 10 MG tablet Take 1 tablet by mouth Daily.      Multiple Vitamin (MULTI VITAMIN DAILY PO) Take 1 tablet by mouth Daily. HOLD PRIOR TO SURG      Probiotic Product (PROBIOTIC PO) Take  by mouth. HOLD PRIOR TO SURG      TURMERIC PO Take 1 tablet by mouth Daily. HOLD PRIOR TO SURG      amLODIPine (NORVASC) 5 MG tablet Take 0.5 tablets by mouth Daily. 90 tablet 2    aspirin 81 MG EC tablet Take 1 tablet by mouth Daily. INSTRUCTED PT TO FOLLOW MD INSTRUCTIONS REGARDING HOLDING FOR SURGERY       benazepril (LOTENSIN) 5 MG tablet Take 1 tablet by mouth Daily. 90 tablet 3    donepezil (ARICEPT) 5 MG tablet Take 1 tablet by mouth Every Night.      rosuvastatin (CRESTOR) 5 MG tablet Take 1 tablet by mouth Daily. 90 tablet 3    acetaminophen-codeine (TYLENOL with CODEINE #3) 300-30 MG per tablet Take 1-2 tablets by mouth Every 6 (Six) Hours As Needed for Moderate Pain or Severe Pain. 10 tablet 0    albuterol sulfate  (90 Base) MCG/ACT inhaler INHALE 2 PUFFS EVERY 4 HOURS AS NEEDED FOR WHEEZING OR SHORTNESS OF AIR 18 g 2    melatonin 3 MG tablet Take 1 tablet by mouth At Night As Needed for Sleep.      naloxone (NARCAN) 4 MG/0.1ML nasal spray Call 911. Don't prime. [place] in 1 nostril for overdose. Repeat in 2-3 minutes in other nostril if no or minimal breathing/responsiveness. 2 each 0     No facility-administered medications prior to visit.     Opioid medication/s are on active medication list.  and I have evaluated her active treatment plan and pain score trends (see table).  Vitals:    09/03/24 1301   PainSc: 0-No pain     I have reviewed the chart for potential of high risk medication and harmful drug interactions in the elderly.        Aspirin is on active medication list. Aspirin use is indicated based on review of current medical condition/s. Pros and cons of this therapy have been discussed today. Benefits of this medication outweigh potential harm.  Patient has been encouraged to continue taking this medication.  .      Patient Active Problem List   Diagnosis    Essential hypertension    Other hyperlipidemia    Primary osteoarthritis of knee    Elevated serum creatinine    Acquired absence of breast and absent nipple    Asymptomatic postmenopausal status    Breast cancer, right breast    Osteoarthritis of hip    Personal history of breast cancer    Uterovaginal prolapse, incomplete    Cystocele, midline    Cystocele, lateral    Vaginal enterocele, congenital or acquired    Loss of perineal  "body, female    Female stress incontinence    Incomplete defecation    Incompetence or weakening of pubocervical tissue    Incompetence or weakening of rectovaginal tissue    Personal history of endometriosis    Recurrent syncope    Dyslipidemia    Stage 3 chronic kidney disease    Acute UTI (urinary tract infection)    C. difficile colitis    Essential hypertension    Sepsis without acute organ dysfunction    Acute urinary retention    Vaginal vault prolapse    Vaginal enterocele    Rectocele    Loss of perineal body, female     Advance Care Planning Advance Directive is on file.  ACP discussion was held with the patient during this visit. Patient has an advance directive in EMR which is still valid.             Objective   Vitals:    09/03/24 1301   BP: 144/80   BP Location: Left arm   Patient Position: Sitting   Cuff Size: Adult   Pulse: 71   Resp: 17   Temp: 98.9 °F (37.2 °C)   TempSrc: Temporal   SpO2: 93%   Weight: 67.4 kg (148 lb 9.6 oz)   Height: 165.1 cm (65\")   PainSc: 0-No pain       Estimated body mass index is 24.73 kg/m² as calculated from the following:    Height as of this encounter: 165.1 cm (65\").    Weight as of this encounter: 67.4 kg (148 lb 9.6 oz).    BMI is within normal parameters. No other follow-up for BMI required.       Does the patient have evidence of cognitive impairment? Yes                                                                                               Health  Risk Assessment    Smoking Status:  Social History     Tobacco Use   Smoking Status Never   Smokeless Tobacco Never     Alcohol Consumption:  Social History     Substance and Sexual Activity   Alcohol Use Not Currently    Alcohol/week: 2.0 standard drinks of alcohol    Types: 1 Glasses of wine, 1 Drinks containing 0.5 oz of alcohol per week    Comment: [place]       Fall Risk Screen  STEADI Fall Risk Assessment was completed, and patient is at LOW risk for falls.Assessment completed on:9/3/2024    Depression " Screenin/3/2024     1:08 PM   PHQ-2/PHQ-9 Depression Screening   Little Interest or Pleasure in Doing Things 0-->not at all   Feeling Down, Depressed or Hopeless 0-->not at all   PHQ-9: Brief Depression Severity Measure Score 0     Health Habits and Functional and Cognitive Screenin/3/2024     1:00 PM   Functional & Cognitive Status   Do you have difficulty preparing food and eating? No   Do you have difficulty bathing yourself, getting dressed or grooming yourself? No   Do you have difficulty using the toilet? No   Do you have difficulty moving around from place to place? No   Do you have trouble with steps or getting out of a bed or a chair? No   Current Diet Well Balanced Diet   Dental Exam Up to date   Eye Exam Not up to date   Exercise (times per week) 1 times per week   Current Exercises Include No Regular Exercise   Do you need help using the phone?  No   Are you deaf or do you have serious difficulty hearing?  No   Do you need help to go to places out of walking distance? No   Do you need help shopping? No   Do you need help preparing meals?  No   Do you need help with housework?  Yes   Do you need help with laundry? No   Do you need help taking your medications? No   Do you need help managing money? Yes   Do you ever drive or ride in a car without wearing a seat belt? No   Have you felt unusual stress, anger or loneliness in the last month? No   Who do you live with? Spouse   If you need help, do you have trouble finding someone available to you? No   Have you been bothered in the last four weeks by sexual problems? No   Do you have difficulty concentrating, remembering or making decisions? No           Age-appropriate Screening Schedule:  Refer to the list below for future screening recommendations based on patient's age, sex and/or medical conditions. Orders for these recommended tests are listed in the plan section. The patient has been provided with a written plan.    Health  Maintenance List  Health Maintenance   Topic Date Due    DXA SCAN  08/13/2023    COLORECTAL CANCER SCREENING  08/12/2024    COVID-19 Vaccine (7 - 2023-24 season) 11/23/2024 (Originally 9/1/2024)    LIPID PANEL  05/01/2025    ANNUAL WELLNESS VISIT  09/03/2025    MAMMOGRAM  08/26/2026    TDAP/TD VACCINES (3 - Td or Tdap) 01/02/2029    HEPATITIS C SCREENING  Completed    RSV Vaccine - Adults  Completed    INFLUENZA VACCINE  Completed    Pneumococcal Vaccine 65+  Completed    ZOSTER VACCINE  Completed                                                                                                                                                CMS Preventative Services Quick Reference  Risk Factors Identified During Encounter  None Identified    The above risks/problems have been discussed with the patient.  Pertinent information has been shared with the patient in the After Visit Summary.  An After Visit Summary and PPPS were made available to the patient.    Follow Up:   Next Medicare Wellness visit to be scheduled in 1 year.     Assessment & Plan  Well woman exam (no gynecological exam)    Healthcare maintenance    Medicare annual wellness visit, subsequent    Essential hypertension    Other hyperlipidemia     Encounter for screening colonoscopy    Asymptomatic postmenopausal status    Orders Placed This Encounter   Procedures    DEXA Bone Density Axial    Ambulatory Referral For Screening Colonoscopy     New Medications Ordered This Visit   Medications    benazepril (LOTENSIN) 5 MG tablet     Sig: Take 1 tablet by mouth Daily.     Dispense:  90 tablet     Refill:  3    aspirin 81 MG EC tablet     Sig: Take 1 tablet by mouth Daily. INSTRUCTED PT TO FOLLOW MD INSTRUCTIONS REGARDING HOLDING FOR SURGERY     Dispense:  90 tablet     Refill:  3    amLODIPine (NORVASC) 5 MG tablet     Sig: Take 0.5 tablets by mouth Daily.     Dispense:  90 tablet     Refill:  2    rosuvastatin (CRESTOR) 5 MG tablet     Sig: Take 1 tablet by  mouth Daily.     Dispense:  90 tablet     Refill:  3          Follow Up:   No follow-ups on file.

## 2024-09-07 NOTE — PROGRESS NOTES
Subjective   Marcia Ulloa is a 79 y.o. female and is here for a comprehensive physical exam. The patient reports no problems.    Pt is UTD with annual gyn exam and mammo           Social History:   Social History     Socioeconomic History    Marital status:    Tobacco Use    Smoking status: Never    Smokeless tobacco: Never   Vaping Use    Vaping status: Never Used   Substance and Sexual Activity    Alcohol use: Not Currently     Alcohol/week: 2.0 standard drinks of alcohol     Types: 1 Glasses of wine, 1 Drinks containing 0.5 oz of alcohol per week     Comment: [place]    Drug use: No    Sexual activity: Yes     Partners: Male     Birth control/protection: Post-menopausal     Comment: Thanks for helping me       Family History:   Family History   Problem Relation Age of Onset    COPD Mother     Heart failure Mother     Hypertension Mother     Heart disease Mother     Depression Mother     Vision loss Mother     Anxiety disorder Mother     Heart failure Father     Hypertension Father     Heart disease Father         early fifties    COPD Father         Heart condition in his early fifties    Vision loss Maternal Grandfather     Malig Hyperthermia Neg Hx     Breast cancer Neg Hx     Ovarian cancer Neg Hx     Colon cancer Neg Hx     Colon polyps Neg Hx     Crohn's disease Neg Hx     Irritable bowel syndrome Neg Hx     Ulcerative colitis Neg Hx        Past Medical History:   Past Medical History:   Diagnosis Date    Arthritis     Bowel incontinence     Breast cancer 2010    Rt    Clotting disorder in hospital in July with colon bleeding    Colon polyp     Cystocele with rectocele     Drug therapy     femara 5-7 years.    Eczema     History of shingles     HL (hearing loss) somewhat    Hyperlipidemia     Hypertension     Irritable bowel syndrome     Knee pain     Prolapse of female bladder, acquired     Seasonal allergies     Slow to wake up after anesthesia     SOB (shortness of breath) on exertion     Urinary  incontinence     Urinary tract infection        The following portions of the patient's history were reviewed and updated as appropriate: allergies, current medications, past family history, past medical history, past social history, past surgical history and problem list.    Review of Systems    Review of Systems   Constitutional:  Negative for chills and fever.   HENT:  Negative for congestion, rhinorrhea, sinus pain and sore throat.    Eyes:  Negative for photophobia and visual disturbance.   Respiratory:  Negative for cough, chest tightness and shortness of breath.    Cardiovascular:  Negative for chest pain and palpitations.   Gastrointestinal:  Negative for diarrhea, nausea and vomiting.   Genitourinary:  Negative for dysuria, frequency and urgency.   Skin:  Negative for rash and wound.   Neurological:  Negative for dizziness and syncope.   Psychiatric/Behavioral:  Negative for behavioral problems and confusion.        Objective   Physical Exam  Vitals and nursing note reviewed.   Constitutional:       Appearance: She is well-developed.   HENT:      Head: Normocephalic and atraumatic.      Right Ear: External ear normal.      Left Ear: External ear normal.   Cardiovascular:      Rate and Rhythm: Normal rate and regular rhythm.      Heart sounds: Normal heart sounds.   Pulmonary:      Effort: Pulmonary effort is normal. No respiratory distress.      Breath sounds: Normal breath sounds.   Abdominal:      Palpations: Abdomen is soft.      Tenderness: There is no abdominal tenderness. There is no guarding.   Musculoskeletal:         General: Normal range of motion.      Cervical back: Normal range of motion and neck supple.   Lymphadenopathy:      Cervical: No cervical adenopathy.   Skin:     General: Skin is warm.   Neurological:      Mental Status: She is alert and oriented to person, place, and time.   Psychiatric:         Behavior: Behavior normal.         Vitals:    09/03/24 1301   BP: 144/80   Pulse: 71    Resp: 17   Temp: 98.9 °F (37.2 °C)   SpO2: 93%     Body mass index is 24.73 kg/m².      Medications:   Current Outpatient Medications:     acetaminophen (TYLENOL) 325 MG tablet, Take 1 tablet by mouth Every 6 (Six) Hours As Needed., Disp: , Rfl:     amLODIPine (NORVASC) 5 MG tablet, Take 0.5 tablets by mouth Daily., Disp: 90 tablet, Rfl: 2    aspirin 81 MG EC tablet, Take 1 tablet by mouth Daily. INSTRUCTED PT TO FOLLOW MD INSTRUCTIONS REGARDING HOLDING FOR SURGERY, Disp: 90 tablet, Rfl: 3    benazepril (LOTENSIN) 5 MG tablet, Take 1 tablet by mouth Daily., Disp: 90 tablet, Rfl: 3    Calcium Carbonate-Vitamin D (CALCIUM-CARB 600 + D PO), Take 1 tablet by mouth Daily. HOLD PRIOR TO SURG, Disp: , Rfl:     CRANBERRY PO, Take 1 tablet by mouth Daily. HOLD PRIOR TO SURG, Disp: , Rfl:     donepezil (ARICEPT) 10 MG tablet, Take 1 tablet by mouth Daily., Disp: , Rfl:     Multiple Vitamin (MULTI VITAMIN DAILY PO), Take 1 tablet by mouth Daily. HOLD PRIOR TO SURG, Disp: , Rfl:     Probiotic Product (PROBIOTIC PO), Take  by mouth. HOLD PRIOR TO SURG, Disp: , Rfl:     rosuvastatin (CRESTOR) 5 MG tablet, Take 1 tablet by mouth Daily., Disp: 90 tablet, Rfl: 3    TURMERIC PO, Take 1 tablet by mouth Daily. HOLD PRIOR TO SURG, Disp: , Rfl:     acetaminophen-codeine (TYLENOL with CODEINE #3) 300-30 MG per tablet, Take 1-2 tablets by mouth Every 6 (Six) Hours As Needed for Moderate Pain or Severe Pain., Disp: 10 tablet, Rfl: 0    albuterol sulfate  (90 Base) MCG/ACT inhaler, INHALE 2 PUFFS EVERY 4 HOURS AS NEEDED FOR WHEEZING OR SHORTNESS OF AIR, Disp: 18 g, Rfl: 2    melatonin 3 MG tablet, Take 1 tablet by mouth At Night As Needed for Sleep., Disp: , Rfl:     naloxone (NARCAN) 4 MG/0.1ML nasal spray, Call 911. Don't prime. [place] in 1 nostril for overdose. Repeat in 2-3 minutes in other nostril if no or minimal breathing/responsiveness., Disp: 2 each, Rfl: 0       Assessment & Plan   Healthy female exam.      1. Healthcare  Maintenance:  2. Patient Counseling:  --Nutrition: Stressed importance of moderation in sodium/caffeine intake, saturated fat and cholesterol, caloric balance, sufficient intake of fresh fruits, vegetables, fiber, calcium and vit D  --Exercise: Recommended 30 minutes of exercise daily.  --Immunizations reviewed.  --Discussed benefits of screening colonoscopy.    Diagnoses and all orders for this visit:    Well woman exam (no gynecological exam)    Healthcare maintenance    Medicare annual wellness visit, subsequent    Essential hypertension  -     benazepril (LOTENSIN) 5 MG tablet; Take 1 tablet by mouth Daily.  -     amLODIPine (NORVASC) 5 MG tablet; Take 0.5 tablets by mouth Daily.    Other hyperlipidemia  -     rosuvastatin (CRESTOR) 5 MG tablet; Take 1 tablet by mouth Daily.    Encounter for screening colonoscopy  -     Ambulatory Referral For Screening Colonoscopy    Asymptomatic postmenopausal status  -     DEXA Bone Density Axial    Other orders  -     donepezil (ARICEPT) 10 MG tablet; Take 1 tablet by mouth Daily.  -     aspirin 81 MG EC tablet; Take 1 tablet by mouth Daily. INSTRUCTED PT TO FOLLOW MD INSTRUCTIONS REGARDING HOLDING FOR SURGERY        No follow-ups on file.             Dictated utilizing Dragon Voice Recognition Software

## 2024-09-07 NOTE — PROGRESS NOTES
"Chief Complaint  Medicare Wellness-subsequent    Subjective          Marcia Ulloa presents to [facility]  History of Present Illness  The patient is a 79-year-old female who presents for evaluation of multiple medical concerns. She is accompanied by her .    She reports no significant health issues but notes that her blood pressure was slightly elevated during today's visit. She monitors her blood pressure daily at home, which typically ranges from 120s to 130s systolic and 50s to 60s diastolic. Her current medications include benazepril 5 mg and amlodipine 2.5 mg daily for blood pressure management, and she reports no adverse effects from these medications. She has an upcoming appointment with her nephrologist on the 17th.    She also takes baby aspirin daily and Crestor 5 mg for cholesterol management, without any reported issues.    She mentions that she is due for a colonoscopy and plans to schedule it upon her return in October 2024. She confirms that she is up-to-date with her mammogram screenings.    She also experiences dry skin, for which she uses lotion as needed.    Objective   Vital Signs:   /80 (BP Location: Left arm, Patient Position: Sitting, Cuff Size: Adult)   Pulse 71   Temp 98.9 °F (37.2 °C) (Temporal)   Resp 17   Ht 165.1 cm (65\")   Wt 67.4 kg (148 lb 9.6 oz)   SpO2 93%   BMI 24.73 kg/m²     Physical Exam  Vitals and nursing note reviewed.   Constitutional:       Appearance: She is well-developed.   HENT:      Head: Normocephalic and atraumatic.   Musculoskeletal:      Cervical back: Normal range of motion and neck supple.   Neurological:      Mental Status: She is alert and oriented to person, place, and time.   Psychiatric:         Behavior: Behavior normal.         Physical Exam       Result Review :                 Assessment and Plan    Diagnoses and all orders for this visit:    1. Well woman exam (no gynecological exam) (Primary)    2. " Healthcare maintenance    3. Medicare annual wellness visit, subsequent    4. Essential hypertension  Assessment & Plan:      Orders:  -     benazepril (LOTENSIN) 5 MG tablet; Take 1 tablet by mouth Daily.  Dispense: 90 tablet; Refill: 3  -     amLODIPine (NORVASC) 5 MG tablet; Take 0.5 tablets by mouth Daily.  Dispense: 90 tablet; Refill: 2    5. Other hyperlipidemia  Assessment & Plan:       Orders:  -     rosuvastatin (CRESTOR) 5 MG tablet; Take 1 tablet by mouth Daily.  Dispense: 90 tablet; Refill: 3    6. Encounter for screening colonoscopy  -     Ambulatory Referral For Screening Colonoscopy    7. Asymptomatic postmenopausal status  -     DEXA Bone Density Axial    Other orders  -     aspirin 81 MG EC tablet; Take 1 tablet by mouth Daily. INSTRUCTED PT TO FOLLOW MD INSTRUCTIONS REGARDING HOLDING FOR SURGERY  Dispense: 90 tablet; Refill: 3      Assessment & Plan  1. Hypertension.  Her blood pressure readings at home are usually in the 120s or 130s over 50s or 60s, but today's reading was higher, possibly due to excitement. Continued monitoring of her blood pressure is advised. She is currently taking Benazepril 5 mg daily and Amlodipine 2.5 mg daily. No issues with these medications were reported. She is also taking baby aspirin daily.     2. Hyperlipidemia.  She is taking Crestor 5 mg daily for cholesterol management. No issues with this medication were reported.    3. Health Maintenance.  She is due for a colonoscopy and a bone density scan. Orders for both tests will be placed today. She had a recent mammogram which was reported as excellent.        Follow Up   No follow-ups on file.  Patient was given instructions and counseling regarding her condition or for health maintenance advice. Please see specific information pulled into the AVS if appropriate.           Patient or patient representative verbalized consent for the use of Ambient Listening during the visit with  Bk Mendez MD for chart  documentation. 9/7/2024  12:12 EDT

## 2024-09-16 ENCOUNTER — TELEPHONE (OUTPATIENT)
Dept: INTERNAL MEDICINE | Facility: CLINIC | Age: 79
End: 2024-09-16
Payer: MEDICARE

## 2024-09-16 ENCOUNTER — HOSPITAL ENCOUNTER (OUTPATIENT)
Dept: BONE DENSITY | Facility: HOSPITAL | Age: 79
Discharge: HOME | End: 2024-09-16
Admitting: FAMILY MEDICINE
Payer: MEDICARE

## 2024-09-16 PROCEDURE — 77080 DXA BONE DENSITY AXIAL: CPT

## 2024-09-25 ENCOUNTER — TELEPHONE (OUTPATIENT)
Dept: INTERNAL MEDICINE | Facility: CLINIC | Age: 79
End: 2024-09-25

## 2024-09-25 NOTE — TELEPHONE ENCOUNTER
Hub staff attempted to follow warm transfer process and was unsuccessful     Caller: Marcia Ulloa S    Relationship to patient: Self    Best call back number: [phone] (Home)     Patient is needing:  PATIENT UPSET SHE STATES THAT SHE HAD A TEST DONE THAT WAS NOT SATISFACTORY AND SHE DOESN'T KNOW WHAT TO DO NOW.

## 2024-10-08 DIAGNOSIS — R79.89 ELEVATED SERUM CREATININE: Primary | ICD-10-CM

## 2024-10-09 LAB
ALBUMIN SERPL-MCNC: 4.4 G/DL (ref 3.5–5.2)
ALBUMIN/GLOB SERPL: 1.6 G/DL
ALP SERPL-CCNC: 92 U/L (ref 39–117)
ALT SERPL-CCNC: 15 U/L (ref 1–33)
AST SERPL-CCNC: 21 U/L (ref 1–32)
BILIRUB SERPL-MCNC: 0.5 MG/DL (ref 0–1.2)
BUN SERPL-MCNC: 16 MG/DL (ref 8–23)
BUN/CREAT SERPL: 13.7 (ref 7–25)
CALCIUM SERPL-MCNC: 10 MG/DL (ref 8.6–10.5)
CHLORIDE SERPL-SCNC: 105 MMOL/L (ref 98–107)
CO2 SERPL-SCNC: 27.7 MMOL/L (ref 22–29)
CREAT SERPL-MCNC: 1.17 MG/DL (ref 0.57–1)
EGFRCR SERPLBLD CKD-EPI 2021: 47.9 ML/MIN/1.73
GLOBULIN SER CALC-MCNC: 2.8 GM/DL
GLUCOSE SERPL-MCNC: 89 MG/DL (ref 65–99)
POTASSIUM SERPL-SCNC: 4.7 MMOL/L (ref 3.5–5.2)
PROT SERPL-MCNC: 7.2 G/DL (ref 6–8.5)
SODIUM SERPL-SCNC: 143 MMOL/L (ref 136–145)

## 2024-10-12 RX ORDER — DAPAGLIFLOZIN 10 MG/1
10 TABLET, FILM COATED ORAL DAILY
Qty: 30 TABLET | Refills: 11 | Status: SHIPPED | OUTPATIENT
Start: 2024-10-12

## 2024-10-12 NOTE — PROGRESS NOTES
Please inform the patient of the following abnormal results. Serum creatnine is still elevated and has some ckd. Will like to start her on farxiga 10mg daily.

## 2025-05-05 ENCOUNTER — TELEPHONE (OUTPATIENT)
Dept: INTERNAL MEDICINE | Facility: CLINIC | Age: 80
End: 2025-05-05

## 2025-05-05 NOTE — TELEPHONE ENCOUNTER
Caller: Rush Ulloa    Relationship: Emergency Contact    Best call back number: [phone]     What was the call regarding: PATIENT'S  IS ASKING IF PATIENT CAN COME IN FOR LABS THIS MORNING, IF THE ORDERS CAN BE ENTERED. PLEASE ADVISE.

## 2025-05-07 ENCOUNTER — OFFICE VISIT (OUTPATIENT)
Dept: INTERNAL MEDICINE | Facility: CLINIC | Age: 80
End: 2025-05-07
Payer: MEDICARE

## 2025-05-07 VITALS
SYSTOLIC BLOOD PRESSURE: 118 MMHG | TEMPERATURE: 98.4 F | OXYGEN SATURATION: 99 % | WEIGHT: 153 LBS | DIASTOLIC BLOOD PRESSURE: 76 MMHG | HEART RATE: 60 BPM | HEIGHT: 65 IN | BODY MASS INDEX: 25.49 KG/M2 | RESPIRATION RATE: 14 BRPM

## 2025-05-07 DIAGNOSIS — Z12.11 ENCOUNTER FOR SCREENING COLONOSCOPY: ICD-10-CM

## 2025-05-07 DIAGNOSIS — I10 ESSENTIAL HYPERTENSION: ICD-10-CM

## 2025-05-07 DIAGNOSIS — E78.49 OTHER HYPERLIPIDEMIA: ICD-10-CM

## 2025-05-07 DIAGNOSIS — N81.10 BLADDER PROLAPSE, FEMALE, ACQUIRED: Primary | ICD-10-CM

## 2025-05-07 RX ORDER — ROSUVASTATIN CALCIUM 5 MG/1
5 TABLET, COATED ORAL DAILY
Qty: 90 TABLET | Refills: 3 | Status: SHIPPED | OUTPATIENT
Start: 2025-05-07

## 2025-05-07 RX ORDER — BENAZEPRIL HYDROCHLORIDE 5 MG/1
5 TABLET ORAL DAILY
Qty: 90 TABLET | Refills: 3 | Status: SHIPPED | OUTPATIENT
Start: 2025-05-07

## 2025-05-07 RX ORDER — AMLODIPINE BESYLATE 5 MG/1
2.5 TABLET ORAL DAILY
Qty: 90 TABLET | Refills: 2 | Status: SHIPPED | OUTPATIENT
Start: 2025-05-07

## 2025-05-07 RX ORDER — DAPAGLIFLOZIN 10 MG/1
10 TABLET, FILM COATED ORAL DAILY
Qty: 30 TABLET | Refills: 11 | Status: SHIPPED | OUTPATIENT
Start: 2025-05-07

## 2025-05-08 LAB
ALBUMIN SERPL-MCNC: 4.3 G/DL (ref 3.5–5.2)
ALBUMIN/GLOB SERPL: 1.7 G/DL
ALP SERPL-CCNC: 103 U/L (ref 39–117)
ALT SERPL-CCNC: 15 U/L (ref 1–33)
AST SERPL-CCNC: 22 U/L (ref 1–32)
BASOPHILS # BLD AUTO: 0.09 10*3/MM3 (ref 0–0.2)
BASOPHILS NFR BLD AUTO: 0.9 % (ref 0–1.5)
BILIRUB SERPL-MCNC: 0.5 MG/DL (ref 0–1.2)
BUN SERPL-MCNC: 19 MG/DL (ref 8–23)
BUN/CREAT SERPL: 17.4 (ref 7–25)
CALCIUM SERPL-MCNC: 9.8 MG/DL (ref 8.6–10.5)
CHLORIDE SERPL-SCNC: 105 MMOL/L (ref 98–107)
CHOLEST SERPL-MCNC: 148 MG/DL (ref 0–200)
CO2 SERPL-SCNC: 25.1 MMOL/L (ref 22–29)
CREAT SERPL-MCNC: 1.09 MG/DL (ref 0.57–1)
EGFRCR SERPLBLD CKD-EPI 2021: 51.8 ML/MIN/1.73
EOSINOPHIL # BLD AUTO: 0.43 10*3/MM3 (ref 0–0.4)
EOSINOPHIL NFR BLD AUTO: 4.5 % (ref 0.3–6.2)
ERYTHROCYTE [DISTWIDTH] IN BLOOD BY AUTOMATED COUNT: 13.2 % (ref 12.3–15.4)
GLOBULIN SER CALC-MCNC: 2.5 GM/DL
GLUCOSE SERPL-MCNC: 83 MG/DL (ref 65–99)
HCT VFR BLD AUTO: 39.9 % (ref 34–46.6)
HDLC SERPL-MCNC: 58 MG/DL (ref 40–60)
HGB BLD-MCNC: 13.5 G/DL (ref 12–15.9)
IMM GRANULOCYTES # BLD AUTO: 0.02 10*3/MM3 (ref 0–0.05)
IMM GRANULOCYTES NFR BLD AUTO: 0.2 % (ref 0–0.5)
LDLC SERPL CALC-MCNC: 71 MG/DL (ref 0–100)
LDLC/HDLC SERPL: 1.18 {RATIO}
LYMPHOCYTES # BLD AUTO: 1.43 10*3/MM3 (ref 0.7–3.1)
LYMPHOCYTES NFR BLD AUTO: 14.9 % (ref 19.6–45.3)
MCH RBC QN AUTO: 31.9 PG (ref 26.6–33)
MCHC RBC AUTO-ENTMCNC: 33.8 G/DL (ref 31.5–35.7)
MCV RBC AUTO: 94.3 FL (ref 79–97)
MONOCYTES # BLD AUTO: 0.92 10*3/MM3 (ref 0.1–0.9)
MONOCYTES NFR BLD AUTO: 9.6 % (ref 5–12)
NEUTROPHILS # BLD AUTO: 6.73 10*3/MM3 (ref 1.7–7)
NEUTROPHILS NFR BLD AUTO: 69.9 % (ref 42.7–76)
NRBC BLD AUTO-RTO: 0 /100 WBC (ref 0–0.2)
PLATELET # BLD AUTO: 254 10*3/MM3 (ref 140–450)
POTASSIUM SERPL-SCNC: 4.6 MMOL/L (ref 3.5–5.2)
PROT SERPL-MCNC: 6.8 G/DL (ref 6–8.5)
RBC # BLD AUTO: 4.23 10*6/MM3 (ref 3.77–5.28)
SODIUM SERPL-SCNC: 140 MMOL/L (ref 136–145)
TRIGL SERPL-MCNC: 107 MG/DL (ref 0–150)
VLDLC SERPL CALC-MCNC: 19 MG/DL (ref 5–40)
WBC # BLD AUTO: 9.62 10*3/MM3 (ref 3.4–10.8)

## 2025-05-10 NOTE — PROGRESS NOTES
"Chief Complaint  Hypertension and Hyperlipidemia    Subjective          Marcia Ulloa presents to [facility]  History of Present Illness  The patient came in today because she's having trouble going to the bathroom, which she thinks is due to her bladder falling again. She hasn't seen anyone for this issue yet and prefers not to consult with Dr. Senia Frederick. She agreed to get a referral to Dr. Youssef for further evaluation.    She is also dealing with memory problems, especially with remembering people's names, which has been a long-standing issue but seems to be getting worse. She is taking Aricept as prescribed by her neurologist but hasn't noticed any improvement. She is scheduled for further testing at [facility] next Monday.    The patient has a history of high cholesterol and is taking Crestor 5 mg daily. She is also on Farxiga 10 mg daily for her kidneys, and benazepril 5 mg daily along with amlodipine 2.5 mg daily for her blood pressure.    Objective   Vital Signs:   /76 (BP Location: Left arm, Patient Position: Sitting)   Pulse 60   Temp 98.4 °F (36.9 °C) (Oral)   Resp 14   Ht 165.1 cm (65\")   Wt 69.4 kg (153 lb)   SpO2 99%   BMI 25.46 kg/m²     Physical Exam  Vitals and nursing note reviewed.   Constitutional:       Appearance: She is well-developed.   HENT:      Head: Normocephalic and atraumatic.   Musculoskeletal:      Cervical back: Normal range of motion and neck supple.   Neurological:      Mental Status: She is alert and oriented to person, place, and time.   Psychiatric:         Behavior: Behavior normal.         Physical Exam       Result Review :                 Assessment and Plan    Diagnoses and all orders for this visit:    1. Bladder prolapse, female, acquired (Primary)  -     Ambulatory Referral to Gynecology    2. Other hyperlipidemia  -     rosuvastatin (CRESTOR) 5 MG tablet; Take 1 tablet by mouth Daily.  Dispense: 90 tablet; Refill: 3  -     " Comprehensive Metabolic Panel  -     Lipid Panel With LDL / HDL Ratio    3. Essential hypertension  -     benazepril (LOTENSIN) 5 MG tablet; Take 1 tablet by mouth Daily.  Dispense: 90 tablet; Refill: 3  -     amLODIPine (NORVASC) 5 MG tablet; Take 0.5 tablets by mouth Daily.  Dispense: 90 tablet; Refill: 2  -     Comprehensive Metabolic Panel  -     CBC & Differential    4. Encounter for screening colonoscopy  -     Ambulatory Referral For Screening Colonoscopy    Other orders  -     dapagliflozin Propanediol (Farxiga) 10 MG tablet; Take 10 mg by mouth Daily.  Dispense: 30 tablet; Refill: 11      Assessment & Plan  1. Bladder prolapse.  - Reports difficulty with urination due to bladder prolapse.  - Referral to Dr. Youssef at Urogynecology has been initiated for further evaluation and management.  - Provided with Dr. Youssef's contact information to facilitate communication.  - Awaiting appointment scheduling for further assessment.    2. Memory issues.  - Currently on Aricept for memory management but reports no significant improvement.  - Continues to have trouble remembering names.  - Scheduled for memory testing next Monday at [facility].  - Blood work will be ordered today to assess overall health status.    3. Health maintenance.  - Due for a colonoscopy.  - Order for the procedure has been placed.  - Awaiting scheduling of the colonoscopy.  - No additional concerns or questions raised during the visit.    Follow Up   No follow-ups on file.  Patient was given instructions and counseling regarding her condition or for health maintenance advice. Please see specific information pulled into the AVS if appropriate.           Patient or patient representative verbalized consent for the use of Ambient Listening during the visit with  Bk Mendez MD for chart documentation. 5/10/2025  11:20 EDT

## 2025-05-20 ENCOUNTER — PREP FOR SURGERY (OUTPATIENT)
Dept: OTHER | Facility: HOSPITAL | Age: 80
End: 2025-05-20
Payer: MEDICARE

## 2025-05-20 ENCOUNTER — TELEPHONE (OUTPATIENT)
Dept: GASTROENTEROLOGY | Facility: CLINIC | Age: 80
End: 2025-05-20
Payer: MEDICARE

## 2025-05-20 DIAGNOSIS — Z12.11 ENCOUNTER FOR SCREENING FOR MALIGNANT NEOPLASM OF COLON: Primary | ICD-10-CM

## 2025-05-20 NOTE — TELEPHONE ENCOUNTER
LEONARD MAJOR  IN SCHEDULING PT SCHEDULED 08/26/2025 ARRIVING AT 0800AM PT ON [facility] VERBALIZES UNDERSTANDING THREE DAY HOLD ALSO HIGHLIGHTED ON CLS PREP INFORMATION SHEET MAILED TO ADDRESS ON FILE VERIFIED BY PT OK FOR THE HUB TO RELAY

## 2025-05-20 NOTE — TELEPHONE ENCOUNTER
Last colonoscopy 8/12/14 - care everywhere    No personal hx polyps  Unknown family hx polyps or cx    Asa or blood thinners:  None    List medications:  Benazepril  Amlodipine  Farxiga  Rosuvastatin  Melatonin  Donepezil    OA form scanned in media

## 2025-07-08 ENCOUNTER — EXTERNAL PBMM DATA (OUTPATIENT)
Dept: PHARMACY | Facility: OTHER | Age: 80
End: 2025-07-08
Payer: MEDICARE

## 2025-07-14 ENCOUNTER — TRANSCRIBE ORDERS (OUTPATIENT)
Dept: ULTRASOUND IMAGING | Facility: HOSPITAL | Age: 80
End: 2025-07-14
Payer: MEDICARE

## 2025-07-14 DIAGNOSIS — Z12.31 SCREENING MAMMOGRAM, ENCOUNTER FOR: Primary | ICD-10-CM

## 2025-07-14 RX ORDER — DAPAGLIFLOZIN 10 MG/1
1 TABLET, FILM COATED ORAL DAILY
Qty: 30 TABLET | Refills: 0 | Status: SHIPPED | OUTPATIENT
Start: 2025-07-14

## 2025-07-14 NOTE — TELEPHONE ENCOUNTER
Rx Refill Note  Requested Prescriptions     Pending Prescriptions Disp Refills    Farxiga 10 MG tablet [Pharmacy Med Name: Farxiga 10 MG Oral Tablet] 30 tablet 0     Sig: Take 1 tablet by mouth once daily      Last office visit with prescribing clinician: 5/7/2025   Last telemedicine visit with prescribing clinician: Visit date not found   Next office visit with prescribing clinician: 9/10/2025       Nilsa Farnsworth  07/14/25, 11:04 EDT

## 2025-07-28 ENCOUNTER — TELEPHONE (OUTPATIENT)
Dept: GASTROENTEROLOGY | Facility: CLINIC | Age: 80
End: 2025-07-28

## 2025-08-28 ENCOUNTER — HOSPITAL ENCOUNTER (OUTPATIENT)
Dept: MAMMOGRAPHY | Facility: HOSPITAL | Age: 80
Discharge: HOME | End: 2025-08-28
Admitting: FAMILY MEDICINE
Payer: MEDICARE

## 2025-08-28 DIAGNOSIS — Z12.31 SCREENING MAMMOGRAM, ENCOUNTER FOR: ICD-10-CM

## 2025-08-28 PROCEDURE — 77063 BREAST TOMOSYNTHESIS BI: CPT

## 2025-08-28 PROCEDURE — 77067 SCR MAMMO BI INCL CAD: CPT

## (undated) DEVICE — ENDOPATH XCEL BLADELESS TROCARS WITH STABILITY SLEEVES: Brand: ENDOPATH XCEL

## (undated) DEVICE — SYR SLP TP 10ML DISP

## (undated) DEVICE — TROCAR: Brand: KII OPTICAL ACCESS SYSTEM

## (undated) DEVICE — COVER,MAYO STAND,STERILE: Brand: MEDLINE

## (undated) DEVICE — VAGINAL PACKING: Brand: DEROYAL

## (undated) DEVICE — UNDYED BRAIDED (POLYGLACTIN 910), SYNTHETIC ABSORBABLE SUTURE: Brand: COATED VICRYL

## (undated) DEVICE — PK UNIV COMPL 40

## (undated) DEVICE — ENDOPATH XCEL UNIVERSAL TROCAR STABLILITY SLEEVES: Brand: ENDOPATH XCEL

## (undated) DEVICE — DRAPE,REIN 53X77,STERILE: Brand: MEDLINE

## (undated) DEVICE — SOL NACL 0.9PCT 1000ML

## (undated) DEVICE — Device

## (undated) DEVICE — ADHS SKIN DERMABOND TOP ADVANCED

## (undated) DEVICE — RUBBERBAND LF STRL PK/2

## (undated) DEVICE — ST IRR CYSTO W/SPK 77IN LF

## (undated) DEVICE — SYR LL TP 10ML STRL

## (undated) DEVICE — SUT PROLN 1 CT 30IN 8435H

## (undated) DEVICE — SINGLE BASIN: Brand: CARDINAL HEALTH

## (undated) DEVICE — ANTIBACTERIAL UNDYED BRAIDED (POLYGLACTIN 910), SYNTHETIC ABSORBABLE SUTURE: Brand: COATED VICRYL

## (undated) DEVICE — APPL HEMOS FOR DELIVERY FLOSEAL

## (undated) DEVICE — SHEARS WITH ROTICULATOR TECHNOLOGY: Brand: ENDO MINI-SHEARS

## (undated) DEVICE — SYR LUERLOK 50ML

## (undated) DEVICE — MAT FLR ABS LIQUILOC 28X56IN PNK

## (undated) DEVICE — SYR LL 3CC

## (undated) DEVICE — GLV SURG BIOGEL LTX PF 6 1/2

## (undated) DEVICE — PUNCH BIOP 2MM

## (undated) DEVICE — ENDOPATH XCEL WITH OPTIVIEW TECHNOLOGY BLADELESS TROCARS WITH STABILITY SLEEVES: Brand: ENDOPATH XCEL OPTIVIEW

## (undated) DEVICE — SMOKE EVACUATION TUBING WITH 7/8 IN TO 1/4 IN REDUCER: Brand: BUFFALO FILTER

## (undated) DEVICE — DISPOSABLE MONOPOLAR ENDOSCOPIC CORD 10 FT. (3M): Brand: KIRWAN

## (undated) DEVICE — DEV SUT GRSPR CLOSUR 15CM 14G

## (undated) DEVICE — INTENDED FOR TISSUE SEPARATION, AND OTHER PROCEDURES THAT REQUIRE A SHARP SURGICAL BLADE TO PUNCTURE OR CUT.: Brand: BARD-PARKER ® CARBON RIB-BACK BLADES

## (undated) DEVICE — TOTAL TRAY, 16FR 10ML SIL FOLEY, URN: Brand: MEDLINE

## (undated) DEVICE — NDL SPINE 18G 31/2IN PNK

## (undated) DEVICE — HARMONIC ACE +7 LAPAROSCOPIC SHEARS ADVANCED HEMOSTASIS 5MM DIAMETER 36CM SHAFT LENGTH  FOR USE WITH GRAY HAND PIECE ONLY: Brand: HARMONIC ACE

## (undated) DEVICE — TUBING, SUCTION, 1/4" X 20', STRAIGHT: Brand: MEDLINE INDUSTRIES, INC.

## (undated) DEVICE — 3M™ STERI-DRAPE™ INSTRUMENT POUCH 1018L: Brand: STERI-DRAPE™

## (undated) DEVICE — COUNT NDL MAG FM/BLCK 40COUNT

## (undated) DEVICE — SUT VIC 0 CT 36IN J958H

## (undated) DEVICE — DRSNG WND BORDR/ADHS NONADHR/GZ LF 2X2IN STRL

## (undated) DEVICE — SYRINGE, LUER LOCK, 60ML: Brand: MEDLINE

## (undated) DEVICE — IRRIGATOR BULB ASEPTO 60CC STRL

## (undated) DEVICE — NDL HYPO PRECISIONGLIDE REG 25G 1 1/2

## (undated) DEVICE — LEGGINGS, PAIR, 31X48, STERILE: Brand: MEDLINE

## (undated) DEVICE — APPL CHLORAPREP W/TINT 26ML ORNG

## (undated) DEVICE — PCH INST SURG INVISISHIELD 2PCKT

## (undated) DEVICE — ENDOCUT SCISSOR TIP, DISPOSABLE: Brand: RENEW

## (undated) DEVICE — LEGGINGS, PAIR, CLEAR, STERILE: Brand: MEDLINE

## (undated) DEVICE — SUT PROLN 1 CT1 30IN 8425H

## (undated) DEVICE — SUT VIC 0 TIES 18IN J912G

## (undated) DEVICE — SUT VIC 2/0 CT2 27IN J269H

## (undated) DEVICE — 3M™ STERI-DRAPE™ INSTRUMENT POUCH 1018: Brand: STERI-DRAPE™

## (undated) DEVICE — LAPAROVUE VISIBILITY SYSTEM LAPAROSCOPIC SOLUTIONS: Brand: LAPAROVUE

## (undated) DEVICE — MAT FLR ABSORBENT LG 4FT 10 2.5FT

## (undated) DEVICE — 2, DISPOSABLE SUCTION/IRRIGATOR WITH DISPOSABLE TIP: Brand: STRYKEFLOW

## (undated) DEVICE — DEV COND GAS LAP INSUFLOW W/LUER CONN

## (undated) DEVICE — LAPAROSCOPIC SMOKE ELIMINATION DEVICE: Brand: PNEUVIEW XE

## (undated) DEVICE — SKIN PREP TRAY W/CHG: Brand: MEDLINE INDUSTRIES, INC.

## (undated) DEVICE — RETR RNG GEN2 31.8X18.3CM

## (undated) DEVICE — ENDOPATH PNEUMONEEDLE INSUFFLATION NEEDLES WITH LUER LOCK CONNECTORS 120MM: Brand: ENDOPATH

## (undated) DEVICE — CATHETER,FOLEY,100%SILICONE,16FR,10ML,LF: Brand: MEDLINE

## (undated) DEVICE — SUT PA DBL ARM TC22 T1/2CR 0 36IN

## (undated) DEVICE — STRIP,CLOSURE,WOUND,MEDI-STRIP,1/2X4: Brand: MEDLINE

## (undated) DEVICE — SPNG LAP 18X18IN LF STRL PK/5

## (undated) DEVICE — DRP Z/FRICTION 10X16IN

## (undated) DEVICE — CARDIOVASCULAR SPLIT DRAPE II, OPTIMA: Brand: CONVERTORS

## (undated) DEVICE — MANIP UTER ADVINCULA DELINEATOR 3.0CM

## (undated) DEVICE — PENCL E/S HNDSWCH ROCKR CB

## (undated) DEVICE — CONTN STRL 32OZ

## (undated) DEVICE — 1842 FOAM BLOCK NEEDLE COUNTER: Brand: DEVON

## (undated) DEVICE — LOU GYN LAPAROSCOPY: Brand: MEDLINE INDUSTRIES, INC.

## (undated) DEVICE — APPL CHLORAPREP HI/LITE 26ML ORNG

## (undated) DEVICE — KT ANTI FOG W/FLD AND SPNG

## (undated) DEVICE — GOWN,SIRUS,NON REINFRCD,LARGE,SET IN SL: Brand: MEDLINE

## (undated) DEVICE — SYR CONTRL PRESS/LO FIX/M/LL W/THMB/RNG 10ML

## (undated) DEVICE — IRR EAR 2OZ

## (undated) DEVICE — APPL HEMO SURG ARISTA/AH/FLEXITIP XL 38CM

## (undated) DEVICE — SUT VIC 0 CT1 36IN J946H

## (undated) DEVICE — RETR STAY HK ELAS SHRP 5MM PK/8

## (undated) DEVICE — 1010 S-DRAPE TOWEL DRAPE 10/BX: Brand: STERI-DRAPE™

## (undated) DEVICE — DRAPE,UNDERBUTTOCKS,PCH,STERILE: Brand: MEDLINE

## (undated) DEVICE — PREMIUM WET SKIN PREP TRAY: Brand: MEDLINE INDUSTRIES, INC.

## (undated) DEVICE — SYR CONTRL LUERLOK 10CC